# Patient Record
Sex: FEMALE | Race: BLACK OR AFRICAN AMERICAN | Employment: FULL TIME | URBAN - METROPOLITAN AREA
[De-identification: names, ages, dates, MRNs, and addresses within clinical notes are randomized per-mention and may not be internally consistent; named-entity substitution may affect disease eponyms.]

---

## 2020-02-24 ENCOUNTER — HOSPITAL ENCOUNTER (EMERGENCY)
Age: 35
Discharge: HOME | End: 2020-02-24
Attending: EMERGENCY MEDICINE
Payer: COMMERCIAL

## 2020-02-24 ENCOUNTER — APPOINTMENT (OUTPATIENT)
Dept: ULTRASOUND IMAGING | Age: 35
End: 2020-02-24
Attending: EMERGENCY MEDICINE
Payer: COMMERCIAL

## 2020-02-24 VITALS
BODY MASS INDEX: 25.1 KG/M2 | OXYGEN SATURATION: 98 % | WEIGHT: 147 LBS | HEART RATE: 95 BPM | RESPIRATION RATE: 16 BRPM | DIASTOLIC BLOOD PRESSURE: 68 MMHG | SYSTOLIC BLOOD PRESSURE: 120 MMHG | TEMPERATURE: 98 F | HEIGHT: 64 IN

## 2020-02-24 DIAGNOSIS — O03.9 SPONTANEOUS MISCARRIAGE: Primary | ICD-10-CM

## 2020-02-24 LAB
ANION GAP SERPL CALC-SCNC: 11 MMOL/L (ref 7–16)
BUN SERPL-MCNC: 6 MG/DL (ref 6–23)
CALCIUM SERPL-MCNC: 8.8 MG/DL (ref 8.3–10.4)
CHLORIDE SERPL-SCNC: 103 MMOL/L (ref 98–107)
CO2 SERPL-SCNC: 19 MMOL/L (ref 21–32)
CREAT SERPL-MCNC: 0.5 MG/DL (ref 0.6–1)
ERYTHROCYTE [DISTWIDTH] IN BLOOD BY AUTOMATED COUNT: 17 % (ref 11.9–14.6)
GLUCOSE SERPL-MCNC: 132 MG/DL (ref 65–100)
HCG SERPL-ACNC: ABNORMAL MIU/ML (ref 0–6)
HCT VFR BLD AUTO: 35.9 % (ref 35.8–46.3)
HGB BLD-MCNC: 11.6 G/DL (ref 11.7–15.4)
MCH RBC QN AUTO: 27 PG (ref 26.1–32.9)
MCHC RBC AUTO-ENTMCNC: 32.3 G/DL (ref 31.4–35)
MCV RBC AUTO: 83.7 FL (ref 79.6–97.8)
NRBC # BLD: 0 K/UL (ref 0–0.2)
PLATELET # BLD AUTO: 314 K/UL (ref 150–450)
PMV BLD AUTO: 9.6 FL (ref 9.4–12.3)
POTASSIUM SERPL-SCNC: 3.4 MMOL/L (ref 3.5–5.1)
RBC # BLD AUTO: 4.29 M/UL (ref 4.05–5.2)
SODIUM SERPL-SCNC: 133 MMOL/L (ref 136–145)
WBC # BLD AUTO: 17.2 K/UL (ref 4.3–11.1)

## 2020-02-24 PROCEDURE — 88305 TISSUE EXAM BY PATHOLOGIST: CPT

## 2020-02-24 PROCEDURE — 84702 CHORIONIC GONADOTROPIN TEST: CPT

## 2020-02-24 PROCEDURE — 96372 THER/PROPH/DIAG INJ SC/IM: CPT

## 2020-02-24 PROCEDURE — 80048 BASIC METABOLIC PNL TOTAL CA: CPT

## 2020-02-24 PROCEDURE — 74011250636 HC RX REV CODE- 250/636: Performed by: EMERGENCY MEDICINE

## 2020-02-24 PROCEDURE — 85027 COMPLETE CBC AUTOMATED: CPT

## 2020-02-24 PROCEDURE — 99285 EMERGENCY DEPT VISIT HI MDM: CPT

## 2020-02-24 PROCEDURE — 76856 US EXAM PELVIC COMPLETE: CPT

## 2020-02-24 RX ORDER — IBUPROFEN 600 MG/1
600 TABLET ORAL
Qty: 20 TAB | Refills: 0 | Status: SHIPPED | OUTPATIENT
Start: 2020-02-24 | End: 2020-08-18

## 2020-02-24 RX ORDER — SODIUM CHLORIDE 9 MG/ML
125 INJECTION, SOLUTION INTRAVENOUS CONTINUOUS
Status: DISCONTINUED | OUTPATIENT
Start: 2020-02-24 | End: 2020-02-24 | Stop reason: HOSPADM

## 2020-02-24 RX ADMIN — RHO(D) IMMUNE GLOBULIN (HUMAN) 0.3 MG: 1500 SOLUTION INTRAMUSCULAR at 05:35

## 2020-02-24 RX ADMIN — SODIUM CHLORIDE 125 ML/HR: 900 INJECTION, SOLUTION INTRAVENOUS at 05:35

## 2020-02-24 NOTE — ED TRIAGE NOTES
EMS was called out to pt house for miscarriage. The pt was 16 weeks gestation with baby number 1. Pt went to use the restroom and passed the baby.  VS were stable via ems 120/76 80     20G was placed in right hand via EMS

## 2020-02-24 NOTE — ED NOTES
Specimen labeled with temporary clinical collect label and walked to lab by this RN. Downtime form also sent with specimen.

## 2020-02-24 NOTE — DISCHARGE INSTRUCTIONS
Patient Education        Miscarriage: Care Instructions  Your Care Instructions    The loss of a pregnancy can be very hard. You may wonder why it happened or blame yourself. Miscarriages are common and are not caused by exercise, stress, or sex. Most happen because the fertilized egg in the uterus does not develop normally. There is no treatment that can stop a miscarriage. As long as you do not have heavy blood loss, fever, weakness, or other signs of infection, you can let a miscarriage follow its own course. This can take several days. Your body will recover over the next several weeks. Having a miscarriage does not mean you cannot have a normal pregnancy in the future. The doctor has checked you carefully, but problems can develop later. If you notice any problems or new symptoms, get medical treatment right away. Follow-up care is a key part of your treatment and safety. Be sure to make and go to all appointments, and call your doctor if you are having problems. It's also a good idea to know your test results and keep a list of the medicines you take. How can you care for yourself at home? · You will probably have some vaginal bleeding for 1 to 2 weeks. It may be similar to or slightly heavier than a normal period. The bleeding should get lighter after a week. Use pads instead of tampons. You may use tampons during your next period, which should start in 3 to 6 weeks. · Take an over-the-counter pain medicine, such as acetaminophen (Tylenol), ibuprofen (Advil, Motrin), or naproxen (Aleve) for cramps. Read and follow all instructions on the label. You may have cramps for several days after the miscarriage. · Do not take two or more pain medicines at the same time unless the doctor told you to. Many pain medicines have acetaminophen, which is Tylenol. Too much acetaminophen (Tylenol) can be harmful. · Use a clear container to save any tissue that you pass.  Take it to your doctor's office as soon as you can.  · Do not have sex until the bleeding stops. · You may return to your normal activities if you feel well enough to do so. But you should avoid heavy exercise until the bleeding stops. · If you would like to try to get pregnant again, it is usually safe whenever you feel ready. Talk with your doctor about any future pregnancy plans. · If you do not want to get pregnant, ask your doctor about birth control. You can get pregnant again before your next period starts if you are not using birth control. · You may be low in iron because of blood loss. Eat a balanced diet that is high in iron and vitamin C. Foods rich in iron include red meat, shellfish, eggs, beans, and leafy green vegetables. Foods high in vitamin C include citrus fruits, tomatoes, and broccoli. Talk to your doctor about whether you need to take iron pills or a multivitamin. · The loss of a pregnancy can be very hard. You may wonder why it happened and blame yourself. Talking to family members, friends, a counselor, or your doctor may help you cope with your loss. When should you call for help? Call 911 anytime you think you may need emergency care. For example, call if:    · You passed out (lost consciousness).    Call your doctor now or seek immediate medical care if:    · You have severe vaginal bleeding.     · You are dizzy or lightheaded, or you feel like you may faint.     · You have new or worse pain in your belly or pelvis.     · You have a fever.     · You have vaginal discharge that smells bad.    Watch closely for changes in your health, and be sure to contact your doctor if:    · You do not get better as expected. Where can you learn more? Go to http://daniel-harry.info/. Enter E802 in the search box to learn more about \"Miscarriage: Care Instructions. \"  Current as of: May 29, 2019  Content Version: 12.2  © 0989-4161 [employer].  Care instructions adapted under license by [employer] (which disclaims liability or warranty for this information). If you have questions about a medical condition or this instruction, always ask your healthcare professional. [address] any warranty or liability for your use of this information.

## 2020-02-24 NOTE — ED PROVIDER NOTES
51-year-old female presents with concerns about a spontaneous miscarriage. She said last night before she went to bed she had some very light spotting but then this morning became heavier and as she was trying to urinate the embryo delivered. She was approximately 16 weeks in G1. She denies any medical problems and does not take any medications. No other associated symptoms. Elements of this note were created using speech recognition software. As such, errors of speech recognition may be present. No past medical history on file. No past surgical history on file. No family history on file.     Social History     Socioeconomic History    Marital status: SINGLE     Spouse name: Not on file    Number of children: Not on file    Years of education: Not on file    Highest education level: Not on file   Occupational History    Not on file   Social Needs    Financial resource strain: Not on file    Food insecurity:     Worry: Not on file     Inability: Not on file    Transportation needs:     Medical: Not on file     Non-medical: Not on file   Tobacco Use    Smoking status: Not on file   Substance and Sexual Activity    Alcohol use: Not on file    Drug use: Not on file    Sexual activity: Not on file   Lifestyle    Physical activity:     Days per week: Not on file     Minutes per session: Not on file    Stress: Not on file   Relationships    Social connections:     Talks on phone: Not on file     Gets together: Not on file     Attends [employer] service: Not on file     Active member of club or organization: Not on file     Attends meetings of clubs or organizations: Not on file     Relationship status: Not on file    Intimate partner violence:     Fear of current or ex partner: Not on file     Emotionally abused: Not on file     Physically abused: Not on file     Forced sexual activity: Not on file   Other Topics Concern    Not on file   Social History Narrative    Not on file ALLERGIES: Patient has no known allergies. Review of Systems   Constitutional: Negative for chills, diaphoresis and fever. HENT: Negative for congestion, rhinorrhea and sore throat. Eyes: Negative for redness and visual disturbance. Respiratory: Negative for cough, chest tightness, shortness of breath and wheezing. Cardiovascular: Negative for chest pain and palpitations. Gastrointestinal: Negative for abdominal pain, blood in stool, diarrhea, nausea and vomiting. Endocrine: Negative for polydipsia and polyuria. Genitourinary: Positive for vaginal bleeding. Negative for dysuria and hematuria. Musculoskeletal: Negative for arthralgias, myalgias and neck stiffness. Skin: Negative for rash. Allergic/Immunologic: Negative for environmental allergies and food allergies. Neurological: Negative for dizziness, weakness and headaches. Hematological: Negative for adenopathy. Does not bruise/bleed easily. Psychiatric/Behavioral: Negative for confusion and sleep disturbance. The patient is not nervous/anxious. Vitals:    02/24/20 0520   BP: 117/71   Pulse: (!) 111   Resp: 18   Temp: 98 °F (36.7 °C)   SpO2: 96%   Weight: 66.7 kg (147 lb)   Height: 5' 4\" (1.626 m)            Physical Exam  Vitals signs and nursing note reviewed. Constitutional:       Appearance: Normal appearance. Cardiovascular:      Rate and Rhythm: Normal rate and regular rhythm. Pulses: Normal pulses. Heart sounds: Normal heart sounds. Pulmonary:      Effort: Pulmonary effort is normal.      Breath sounds: Normal breath sounds. Genitourinary:     Comments: Obvious embryo was already out. I clamped and cut the umbilical cord. Neurological:      Mental Status: She is alert. MDM  Number of Diagnoses or Management Options  Diagnosis management comments: Will discuss the case with on-call for [facility].     Patient is a negative so I will give her RhoGam    I spoke with Dr. Amaya Moura who is going to review the chart. 5:37 AM  The placenta appears to have delivered.          Procedures

## 2020-02-24 NOTE — ED NOTES
I have reviewed discharge instructions with the patient and boyfriend. The patient and boyfriend verbalized understanding. Patient left ED via Discharge Method: supplied with bating clothes, wash clothes and towels in room at this time, will be departing when completed with boyfriend. Opportunity for questions and clarification provided. Patient given 1 scripts. To continue your aftercare when you leave the hospital, you may receive an automated call from our care team to check in on how you are doing. This is a free service and part of our promise to provide the best care and service to meet your aftercare needs.  If you have questions, or wish to unsubscribe from this service please call [phone]. Thank you for Choosing our [facility] Emergency Department.

## 2020-02-24 NOTE — CONSULTS
History and Physical      Hurman Bottoms:   Physician:  Darlene Gonsalez. Alt, DO    This 37 y.o. female AA G1 presented to the ER with delivery of baby at home. Placenta was still in utero when ER called. Upon arrival to ER, placenta had spontaneously delivered. She was scheduled to see our office on 2/27. She has been receiving care with another group and was a transfer in. She had some ab pain several days ago. Noticed pink spotting last night and then felt pressure and went to the bathroom this am.          OB History    No obstetric history on file. G1    Past gyn hx:  Regular cycles, no infertility. Small fibroids per her report. No hx of cervical procedures. History reviewed. No pertinent past medical history. has no past surgical history on file. Current Facility-Administered Medications   Medication Dose Route Frequency    0.9% sodium chloride infusion  125 mL/hr IntraVENous CONTINUOUS     No current outpatient medications on file. No Known Allergies. reports that she has never smoked. She does not have any smokeless tobacco history on file. She reports that she does not drink alcohol or use drugs. family history is not on file. Physical Exam:    Vitals:    02/24/20 0520   BP: 117/71   Pulse: (!) 111   Resp: 18   Temp: 98 °F (36.7 °C)   SpO2: 96%   Weight: 147 lb (66.7 kg)   Height: 5' 4\" (1.626 m)       Patient without distress. Heart: Regular rate and rhythm   Lung: clear to auscultation throughout lung fields, no wheezes, no rales, no rhonchi and normal respiratory effort  Abdomen: soft, nontender. Firm fundus felt below umbilicus. Lower Extremities:  - Edema No  Fetus inspected and likely male with no defects seen. Placenta seen but seemed to be small. Findings/Diagnosis:     S/p delivery of 16 week fetus. Will order us to make sure products of conception  / placenta has passed. Per ER doctor, rh negative and rhogam was given. Called by [email or web address] tech. Lining looks free of placenta. I had seen pt and advised her to be out of work 3-5 days. Follow up 2 week in my office. Infection and bleeding precautions. rtc 2 weeks.

## 2020-02-24 NOTE — ED NOTES
Pt presents to the ED for spontaneous vaginal delivery at approx 16 weeks. Pt states that she has not had any prenatal complication but had some vaginal spotting earlier tonight and then noticed increased vaginal bleeding with spontaneous delivery. Denies pain at the present time.   Boyfriend at the bedside

## 2020-02-24 NOTE — PROGRESS NOTES
Support given to patient and boyfriend in 14 week fetal death of baby boy. \"[employer]\"  Support material given as well as Viv[facility]te support group information given along with contact information. Patient decided on [facility].  I went over information with them as to burial location, etc.  Patient and boyfriend tearful and processing appropriately this tragic event. Continued support given. Haydee Man M.Div.

## 2020-03-10 ENCOUNTER — TELEPHONE (OUTPATIENT)
Dept: CASE MANAGEMENT | Age: 35
End: 2020-03-10

## 2020-03-10 PROBLEM — Z11.3 SCREENING EXAMINATION FOR VENEREAL DISEASE: Status: ACTIVE | Noted: 2020-03-10

## 2020-03-10 NOTE — TELEPHONE ENCOUNTER
SW consult received from [facility] office due to 16 week loss last month. Phone call to patient at [phone]. Patient states that she's \"doing okay today. \"  Per patient, \"I can't bring myself to look at the papers they gave me\" (while in the Emergency Room). Emotional support provided by .  provided education on resources/support available thru [address] [place] [address]. Additionally, information was e-mailed to patient at: [email or web address]. Patient denied any additional needs at this time. Patient has this 's contact information should any needs/questions arise.     PATRICIO Hodge  [facility]   [phone]

## 2020-04-09 PROBLEM — Z11.3 SCREENING EXAMINATION FOR VENEREAL DISEASE: Status: RESOLVED | Noted: 2020-03-10 | Resolved: 2020-04-09

## 2020-07-20 PROBLEM — D25.9 UTERINE FIBROIDS AFFECTING PREGNANCY: Status: ACTIVE | Noted: 2020-07-20

## 2020-07-20 PROBLEM — Z87.51 HISTORY OF PRETERM DELIVERY: Status: ACTIVE | Noted: 2020-07-20

## 2020-07-20 PROBLEM — O26.899 RH NEGATIVE STATUS DURING PREGNANCY: Status: ACTIVE | Noted: 2020-07-20

## 2020-07-20 PROBLEM — O34.10 UTERINE FIBROIDS AFFECTING PREGNANCY: Status: ACTIVE | Noted: 2020-07-20

## 2020-07-20 PROBLEM — Z34.90 PREGNANCY: Status: ACTIVE | Noted: 2020-07-20

## 2020-07-20 PROBLEM — Z67.91 RH NEGATIVE STATUS DURING PREGNANCY: Status: ACTIVE | Noted: 2020-07-20

## 2020-08-17 PROBLEM — O09.91 HIGH-RISK PREGNANCY IN FIRST TRIMESTER: Status: ACTIVE | Noted: 2020-07-20

## 2020-08-17 PROBLEM — O09.299: Status: ACTIVE | Noted: 2020-07-20

## 2020-08-18 PROBLEM — Z36.82 NUCHAL TRANSLUCENCY OF FETUS ON PRENATAL ULTRASOUND: Status: ACTIVE | Noted: 2020-08-18

## 2020-09-08 PROBLEM — O09.92 HIGH-RISK PREGNANCY IN SECOND TRIMESTER: Status: ACTIVE | Noted: 2020-07-20

## 2020-09-08 PROBLEM — Z36.82 NUCHAL TRANSLUCENCY OF FETUS ON PRENATAL ULTRASOUND: Status: RESOLVED | Noted: 2020-08-18 | Resolved: 2020-09-08

## 2020-09-17 PROBLEM — O34.12 LEIOMYOMA OF UTERUS AFFECTING PREGNANCY IN SECOND TRIMESTER: Status: ACTIVE | Noted: 2020-07-20

## 2020-09-17 PROBLEM — O36.5990 PREGNANCY AFFECTED BY FETAL GROWTH RESTRICTION: Status: ACTIVE | Noted: 2020-09-17

## 2020-10-19 PROBLEM — O16.9 ELEVATED BLOOD PRESSURE AFFECTING PREGNANCY, ANTEPARTUM: Status: ACTIVE | Noted: 2020-10-19

## 2020-11-16 PROBLEM — O36.5120 PLACENTAL INSUFFICIENCY AFFECTING MANAGEMENT OF MOTHER IN SECOND TRIMESTER: Status: ACTIVE | Noted: 2020-11-16

## 2020-11-23 PROBLEM — O11.2: Status: ACTIVE | Noted: 2020-10-19

## 2020-12-04 PROBLEM — O11.3: Status: ACTIVE | Noted: 2020-10-19

## 2020-12-04 PROBLEM — O09.93 HIGH-RISK PREGNANCY IN THIRD TRIMESTER: Status: ACTIVE | Noted: 2020-07-20

## 2020-12-07 PROBLEM — O10.913 CHRONIC HYPERTENSION WITH EXACERBATION DURING PREGNANCY IN THIRD TRIMESTER: Status: ACTIVE | Noted: 2020-10-19

## 2020-12-07 PROBLEM — O34.13 LEIOMYOMA OF UTERUS AFFECTING PREGNANCY IN THIRD TRIMESTER: Status: ACTIVE | Noted: 2020-07-20

## 2020-12-07 PROBLEM — O10.912 CHRONIC HYPERTENSION WITH EXACERBATION DURING PREGNANCY IN SECOND TRIMESTER: Status: ACTIVE | Noted: 2020-10-19

## 2020-12-07 PROBLEM — O36.5130 PLACENTAL INSUFFICIENCY AFFECTING MANAGEMENT OF MOTHER IN THIRD TRIMESTER: Status: ACTIVE | Noted: 2020-11-16

## 2020-12-09 PROBLEM — R82.71 GBS BACTERIURIA: Status: ACTIVE | Noted: 2020-01-14

## 2020-12-24 ENCOUNTER — HOSPITAL ENCOUNTER (EMERGENCY)
Age: 35
Discharge: HOME | End: 2020-12-24
Attending: OBSTETRICS & GYNECOLOGY | Admitting: OBSTETRICS & GYNECOLOGY
Payer: COMMERCIAL

## 2020-12-24 VITALS — HEART RATE: 109 BPM | DIASTOLIC BLOOD PRESSURE: 85 MMHG | SYSTOLIC BLOOD PRESSURE: 137 MMHG

## 2020-12-24 LAB
ALBUMIN SERPL-MCNC: 2.4 G/DL (ref 3.5–5)
ALBUMIN/GLOB SERPL: 0.6 {RATIO} (ref 1.2–3.5)
ALP SERPL-CCNC: 81 U/L (ref 50–136)
ALT SERPL-CCNC: 14 U/L (ref 12–65)
ANION GAP SERPL CALC-SCNC: 10 MMOL/L (ref 7–16)
AST SERPL-CCNC: 13 U/L (ref 15–37)
BASOPHILS # BLD: 0 K/UL (ref 0–0.2)
BASOPHILS NFR BLD: 0 % (ref 0–2)
BILIRUB SERPL-MCNC: 0.2 MG/DL (ref 0.2–1.1)
BUN SERPL-MCNC: 6 MG/DL (ref 6–23)
CALCIUM SERPL-MCNC: 8.3 MG/DL (ref 8.3–10.4)
CHLORIDE SERPL-SCNC: 104 MMOL/L (ref 98–107)
CO2 SERPL-SCNC: 22 MMOL/L (ref 21–32)
CREAT SERPL-MCNC: 0.57 MG/DL (ref 0.6–1)
DIFFERENTIAL METHOD BLD: ABNORMAL
EOSINOPHIL # BLD: 0.1 K/UL (ref 0–0.8)
EOSINOPHIL NFR BLD: 2 % (ref 0.5–7.8)
ERYTHROCYTE [DISTWIDTH] IN BLOOD BY AUTOMATED COUNT: 16 % (ref 11.9–14.6)
GLOBULIN SER CALC-MCNC: 4.1 G/DL (ref 2.3–3.5)
GLUCOSE SERPL-MCNC: 177 MG/DL (ref 65–100)
HCT VFR BLD AUTO: 33.6 % (ref 35.8–46.3)
HGB BLD-MCNC: 10.8 G/DL (ref 11.7–15.4)
IMM GRANULOCYTES # BLD AUTO: 0 K/UL (ref 0–0.5)
IMM GRANULOCYTES NFR BLD AUTO: 0 % (ref 0–5)
LDH SERPL L TO P-CCNC: 152 U/L (ref 100–190)
LYMPHOCYTES # BLD: 2 K/UL (ref 0.5–4.6)
LYMPHOCYTES NFR BLD: 27 % (ref 13–44)
MCH RBC QN AUTO: 26.6 PG (ref 26.1–32.9)
MCHC RBC AUTO-ENTMCNC: 32.1 G/DL (ref 31.4–35)
MCV RBC AUTO: 82.8 FL (ref 79.6–97.8)
MONOCYTES # BLD: 0.7 K/UL (ref 0.1–1.3)
MONOCYTES NFR BLD: 9 % (ref 4–12)
NEUTS SEG # BLD: 4.6 K/UL (ref 1.7–8.2)
NEUTS SEG NFR BLD: 61 % (ref 43–78)
NRBC # BLD: 0 K/UL (ref 0–0.2)
PLATELET # BLD AUTO: 341 K/UL (ref 150–450)
PMV BLD AUTO: 10.3 FL (ref 9.4–12.3)
POTASSIUM SERPL-SCNC: 3.1 MMOL/L (ref 3.5–5.1)
PROT SERPL-MCNC: 6.5 G/DL (ref 6.3–8.2)
RBC # BLD AUTO: 4.06 M/UL (ref 4.05–5.2)
SODIUM SERPL-SCNC: 136 MMOL/L (ref 136–145)
URATE SERPL-MCNC: 3 MG/DL (ref 2.6–6)
WBC # BLD AUTO: 7.5 K/UL (ref 4.3–11.1)

## 2020-12-24 PROCEDURE — 84550 ASSAY OF BLOOD/URIC ACID: CPT

## 2020-12-24 PROCEDURE — 80053 COMPREHEN METABOLIC PANEL: CPT

## 2020-12-24 PROCEDURE — 83615 LACTATE (LD) (LDH) ENZYME: CPT

## 2020-12-24 PROCEDURE — 85025 COMPLETE CBC W/AUTO DIFF WBC: CPT

## 2020-12-24 PROCEDURE — 59025 FETAL NON-STRESS TEST: CPT

## 2020-12-24 NOTE — DISCHARGE INSTRUCTIONS
Patient Education        Preeclampsia: Care Instructions  Overview     Preeclampsia occurs when a woman's blood pressure rises during pregnancy. Often with preeclampsia, you also have swelling in your legs, hands, and face. A test may show too much protein in your urine. Preeclampsia is also called toxemia. If preeclampsia is severe and not treated, it can lead to seizures (eclampsia) and damage to your liver or kidneys. Preeclampsia can prevent your baby from getting enough food and oxygen. This can cause a low birth weight or other problems. Your doctor will watch you closely to prevent these problems. He or she also may recommend that you reduce your activity. If your preeclampsia is a danger to your health or the health of your baby, your doctor may need to deliver your baby early. While preeclampsia is a concern, most women with preeclampsia have healthy babies. After a woman gives birth, preeclampsia usually goes away on its own. But symptoms may last a few weeks or more and can get worse after delivery. Rarely, symptoms of preeclampsia don't show up until days or even weeks after childbirth. Follow-up care is a key part of your treatment and safety. Be sure to make and go to all appointments, and call your doctor if you are having problems. It's also a good idea to know your test results and keep a list of the medicines you take. How can you care for yourself at home? · Take and record your blood pressure at home if your doctor tells you to. ? Learn the importance of the two measures of blood pressure (such as 120 over 80, or 120/80). The first number is the systolic pressure, which is the force of blood on the artery walls as the heart pumps. The second number is the diastolic pressure, which is the force of blood on the artery walls between heartbeats, when the heart is at rest. You have a choice of monitors to use. ? Manual monitor:  You pump up the cuff and use a stethoscope to listen for your pulse.  ? Electronic monitor: The cuff inflates, and a gauge shows your pulse rate. ? To take your blood pressure:  ? Ask your doctor to check your blood pressure monitor to be sure that it is accurate and that the cuff fits you. Also ask your doctor to watch you to make sure that you are using it right. ? You should not eat, use tobacco products, or use medicine known to raise blood pressure (such as some nasal decongestant sprays) before you take your blood pressure. ? Avoid taking your blood pressure if you have just exercised. Also avoid taking it if you are nervous or upset. Rest at least 15 minutes before you take your blood pressure. · If your doctor advises, check the protein levels in your urine. Your doctor or nurse will show you how to do this. · Take your medicines exactly as prescribed. Call your doctor if you think you are having a problem with your medicine. · Do not smoke. Quitting smoking will help improve your baby's growth and health. If you need help quitting, talk to your doctor about stop-smoking programs and medicines. These can increase your chances of quitting for good. · Eat a balanced and healthy diet that has lots of fruits and vegetables. · If your doctor advised bed rest, be sure to stay off your feet and rest as much as possible. ? Keep a phone, notepad, and pen near the bed where you can easily reach them. ? Gently stretch your legs every hour to maintain good blood flow. ? Have another family member pack snacks and lunch food in a cooler close to your bed. ? Use this time for activities that you usually cannot find time for, such as reading, craft projects, or letter writing. · You can keep track of your baby's health by noting the length of time it takes to count 10 movements (such as kicks, flutters, or rolls). Feeling 10 movements in less than 1 hour is considered normal. Track your baby's movements once each day.  Bring this record with you to each prenatal visit.  When should you call for help? Call 911 anytime you think you may need emergency care. For example, call if:    · You passed out (lost consciousness).     · You have a seizure. Call your doctor now or seek immediate medical care if:    · You have symptoms of preeclampsia, such as:  ? Sudden swelling of your face, hands, or feet. ? New vision problems (such as dimness, blurring, or seeing spots). ? A severe headache.     · Your blood pressure is higher than it should be, or it rises suddenly.     · You have new nausea or vomiting.     · You think that you are in labor.     · You have pain in your belly or pelvis. Watch closely for changes in your health, and be sure to contact your doctor if:    · You gain weight rapidly. Where can you learn more? Go to http://www.gray.com/  Enter Z954 in the search box to learn more about \"Preeclampsia: Care Instructions. \"  Current as of: February 11, 2020               Content Version: 12.6  © 2006-2020 [employer]. Care instructions adapted under license by [employer] (which disclaims liability or warranty for this information). If you have questions about a medical condition or this instruction, always ask your healthcare professional. [address] any warranty or liability for your use of this information.

## 2020-12-24 NOTE — PROGRESS NOTES
In reviewing s/sx of pre-eclampsia pt reports that she has intermittent right sided epigastric pain that lasts all day but then goes away. Dr. Frankie Tse updated. MD orders received to draw [facility].

## 2020-12-24 NOTE — PROGRESS NOTES
Dr. Tessy Crawford updated on lab results. Discharge orders received. Discharge instructions reviewed with pt.  Pt ambulated off of floor with significant other without difficulty

## 2020-12-28 PROBLEM — O99.013 ANEMIA DURING PREGNANCY IN THIRD TRIMESTER: Status: ACTIVE | Noted: 2020-12-28

## 2021-01-07 ENCOUNTER — HOSPITAL ENCOUNTER (INPATIENT)
Age: 36
LOS: 3 days | Discharge: ANOTHER HOSPITAL | End: 2021-01-10
Attending: OBSTETRICS & GYNECOLOGY | Admitting: OBSTETRICS & GYNECOLOGY
Payer: COMMERCIAL

## 2021-01-07 ENCOUNTER — APPOINTMENT (OUTPATIENT)
Dept: GENERAL RADIOLOGY | Age: 36
End: 2021-01-07
Attending: OBSTETRICS & GYNECOLOGY
Payer: COMMERCIAL

## 2021-01-07 ENCOUNTER — APPOINTMENT (OUTPATIENT)
Dept: ULTRASOUND IMAGING | Age: 36
End: 2021-01-07
Attending: OBSTETRICS & GYNECOLOGY
Payer: COMMERCIAL

## 2021-01-07 DIAGNOSIS — O98.513 COVID-19 AFFECTING PREGNANCY IN THIRD TRIMESTER: ICD-10-CM

## 2021-01-07 DIAGNOSIS — U07.1 COVID-19 AFFECTING PREGNANCY IN THIRD TRIMESTER: ICD-10-CM

## 2021-01-07 DIAGNOSIS — O60.03 PRETERM LABOR IN THIRD TRIMESTER WITHOUT DELIVERY: ICD-10-CM

## 2021-01-07 DIAGNOSIS — O11.9 PRE-ECLAMPSIA SUPERIMPOSED ON CHRONIC HYPERTENSION: ICD-10-CM

## 2021-01-07 DIAGNOSIS — O36.5990 PREGNANCY AFFECTED BY FETAL GROWTH RESTRICTION: ICD-10-CM

## 2021-01-07 DIAGNOSIS — O36.5130 PLACENTAL INSUFFICIENCY AFFECTING MANAGEMENT OF MOTHER IN THIRD TRIMESTER, SINGLE OR UNSPECIFIED FETUS: ICD-10-CM

## 2021-01-07 DIAGNOSIS — O09.93 HIGH-RISK PREGNANCY IN THIRD TRIMESTER: ICD-10-CM

## 2021-01-07 DIAGNOSIS — O10.913 CHRONIC HYPERTENSION WITH EXACERBATION DURING PREGNANCY IN THIRD TRIMESTER: ICD-10-CM

## 2021-01-07 PROBLEM — O14.13 PREECLAMPSIA, SEVERE, THIRD TRIMESTER: Status: ACTIVE | Noted: 2021-01-07

## 2021-01-07 LAB
A1 MICROGLOB PLACENTAL VAG QL: NEGATIVE
ALBUMIN SERPL-MCNC: 2.3 G/DL (ref 3.5–5)
ALBUMIN/GLOB SERPL: 0.5 {RATIO} (ref 1.2–3.5)
ALP SERPL-CCNC: 94 U/L (ref 50–130)
ALT SERPL-CCNC: 23 U/L (ref 12–65)
AMYLASE SERPL-CCNC: 81 U/L (ref 25–115)
ANION GAP SERPL CALC-SCNC: 10 MMOL/L (ref 7–16)
APPEARANCE UR: ABNORMAL
AST SERPL-CCNC: 32 U/L (ref 15–37)
BACTERIA SPEC CULT: ABNORMAL
BACTERIA SPEC CULT: ABNORMAL
BACTERIA URNS QL MICRO: ABNORMAL /HPF
BASOPHILS # BLD: 0 K/UL (ref 0–0.2)
BASOPHILS NFR BLD: 1 % (ref 0–2)
BILIRUB SERPL-MCNC: 0.4 MG/DL (ref 0.2–1.1)
BILIRUB UR QL: NEGATIVE
BUN SERPL-MCNC: 4 MG/DL (ref 6–23)
CALCIUM SERPL-MCNC: 8.6 MG/DL (ref 8.3–10.4)
CHLORIDE SERPL-SCNC: 104 MMOL/L (ref 98–107)
CO2 SERPL-SCNC: 20 MMOL/L (ref 21–32)
COLOR UR: YELLOW
CONTROL LINE PRESENT?: NORMAL
COVID-19 RAPID TEST, COVR: DETECTED
CREAT SERPL-MCNC: 0.57 MG/DL (ref 0.6–1)
CREAT UR-MCNC: 136 MG/DL
DIFFERENTIAL METHOD BLD: ABNORMAL
EOSINOPHIL # BLD: 0.1 K/UL (ref 0–0.8)
EOSINOPHIL NFR BLD: 1 % (ref 0.5–7.8)
EPI CELLS #/AREA URNS HPF: ABNORMAL /HPF
ERYTHROCYTE [DISTWIDTH] IN BLOOD BY AUTOMATED COUNT: 16.7 % (ref 11.9–14.6)
EXPIRATION DATE: NORMAL
FLUAV AG NPH QL IA: NEGATIVE
FLUBV AG NPH QL IA: NEGATIVE
GLOBULIN SER CALC-MCNC: 4.4 G/DL (ref 2.3–3.5)
GLUCOSE SERPL-MCNC: 97 MG/DL (ref 65–100)
GLUCOSE UR STRIP.AUTO-MCNC: NEGATIVE MG/DL
HCT VFR BLD AUTO: 34.2 % (ref 35.8–46.3)
HGB BLD-MCNC: 10.9 G/DL (ref 11.7–15.4)
HGB UR QL STRIP: ABNORMAL
IMM GRANULOCYTES # BLD AUTO: 0 K/UL (ref 0–0.5)
IMM GRANULOCYTES NFR BLD AUTO: 0 % (ref 0–5)
INTERNAL NEGATIVE CONTROL: NORMAL
KETONES UR QL STRIP.AUTO: >80 MG/DL
KIT LOT NO.: NORMAL
LACTATE SERPL-SCNC: 1.3 MMOL/L (ref 0.4–2)
LDH SERPL L TO P-CCNC: 241 U/L (ref 100–190)
LEUKOCYTE ESTERASE UR QL STRIP.AUTO: ABNORMAL
LIPASE SERPL-CCNC: 118 U/L (ref 73–393)
LYMPHOCYTES # BLD: 1.6 K/UL (ref 0.5–4.6)
LYMPHOCYTES NFR BLD: 22 % (ref 13–44)
MCH RBC QN AUTO: 26 PG (ref 26.1–32.9)
MCHC RBC AUTO-ENTMCNC: 31.9 G/DL (ref 31.4–35)
MCV RBC AUTO: 81.4 FL (ref 79.6–97.8)
MONOCYTES # BLD: 0.6 K/UL (ref 0.1–1.3)
MONOCYTES NFR BLD: 8 % (ref 4–12)
MUCOUS THREADS URNS QL MICRO: ABNORMAL /LPF
NEUTS SEG # BLD: 4.8 K/UL (ref 1.7–8.2)
NEUTS SEG NFR BLD: 68 % (ref 43–78)
NITRITE UR QL STRIP.AUTO: NEGATIVE
NRBC # BLD: 0.02 K/UL (ref 0–0.2)
OTHER OBSERVATIONS,UCOM: ABNORMAL
PH UR STRIP: 6.5 [PH] (ref 5–9)
PLATELET # BLD AUTO: 285 K/UL (ref 150–450)
PMV BLD AUTO: 10.6 FL (ref 9.4–12.3)
POTASSIUM SERPL-SCNC: 3.2 MMOL/L (ref 3.5–5.1)
PROT SERPL-MCNC: 6.7 G/DL (ref 6.3–8.2)
PROT UR STRIP-MCNC: 30 MG/DL
PROT UR-MCNC: 82 MG/DL
PROT/CREAT UR-RTO: 0.6
RBC # BLD AUTO: 4.2 M/UL (ref 4.05–5.2)
RBC #/AREA URNS HPF: ABNORMAL /HPF
SERVICE CMNT-IMP: ABNORMAL
SODIUM SERPL-SCNC: 134 MMOL/L (ref 136–145)
SOURCE, COVRS: ABNORMAL
SP GR UR REFRACTOMETRY: 1.02 (ref 1–1.02)
SPECIMEN SOURCE: NORMAL
URATE SERPL-MCNC: 4.4 MG/DL (ref 2.6–6)
UROBILINOGEN UR QL STRIP.AUTO: 0.2 EU/DL (ref 0.2–1)
WBC # BLD AUTO: 7.1 K/UL (ref 4.3–11.1)
WBC URNS QL MICRO: ABNORMAL /HPF
YEAST URNS QL MICRO: ABNORMAL

## 2021-01-07 PROCEDURE — 83615 LACTATE (LD) (LDH) ENZYME: CPT

## 2021-01-07 PROCEDURE — 87804 INFLUENZA ASSAY W/OPTIC: CPT

## 2021-01-07 PROCEDURE — 87653 STREP B DNA AMP PROBE: CPT

## 2021-01-07 PROCEDURE — 84550 ASSAY OF BLOOD/URIC ACID: CPT

## 2021-01-07 PROCEDURE — 83605 ASSAY OF LACTIC ACID: CPT

## 2021-01-07 PROCEDURE — 65270000029 HC RM PRIVATE

## 2021-01-07 PROCEDURE — 93971 EXTREMITY STUDY: CPT

## 2021-01-07 PROCEDURE — 87635 SARS-COV-2 COVID-19 AMP PRB: CPT

## 2021-01-07 PROCEDURE — 99356 PR PROLONGED SVC I/P OR OBS SETTING 1ST HOUR: CPT | Performed by: OBSTETRICS & GYNECOLOGY

## 2021-01-07 PROCEDURE — 74011250636 HC RX REV CODE- 250/636: Performed by: OBSTETRICS & GYNECOLOGY

## 2021-01-07 PROCEDURE — 82150 ASSAY OF AMYLASE: CPT

## 2021-01-07 PROCEDURE — 99285 EMERGENCY DEPT VISIT HI MDM: CPT | Performed by: OBSTETRICS & GYNECOLOGY

## 2021-01-07 PROCEDURE — 86901 BLOOD TYPING SEROLOGIC RH(D): CPT

## 2021-01-07 PROCEDURE — 99255 IP/OBS CONSLTJ NEW/EST HI 80: CPT | Performed by: OBSTETRICS & GYNECOLOGY

## 2021-01-07 PROCEDURE — 80053 COMPREHEN METABOLIC PANEL: CPT

## 2021-01-07 PROCEDURE — 85025 COMPLETE CBC W/AUTO DIFF WBC: CPT

## 2021-01-07 PROCEDURE — 74011250637 HC RX REV CODE- 250/637: Performed by: OBSTETRICS & GYNECOLOGY

## 2021-01-07 PROCEDURE — 87086 URINE CULTURE/COLONY COUNT: CPT

## 2021-01-07 PROCEDURE — 86769 SARS-COV-2 COVID-19 ANTIBODY: CPT

## 2021-01-07 PROCEDURE — 86923 COMPATIBILITY TEST ELECTRIC: CPT

## 2021-01-07 PROCEDURE — 36415 COLL VENOUS BLD VENIPUNCTURE: CPT

## 2021-01-07 PROCEDURE — 82570 ASSAY OF URINE CREATININE: CPT

## 2021-01-07 PROCEDURE — 84156 ASSAY OF PROTEIN URINE: CPT

## 2021-01-07 PROCEDURE — 81001 URINALYSIS AUTO W/SCOPE: CPT

## 2021-01-07 PROCEDURE — 74011250637 HC RX REV CODE- 250/637

## 2021-01-07 PROCEDURE — 87081 CULTURE SCREEN ONLY: CPT

## 2021-01-07 PROCEDURE — 83690 ASSAY OF LIPASE: CPT

## 2021-01-07 PROCEDURE — 74011000258 HC RX REV CODE- 258: Performed by: OBSTETRICS & GYNECOLOGY

## 2021-01-07 PROCEDURE — 71045 X-RAY EXAM CHEST 1 VIEW: CPT

## 2021-01-07 PROCEDURE — 84112 EVAL AMNIOTIC FLUID PROTEIN: CPT | Performed by: OBSTETRICS & GYNECOLOGY

## 2021-01-07 RX ORDER — POTASSIUM CHLORIDE 20 MEQ/1
40 TABLET, EXTENDED RELEASE ORAL ONCE
Status: COMPLETED | OUTPATIENT
Start: 2021-01-07 | End: 2021-01-07

## 2021-01-07 RX ORDER — MAGNESIUM SULFATE HEPTAHYDRATE 40 MG/ML
2 INJECTION, SOLUTION INTRAVENOUS CONTINUOUS
Status: DISCONTINUED | OUTPATIENT
Start: 2021-01-07 | End: 2021-01-09

## 2021-01-07 RX ORDER — NIFEDIPINE 10 MG/1
10 CAPSULE ORAL
Status: COMPLETED | OUTPATIENT
Start: 2021-01-07 | End: 2021-01-07

## 2021-01-07 RX ORDER — NIFEDIPINE 10 MG/1
CAPSULE ORAL
Status: COMPLETED
Start: 2021-01-07 | End: 2021-01-07

## 2021-01-07 RX ORDER — SODIUM CHLORIDE 0.9 % (FLUSH) 0.9 %
5-40 SYRINGE (ML) INJECTION EVERY 8 HOURS
Status: DISCONTINUED | OUTPATIENT
Start: 2021-01-07 | End: 2021-01-09

## 2021-01-07 RX ORDER — MAGNESIUM SULFATE HEPTAHYDRATE 40 MG/ML
4 INJECTION, SOLUTION INTRAVENOUS ONCE
Status: COMPLETED | OUTPATIENT
Start: 2021-01-07 | End: 2021-01-07

## 2021-01-07 RX ORDER — POTASSIUM CHLORIDE 20 MEQ/1
20 TABLET, EXTENDED RELEASE ORAL
Status: COMPLETED | OUTPATIENT
Start: 2021-01-07 | End: 2021-01-07

## 2021-01-07 RX ORDER — SODIUM CHLORIDE 9 MG/ML
125 INJECTION, SOLUTION INTRAVENOUS CONTINUOUS
Status: DISCONTINUED | OUTPATIENT
Start: 2021-01-07 | End: 2021-01-09

## 2021-01-07 RX ORDER — ACETAMINOPHEN 325 MG/1
TABLET ORAL
Status: CANCELLED | OUTPATIENT
Start: 2021-01-07

## 2021-01-07 RX ORDER — MAGNESIUM SULFATE HEPTAHYDRATE 40 MG/ML
2 INJECTION, SOLUTION INTRAVENOUS ONCE
Status: COMPLETED | OUTPATIENT
Start: 2021-01-07 | End: 2021-01-07

## 2021-01-07 RX ORDER — ACETAMINOPHEN 325 MG/1
650 TABLET ORAL
Status: DISCONTINUED | OUTPATIENT
Start: 2021-01-07 | End: 2021-01-09

## 2021-01-07 RX ORDER — ONDANSETRON 2 MG/ML
4 INJECTION INTRAMUSCULAR; INTRAVENOUS
Status: DISCONTINUED | OUTPATIENT
Start: 2021-01-07 | End: 2021-01-09

## 2021-01-07 RX ORDER — SODIUM CHLORIDE 0.9 % (FLUSH) 0.9 %
5-40 SYRINGE (ML) INJECTION AS NEEDED
Status: DISCONTINUED | OUTPATIENT
Start: 2021-01-07 | End: 2021-01-09

## 2021-01-07 RX ORDER — BETAMETHASONE SODIUM PHOSPHATE AND BETAMETHASONE ACETATE 3; 3 MG/ML; MG/ML
12 INJECTION, SUSPENSION INTRA-ARTICULAR; INTRALESIONAL; INTRAMUSCULAR; SOFT TISSUE EVERY 24 HOURS
Status: COMPLETED | OUTPATIENT
Start: 2021-01-07 | End: 2021-01-08

## 2021-01-07 RX ORDER — NIFEDIPINE 30 MG/1
30 TABLET, EXTENDED RELEASE ORAL 2 TIMES DAILY
Status: DISCONTINUED | OUTPATIENT
Start: 2021-01-07 | End: 2021-01-09

## 2021-01-07 RX ORDER — SODIUM CHLORIDE, SODIUM LACTATE, POTASSIUM CHLORIDE, CALCIUM CHLORIDE 600; 310; 30; 20 MG/100ML; MG/100ML; MG/100ML; MG/100ML
125 INJECTION, SOLUTION INTRAVENOUS CONTINUOUS
Status: DISCONTINUED | OUTPATIENT
Start: 2021-01-07 | End: 2021-01-09

## 2021-01-07 RX ADMIN — SODIUM CHLORIDE 75 ML/HR: 900 INJECTION, SOLUTION INTRAVENOUS at 06:21

## 2021-01-07 RX ADMIN — SODIUM CHLORIDE 5 MILLION UNITS: 900 INJECTION INTRAVENOUS at 05:30

## 2021-01-07 RX ADMIN — POTASSIUM CHLORIDE 20 MEQ: 1500 TABLET, EXTENDED RELEASE ORAL at 14:45

## 2021-01-07 RX ADMIN — SODIUM CHLORIDE 2.5 MILLION UNITS: 9 INJECTION, SOLUTION INTRAVENOUS at 09:20

## 2021-01-07 RX ADMIN — BETAMETHASONE SODIUM PHOSPHATE AND BETAMETHASONE ACETATE 12 MG: 3; 3 INJECTION, SUSPENSION INTRA-ARTICULAR; INTRALESIONAL; INTRAMUSCULAR; SOFT TISSUE at 04:04

## 2021-01-07 RX ADMIN — NIFEDIPINE 30 MG: 30 TABLET, FILM COATED, EXTENDED RELEASE ORAL at 09:20

## 2021-01-07 RX ADMIN — SODIUM CHLORIDE, SODIUM LACTATE, POTASSIUM CHLORIDE, AND CALCIUM CHLORIDE 125 ML/HR: 600; 310; 30; 20 INJECTION, SOLUTION INTRAVENOUS at 04:06

## 2021-01-07 RX ADMIN — SODIUM CHLORIDE 2.5 MILLION UNITS: 9 INJECTION, SOLUTION INTRAVENOUS at 19:53

## 2021-01-07 RX ADMIN — SODIUM CHLORIDE 2.5 MILLION UNITS: 9 INJECTION, SOLUTION INTRAVENOUS at 13:18

## 2021-01-07 RX ADMIN — MAGNESIUM SULFATE HEPTAHYDRATE 2 G: 40 INJECTION, SOLUTION INTRAVENOUS at 03:59

## 2021-01-07 RX ADMIN — NIFEDIPINE 30 MG: 30 TABLET, FILM COATED, EXTENDED RELEASE ORAL at 19:53

## 2021-01-07 RX ADMIN — ACETAMINOPHEN 650 MG: 325 TABLET, FILM COATED ORAL at 04:51

## 2021-01-07 RX ADMIN — MAGNESIUM SULFATE HEPTAHYDRATE 2 G/HR: 40 INJECTION, SOLUTION INTRAVENOUS at 15:07

## 2021-01-07 RX ADMIN — SODIUM CHLORIDE 75 ML/HR: 900 INJECTION, SOLUTION INTRAVENOUS at 19:54

## 2021-01-07 RX ADMIN — SODIUM CHLORIDE 2.5 MILLION UNITS: 9 INJECTION, SOLUTION INTRAVENOUS at 17:23

## 2021-01-07 RX ADMIN — NIFEDIPINE 10 MG: 10 CAPSULE ORAL at 03:34

## 2021-01-07 RX ADMIN — POTASSIUM CHLORIDE 40 MEQ: 1500 TABLET, EXTENDED RELEASE ORAL at 05:53

## 2021-01-07 RX ADMIN — SODIUM CHLORIDE, SODIUM LACTATE, POTASSIUM CHLORIDE, AND CALCIUM CHLORIDE 1000 ML: 600; 310; 30; 20 INJECTION, SOLUTION INTRAVENOUS at 05:55

## 2021-01-07 RX ADMIN — MAGNESIUM SULFATE HEPTAHYDRATE 4 G: 40 INJECTION, SOLUTION INTRAVENOUS at 03:59

## 2021-01-07 RX ADMIN — MAGNESIUM SULFATE HEPTAHYDRATE 2 G/HR: 40 INJECTION, SOLUTION INTRAVENOUS at 05:14

## 2021-01-07 NOTE — CONSULTS
Neonatology Prenatal Consult:    Prenatal Consult was requested by the obstetrician. Obstetrical Note:  Medical record and notes reviewed. Obstetrical Findings:      Mother's Date of Admission: 2021  2:38 AM   Age: 37 y.o.  KEVIN: Estimated Date of Delivery: 21  Gestation by dates: 32w6d   Pregnancy:   Membrane status:        Social History     Socioeconomic History    Marital status: SINGLE     Spouse name: Not on file    Number of children: Not on file    Years of education: Not on file    Highest education level: Not on file   Tobacco Use    Smoking status: Never Smoker    Smokeless tobacco: Never Used   Substance and Sexual Activity    Alcohol use: Never     Frequency: Never    Drug use: Never    Sexual activity: Yes     Partners: Male     Birth control/protection: None   Other Topics Concern     Current Facility-Administered Medications   Medication Dose Route Frequency    sodium chloride (NS) flush 5-40 mL  5-40 mL IntraVENous Q8H    sodium chloride (NS) flush 5-40 mL  5-40 mL IntraVENous PRN    lactated Ringers infusion  125 mL/hr IntraVENous CONTINUOUS    penicillin G pot (PFIZERPEN) 2.5 Million Units in 50 ml 0.9% NaCl  2.5 Million Units IntraVENous Q4H    acetaminophen (TYLENOL) tablet 650 mg  650 mg Oral Q4H PRN    NIFEdipine ER (PROCARDIA XL) tablet 30 mg  30 mg Oral BID    betamethasone (CELESTONE) injection 12 mg  12 mg IntraMUSCular Q24H    ondansetron (ZOFRAN) injection 4 mg  4 mg IntraVENous Q4H PRN    magnesium sulfate 20 gm/500 mL Sterile Water infusion  2 g/hr IntraVENous CONTINUOUS    0.9% sodium chloride infusion  125 mL/hr IntraVENous CONTINUOUS     Patient Active Problem List    Diagnosis Date Noted    Pre-eclampsia superimposed on chronic hypertension 2021    COVID-19 affecting pregnancy in third trimester 2021    Anemia during pregnancy in third trimester 2020    Placental insufficiency affecting management of mother in third trimester 2020    Chronic hypertension with exacerbation during pregnancy in third trimester 10/19/2020    Pregnancy affected by fetal growth restriction 2020    Leiomyoma of uterus affecting pregnancy in third trimester 2020    High-risk pregnancy in third trimester 2020    Prior  loss, antepartum 2020    Rh negative status during pregnancy 2020    GBS bacteriuria 2020       Lab Results   Component Value Date/Time    ABO/Rh(D) A NEGATIVE 2021 03:44 AM    Antibody screen NEG 2021 03:44 AM    Antibody screen, External negative 2020    HBsAg, External negative 2020    HIV, External NR 2020    Rubella, External immune 2020    RPR, External NR 2020    ABO,Rh A negative 2020        Items below were discussed with the parents:      Neonatology coverage reviewed. Survival is very good to excellent. This improves with advancing gestational age. Expected LOS, dependent on gestational age and maturity skills reviewed.  resuscitation team attendance reviewed. Admissions procedures were explained. Family visitation policy were explained. RDS, at some risk. This risk decreases with advancing gestational age. Management of RDS was reviewed. Sepsis evaluation was explained. Feeding techniques reviewed. Mother plans to provide breast milk, yes. The benefits of breast milk were discussed in detail, and breast milk feedings is strongly recommended. Lactation services were also reviewed. Gut problems, NEC and infections are lessened with breast milk feedings. Significant IVH and ROP are at a low risk as compared to the extreme  gestation. This risk decreases with advancing gestational age. School readiness and learning problems are at a low but increased risk as compared to the full term gestation.    Asthma-like problems later in life is at a low but increased risk as compared to term gestation. An increased risk is found with a family history or with smoking. Significant morbidity or complications are a low occurrence for a \"late \" , but with the potential for an extended hospital stay because of temperature maintenance and poor feeding skills. If male , have discussed potential for circumcision. Risks and benefits explained. Family advised to think carefully about this procedure before consenting. SIDS and safe infant sleep practices were reviewed. Family advised to maintain their flu and pertussis vaccinations. Local or primary pediatrician: to be determined. Recommendations: The state  regionalization guidelines were reviewed. The timing and place of delivery is determined by the obstetrical staff. The subsequent appropriate level of  care is determined by the  staff. It is appropriate for the infant to be receive care here in our  Care Unit, if after evaluation of the  infant, it is determined that greater than 32 weeks gestation and greater than 1500 gm, or that a higher level of care is not required, or with consultation with the level III  C. admit to the neonatology service. Attestation:     Total consultation time was 40 minutes with over 50% of the total time was spent in counseling or coordination of care. This included prognosis, risks and benefits of management (treatment) options, importance of compliance with chosen management (treatment) options, and patient and family education.         Signed: Shi Frausto MD  Today's Date: 2021

## 2021-01-07 NOTE — CONSULTS
Maternal Fetal Medicine Inpatient Consult Note      Requesting SARA Packer    Chief Complaint:  Pregnancy and right leg pain and fevers with abdominal pain at home. History of Present Illness: 37 y.o.  at 31w6d gestation who presents with multiple concerns. Pt presented overnight 21 for evaluation due to Right Leg pain and Abdominal Pain/RUQ pain. On arrival found to have severe BP and fever. With questioning, has had fevers and occasional cough for several days. Pt was without complaints when seen in [facility] office on 21 am.     BP severe range on admission x 1 and promptly began procardia protocol. She is currently on scheduled procardia XL and Mag Sulfate. Today with ctx on toco that are not painful although noticeable. Of note- after seen by MD, pt complains of gush of fluid- clear on thighs with white flecks     RLE Duplex negative for DVT. CXR nonspecific but without obvious pneumonia. Patient denies HA, abdominal pain, vision changes. No regular painful contractions, LOF at time of consultation, VB. Good FM. Minimal edema. No chest pain or shortness of breath. No significant reflux, nausea, constipation, or other GI complaints. Review of Systems:  A comprehensive review of systems was negative except for that written in the HPI. History:  OB History    Para Term  AB Living   2 1 0 1 0 0   SAB TAB Ectopic Molar Multiple Live Births   0 0 0 0 0 1      # Outcome Date GA Lbr Edward/2nd Weight Sex Delivery Anes PTL Lv   2 Current            1  20 16w0d  3.7 oz (0.105 kg) M Vag-Spont  N ND      Birth Comments: delivered at home      Complications: Uterine fibroid     No past surgical history on file.     Past Medical History:   Diagnosis Date    Anemia     Chlamydia     Disease of blood and blood forming organ     Essential hypertension     Postpartum depression     Rh negative state in antepartum period     Uterine fibroid        Family History   Problem Relation Age of Onset    Schizophrenia Brother        Social History     Socioeconomic History    Marital status: SINGLE     Spouse name: Not on file    Number of children: Not on file    Years of education: Not on file    Highest education level: Not on file   Occupational History    Not on file   Social Needs    Financial resource strain: Not on file    Food insecurity     Worry: Not on file     Inability: Not on file    Transportation needs     Medical: Not on file     Non-medical: Not on file   Tobacco Use    Smoking status: Never Smoker    Smokeless tobacco: Never Used   Substance and Sexual Activity    Alcohol use: Never     Frequency: Never    Drug use: Never    Sexual activity: Yes     Partners: Male     Birth control/protection: None   Lifestyle    Physical activity     Days per week: Not on file     Minutes per session: Not on file    Stress: Not on file   Relationships    Social connections     Talks on phone: Not on file     Gets together: Not on file     Attends Worship service: Not on file     Active member of club or organization: Not on file     Attends meetings of clubs or organizations: Not on file     Relationship status: Not on file    Intimate partner violence     Fear of current or ex partner: Not on file     Emotionally abused: Not on file     Physically abused: Not on file     Forced sexual activity: Not on file   Other Topics Concern     Service Not Asked    Blood Transfusions Not Asked    Caffeine Concern Not Asked    Occupational Exposure Not Asked   Rejeana Elliott Hazards Not Asked    Sleep Concern Not Asked    Stress Concern Not Asked    Weight Concern Not Asked    Special Diet Not Asked    Back Care Not Asked    Exercise Not Asked    Bike Helmet Not Asked   [place] Not Asked    Self-Exams Not Asked   Social History Narrative    Not on file       No Known Allergies      Current Facility-Administered Medications:     sodium chloride (NS) flush 5-40 mL, 5-40 mL, IntraVENous, Q8H, Rosa Packer MD    sodium chloride (NS) flush 5-40 mL, 5-40 mL, IntraVENous, PRN, Rosa Packer MD    lactated Ringers infusion, 125 mL/hr, IntraVENous, CONTINUOUS, Rosa Packer MD, Last Rate: 125 mL/hr at 01/07/21 0406, 125 mL/hr at 01/07/21 0406    penicillin G pot (PFIZERPEN) 2.5 Million Units in 50 ml 0.9% NaCl, 2.5 Million Units, IntraVENous, Q4H, Chuy Packer MD, Last Rate: 100 mL/hr at 01/07/21 0920, 2.5 Million Units at 01/07/21 0920    acetaminophen (TYLENOL) tablet 650 mg, 650 mg, Oral, Q4H PRN, Regina Donaldson MD, 650 mg at 01/07/21 0451    NIFEdipine ER (PROCARDIA XL) tablet 30 mg, 30 mg, Oral, BID, Rosa Packer MD, 30 mg at 01/07/21 0920    betamethasone (CELESTONE) injection 12 mg, 12 mg, IntraMUSCular, Q24H, Rosa Packer MD, 12 mg at 01/07/21 0404    ondansetron (ZOFRAN) injection 4 mg, 4 mg, IntraVENous, Q4H PRN, Rosa Packer MD    magnesium sulfate 20 gm/500 mL Sterile Water infusion, 2 g/hr, IntraVENous, CONTINUOUS, Gutierrez SAGASTUME MD, Last Rate: 50 mL/hr at 01/07/21 0514, 2 g/hr at 01/07/21 0514    0.9% sodium chloride infusion, 125 mL/hr, IntraVENous, CONTINUOUS, Azalia Latham MD, Last Rate: 75 mL/hr at 01/07/21 0621, 75 mL/hr at 01/07/21 0621    Objective:     Vitals:     Patient Vitals for the past 24 hrs:   Temp Pulse Resp BP SpO2   01/07/21 1128     98 %   01/07/21 1123     100 %   01/07/21 1118     100 %   01/07/21 1113     97 %   01/07/21 1108     100 %   01/07/21 1103     100 %   01/07/21 1058     100 %   01/07/21 1053     99 %   01/07/21 1048     100 %   01/07/21 1043     100 %   01/07/21 1039  (!) 114  118/78    01/07/21 1038     99 %   01/07/21 1033     99 %   01/07/21 1028     100 %   01/07/21 1023     99 %   01/07/21 1013     99 %   01/07/21 1008     100 %   01/07/21 1003     99 %   01/07/21 0958     99 %   01/07/21 0953     99 %   01/07/21 0948     99 %   01/07/21 0943     99 %   01/07/21 0939  (!) 110  117/72    01/07/21 0938     99 %   01/07/21 0933     98 %   01/07/21 0928     99 %   01/07/21 0923     99 %   01/07/21 0918     98 %   01/07/21 0913     98 %   01/07/21 0908     97 %   01/07/21 0903     98 %   01/07/21 0858     98 %   01/07/21 0853     98 %   01/07/21 0848     98 %   01/07/21 0843     98 %   01/07/21 0840  (!) 117  113/64    01/07/21 0838     97 %   01/07/21 0833     99 %   01/07/21 0828     98 %   01/07/21 0823     98 %   01/07/21 0818     99 %   01/07/21 0813     99 %   01/07/21 0808     97 %   01/07/21 0803     99 %   01/07/21 0758     99 %   01/07/21 0753     97 %   01/07/21 0748 97.9 °F (36.6 °C)    98 %   01/07/21 0743     99 %   01/07/21 0738     98 %   01/07/21 0733     99 %   01/07/21 0729  (!) 114  122/67    01/07/21 0728     97 %   01/07/21 0723     98 %   01/07/21 0703     97 %   01/07/21 0700  (!) 113  112/71    01/07/21 0658     97 %   01/07/21 0653     97 %   01/07/21 0648     97 %   01/07/21 0644  (!) 113  119/63    01/07/21 0643     98 %   01/07/21 0638     97 %   01/07/21 0633     98 %   01/07/21 0630  (!) 117  123/66    01/07/21 0628     99 %   01/07/21 0623     98 %   01/07/21 0618     100 %   01/07/21 0614  (!) 116  120/65 92 %   01/07/21 0613     99 %   01/07/21 0608     99 %   01/07/21 0603     100 %   01/07/21 0559  (!) 114  129/71    01/07/21 0558     100 %   01/07/21 0552     100 %   01/07/21 0547     99 %   01/07/21 0542     100 %   01/07/21 0540  (!) 117  123/72    01/07/21 0513  (!) 118  132/78    01/07/21 0510     100 %   01/07/21 0508 98.8 °F (37.1 °C) (!) 120  132/81    01/07/21 0505     99 %   01/07/21 0503  (!) 120  128/81    01/07/21 0500     99 % 01/07/21 0458  (!) 118  (!) 141/88    01/07/21 0455     100 %   01/07/21 0453  (!) 127  103/62    01/07/21 0451     92 %   01/07/21 0449     100 %   01/07/21 0448  (!) 121  113/62    01/07/21 0444     95 %   01/07/21 0443  (!) 117  115/62    01/07/21 0442     (!) 82 %   01/07/21 0439     100 %   01/07/21 0438  (!) 110  (!) 105/54    01/07/21 0434     100 %   01/07/21 0433     (!) 84 %   01/07/21 0430  (!) 107  (!) 88/40    01/07/21 0429     99 %   01/07/21 0426     (!) 81 %   01/07/21 0424  (!) 115  (!) 98/49 98 %   01/07/21 0418  (!) 131  125/63 100 %   01/07/21 0414  (!) 131  122/66    01/07/21 0413     99 %   01/07/21 0408  (!) 133  127/75 99 %   01/07/21 0403     100 %   01/07/21 0402  (!) 137  137/69    01/07/21 0359  (!) 134  (!) 140/71    01/07/21 0355  (!) 136  (!) 155/80    01/07/21 0349  (!) 136  (!) 117/96    01/07/21 0335     97 %   01/07/21 0330     98 %   01/07/21 0329  (!) 117  (!) 139/99    01/07/21 0309 (!) 100.8 °F (38.2 °C) (!) 122 22 (!) 135/95 98 %        I&O:   01/07 0701 - 01/07 1900  In: -   Out: 1800 [Urine:1800]             01/05 1901 - 01/07 0700  In: 2462.9 [I.V.:2462.9]  Out: 150 [Urine:150]  Output by Drain (mL) 01/05/21 0701 - 01/05/21 1900 01/05/21 1901 - 01/06/21 0700 01/06/21 0701 - 01/06/21 1900 01/06/21 1901 - 01/07/21 0700 01/07/21 0701 - 01/07/21 1213   Patient has no LDAs of requested type attached. Exam:   Constitutional: Patient without distress. HEENT: Symmetric without facial palsy, uncorrected poor hearing or uncorrected poor vision. No thyroid enlargement or goiter  Chest: No use of accessory muscles or excessive work of breathing  Lungs: CTAB, no rales or crackles. No wheezing.    Heart:  irregular rate- tachycardia, regular rhythm, no murmurs, rubs, or gallops  Abdomen: gravid, soft, non-tender; Fundus: soft and non tender  Genitourinary: deferred as without complaints of labor or ROM at time of exam.   Lower Extremities:  Edema: No bilaterally  Patellar Reflexes: 1+ bilaterally  Clonus: absent  Skin: normal coloration and turgor, no rashes, no suspicious skin lesions noted. Neurologic: AOx3. Reflexes and motor strength normal and symmetric. Cranial nerves 2-12 and sensation grossly intact.   Psychiatric: Mood appropriate non focal    Maternal and Fetal Monitoring:                              Uterine Activity: Frequency (min): 7 Intensity: Mild  Fetal Heart Rate: Mode: ExternalFetal Heart Rate: 125      Labs:   CBC:    Recent Labs     01/07/21  0345 12/24/20  1118 11/30/20  1609 11/12/20  1350 10/26/20  1036 07/20/20  1612 07/20/20   WBC 7.1 7.5 9.8 9.4 7.9 7.6  --    HGB 10.9* 10.8* 12.1 11.6 11.4 12.3  --    HCT 34.2* 33.6* 38.0 34.6 34.5 36.5  --     341 426 423 364 390  --    HGBEXT  --   --   --   --   --   --  12.3   HCTEXT  --   --   --   --   --   --  36.5   PLTEXT  --   --   --   --   --   --  390       CMP:   Recent Labs     01/07/21  0344 12/24/20  1118 11/30/20  1609 11/12/20  1350 10/28/20  1508 10/26/20  1036   * 136 136 135  --  133*   K 3.2* 3.1* 4.2 4.0  --  3.9    104 101 103  --  101   CO2 20* 22 21 20  --  19*   AGAP 10 10  --   --   --   --    GLU 97 177* 189* 110*  --  156*   BUN 4* 6 5* 8  --  6   CREA 0.57* 0.57* 0.48* 0.44* 0.61 0.53*   GFRAA >60 >60 149 153 138 144   GFRNA >60 >60 129 133 119 125   CA 8.6 8.3 9.1 9.0  --  9.0   ALB 2.3* 2.4* 3.4* 3.4*  --  3.4*   TP 6.7 6.5 6.6 6.2  --  6.4   GLOB 4.4* 4.1*  --   --   --   --    AGRAT 0.5* 0.6* 1.1* 1.2  --  1.1*   ALT 23 14 13 11  --  12       Recent Labs     01/07/21  0344 12/24/20  1118 11/30/20  1609 11/12/20  1350 10/26/20  1036   URICA 4.4 3.0 2.3* 2.4* 2.2*   * 152 180 156 167     Recent Glucose Results: Recent Glucose Results:   Recent Labs     01/07/21  0344 12/24/20  1118 11/30/20  1609 11/12/20  1350 10/26/20  1036   GLU 97 177* 189* 110* 156*     Prenatal Labs:    Lab Results   Component Value Date/Time    Rubella, External immune 2020    HBsAg, External negative 2020    HIV, External NR 2020    RPR, External NR 2020       Imaging:   Date: to be performed 21 by MFM. Assessment and Plan:  37 y.o.  at [address] [facility] with   1) CHTN with Superiposed PreE. Likely severe disease even though only 1 severe range BP due to prompt treatment. Pt started on procardia XL upon admission after responding to 20mg po for BP 170s/110s. Hypertensive disorders of pregnancy occupy a continuum of varying severity. In the vast majority of cases they are associated with new-onset hypertension, which occurs most often after 20 weeks of gestation and frequently near term. Although often accompanied by new-onset proteinuria, hypertension and other signs or symptoms of preeclampsia may present in some women in the absence of proteinuria. Gestational hypertension without proteinuria will have similar impact on maternal and fetal condition as in pregnancies with proteinuria and diagnosis of preeclampsia. Clinical decision making should be predicated upon severity of hypertension rather than presence or absence of proteinuria. Diagnosis of preeclampsia depends on development of elevated BP (SBP>140, DBP >90) beyond 20 weeks gestation or worsening of chronic HTN. Proteinuria (>300mg/24hr or protein:creatine ratio >0.3) may or may not be present. The presence or absence of proteinuria is not predictive of maternal or fetal outcome. If proteinuria is absent, diagnosis requires at least one features of severe disease.      Severe Criteria:   · Blood Pressure: SBP >160, DBP >110 or higher on two occasions 4 hours apart,  · thrombocytopenia (platelet count <548U)  · impaired liver function (transaminaases increased greater than 2x normal)  · new renal insufficiency (Cr >1.1, or doubling in absence of other renal disease)  · pulmonary edema  · new onset of cerebral/visual disturbances. No therapy is recommended for new onset HTN for blood pressures consistently in mild range (SBP<160, DBP<110). However, use of anti-hypertensive therapy is recommended if SBP >160, DBP >110. First line therapy should be nifedipine or labetalol. If patient is requiring anti-htn medication initiation, she should remain in-patient to ensure stability. Recommendation for Delivery Timing for ANY hypertensive disorder with severe features is 34 weeks, sooner if uncontrolled, all timing adjusted based on maternal and fetal condition. Initiation of magnesium sulfate 6gm bolus, 2gm basal for severe disease. This should be continued for 24hr after delivery. Mode of delivery based on obstetric indications, rather than diagnosis of hypertensive disorder in pregnancy. Postpartum Care:   · Fluid shifts should be closely monitored in the postpartum setting, with strict I&Os every shift and daily weights. If diuresis has not occurred within 48 hours of delivery, patient is at elevated risk of pulmonary edema. · NSAIDs should be used preferentially over opiates for obstetric pain. However, use with caution postpartum in women with severe hypertension persisting for more than 1 days after delivery due to theoretic risk of exacerbating HTN. · Consider use of 20mg po lasix for first 5 days postpartum to minimize risk of development of severe disease postpartum. · BP monitoring as an inpatient for at least 72 hours postpartum, and again 7-10 days after delivery; consider home blood pressure monitoring. ([facility] preeclampsia monitoring program continues through 14 days pp.)   · If persistent HTN postpartum (SBP>150, DBP>100), recommend initiation of antihypertensive therapy. Longterm Risks:   Patient has increased risk of cardiovascular and cerebrovascular events in the future.  It has been shown that over a mean follow-up of 14.1 years, the relative risk of developing chronic hypertension in those with a history of preeclampsia was 3.7. In addition, hypertension in pregnancy predisposes to microalbuminuria and long lasting renal disease. Carefully chosen antihypertensives can lower the risk for both kidney and cardiac events among those with hypertension. All women with history of hypertensive disorders of pregnancy should maintain a healthy weight, stay active, avoid tobacco use, and be evaluated regularly for cardiovascular disease. The risk of recurrence (of any Hypertensive Disorder of Pregnancy) is approximately 40%. In future pregnancies, patient should initiate 162mg ASA by 14 weeks. Timing of delivery recommendations are based on ACOG Committee Opinion #560, 2013; \"Hypertension in Zero Daryl" task force report by ACOG, 2013; ACOG Practice Bulletins 222 and 203, 2019, 2020      2. COVID-19 in Pregnancy  Presentation and clinical findings for COVID-19 do not differentiate in pregnant women vs the general population. Typical presentation with COVID-19 includes   Common clinical features: Fever  Fatigue  Dry cough   Lymphopenia (<1.0?×?10? cells per L)   Myalgia   Sore throat   Loss of smell and taste    Additional findings which may be associated   Liver involvement (elevated transaminases, coagulopathies) and decreased platelets   Cardiac injury biomarkers   Conjunctivitis    Early data suggested that pregnant women may not be at higher risk for COVID-19 or have a more severe course if they become infected, however as more information has become available, this has come into question as ICU admissions happen in pregnancy at a greater rate than in the nonpregnant age-matched population. Additionally, the clinical course in pregnancy women seems to last longer than in an age-matched cohort.      Vertical Transmission- Primarily via respiratory droplets during the  period when neonates are exposed to others with COVID-19   Limited reports regarding intrapartum or peripartum transmission    Clinical significance unclear   Concern remains for possibility of infection beginning in first trimester with impact on placental and fetal well-being longterm.  No evidence of placental infection based on ultrasound today. Symptomatic patients with COVID-19 should remain on Transmission-Based Precautions until   At least 3 days (72 hours) have passed since recovery defined as resolution of fever without the use of fever-reducing medications and improvement in respiratory symptoms (e.g., cough, shortness of breath) and   At least 10 days have passed since symptoms first appeared    Concern that current HTN in pregnancy is due to active COVID. Will closely monitor for worsening from both COVID and HTN disorder perspectives. If worsening respiratory function or other maternal/fetal status, will need to consider delivery. If concern that will need intubation, may require transfer to [facility]. This patient first exhibited Sx on 21. Her test was positive on 21. She should remain on transmission-based precautions for 10 days from symptom onset (21) if no further fevers and continues to improve systemically. 3)  Labor- Patient stable at this time. Amnisure negative at noon . Fewer contractions. Pt should continue prometrium PV qhs. Crinone cream if pt unable to use her own. Recommendations (Based on ACOG Practice Bulletin # 127, 2012)  · For gestational ages 25 0/7-34 0/7 weeks, steroid course is recommended if there is risk of delivery within 7 days. · If gestational age is <32 0/7 weeks, magnesium sulfate may be used for cerebral palsy severity reduction. · Dose- 4gm bolus/2gm basal.   · Magnesium used for this reason should be discontinued at 12-24 hrs if delivery is not imminent.   · May consider magnesium sulfate for neuroprotection on a case by case basis in 32-34 weeks period but it is unlikely to be of neuroprotective benefit. · Continuous FHR and tocometry until stability determined. · GBS pending. · Antibiotics for GBS prophylaxis as indicated with IV Ampicillin (if not Penicillin Allergic). · If history of HSV, recommend beginning HSV prophylaxis with acyclovir. Tocolysis with magnesium sulfate, calcium channel blockers, or NSAIDs for short-term prolongation of pregnancy (up to 48 hours) to allow for the administration of  steroids. Long term tocolysis has limited efficacy in literature for prolongation of pregnancy. At this time, continue mag sulfate and procardia for both seizure prophylaxis and tocolysis. Clinical picture of preE due to [facility] vs due to placental dysfunction from [facility] is unclear. Also unclear is etiology for PTL. Pt has had placental dysfunction for ~8 weeks with FGR and abnormal umbilical artery Dopplers. Fetal status is reassuring at this time. Will repeat Dopplers on 21. Rodrigue consult (physician aware.)       Jamaal Mccormack MD   2021         Signed By:  Jamaal Mccormack MD     2021         Time 140    Minutes spent on floor,with greater than 50% of the time examining patient, explaining plan and coordinating care with nurse and requesting primary physician.

## 2021-01-07 NOTE — PROGRESS NOTES
Dr. Ralph Cons at the bedside. Interviewed pt. Labs ordered.  Pt admitted and hypertensive protocol begun with procardia 10 mg po

## 2021-01-07 NOTE — H&P
History & Physical    Name: Dee Sims MRN: [id number]  SSN: [id number]    YOB: 1985  Age: 37 y.o. Sex: female        Subjective: 36 y/o  at [address] [place] [address] who presents with 4 day h/o fevers, cough, right flank pain, and right LE pain. Pt notes good FM. She denies nausea, vomiting, chills, diarrhea, VB, LOF, uterine ctx, RUQ pain, CP, SOB, HA, scotomata, UTI symptoms, anorexia, or muscle aches. Pt has had no known exposure to COVID-19. Prenatal course c/b GHTN and  IUGR with abnormal doppler studies. Pt is followed by MFM. Estimated Date of Delivery: 21  OB History    Para Term  AB Living   2 1 0 1 0 0   SAB TAB Ectopic Molar Multiple Live Births   0 0 0 0 0 1      # Outcome Date GA Lbr Edward/2nd Weight Sex Delivery Anes PTL Lv   2 Current            1  20 16w0d  0.105 kg M Vag-Spont  N ND      Birth Comments: delivered at home      Complications: Uterine fibroid        Please see prenatal records for details. Past Medical History:   Diagnosis Date    Anemia     Chlamydia     Disease of blood and blood forming organ     Essential hypertension     Postpartum depression     Rh negative state in antepartum period     Uterine fibroid      No past surgical history on file. Social History     Occupational History    Not on file   Tobacco Use    Smoking status: Never Smoker    Smokeless tobacco: Never Used   Substance and Sexual Activity    Alcohol use: Never     Frequency: Never    Drug use: Never    Sexual activity: Yes     Partners: Male     Birth control/protection: None     Family History   Problem Relation Age of Onset    Schizophrenia Brother        No Known Allergies  Prior to Admission medications    Medication Sig Start Date End Date Taking? Authorizing Provider   Blood Pressure Test Kit-Medium kit 1 Each by Does Not Apply route daily.  Prescribe cuff her insurance will cover 20  Yes Farooq Darling MD   cholecalciferol, vitamin D3, (Vitamin D3) 50 mcg (2,000 unit) tab Take  by mouth. Yes Provider, Historical   calcium carbonate (TUMS) 200 mg calcium (500 mg) chew Take 1 Tab by mouth daily. Yes Provider, Historical   Iron Fum & PS Cmp-FA-Vit C-B3 (Integra F) 125-1-40-3 mg cap Take 1 Each by mouth daily. Indications: anemia from inadequate iron 12/28/20   Salvador Aaron MD   famotidine (PEPCID) 20 mg tablet Take 1 Tab by mouth two (2) times a day. Indications: gastroesophageal reflux disease 11/23/20   Polo Harper MD   progesterone (PROMETRIUM) 200 mg capsule Insert 1 Cap into vagina two (2) times a day. Patient taking differently: Insert 200 mg into vagina nightly. 9/15/20   Juanpablo Rodriguez NP   aspirin 81 mg chewable tablet Take 162 mg by mouth daily. Provider, Historical        Review of Systems:  Constitutional:No headache  Cardiac:   No chest pain      Resp: No shortness of breath     GI:   No nausea/vomiting, diarrhea   :   No dysuria  Neuro:     No vision changes, headache      Objective:     Vitals:  Vitals:    01/07/21 0309 01/07/21 0312   BP: (!) 135/95    Pulse: (!) 122    Resp: 22    Temp: (!) 100.8 °F (38.2 °C)    SpO2: 98%    Weight:  74.4 kg (164 lb)   Height:  5' 1\" (1.549 m)        Physical Exam:  Patient without distress.   Heart: Regular rate and rhythm  Lung: clear to auscultation throughout lung fields, no wheezes, no rales, no rhonchi and normal respiratory effort  Back: costovertebral angle tenderness absent  Abdomen: soft, nontender  Fundus: soft and non tender  Cervical Exam: Closed/Thick/High  Lower Extremities:  - Edema No; Bing's negative, no calf TTP  Membranes:  Intact  Fetal Heart Rate: Baseline: 150 per minute  Variability: moderate  Accelerations: yes  Decelerations: none  Uterine contractions: none  Abdominal ultrasound: vtx; AFV is normal    Prenatal Labs:   Lab Results   Component Value Date/Time    Rubella, External immune 07/20/2020    HBsAg, External negative 07/20/2020    HIV, External NR 2020    RPR, External NR 2020         Assessment/Plan:     Ms. Tianna Reed is a  seen with pregnancy at [address] [place] [address] for 4 days of fevers, right flank pain, severe range hypertension and right lower extremity pain. Pt with GHTN, IUGR and abnormal doppler studies followed by M. Plan:     Admit for evaluation of the above. Obtain the following: PEC labs, amylase, lipase, lactic acid, COVID-19 testing, UA C&S, CXR, right LE doppler. Administer procardia/Magnesium sulfate/steroids/IV PCN. Management d/w Dr. Arden Weinstein (MFM) and Dr. Abbi Lynch (pt's PObP).

## 2021-01-07 NOTE — PROGRESS NOTES
Pt transferred to Room 437. Chest xray done in [place] and ultrasound tech at bedside performing doppler ultrasound of right leg. Reviewed plan of care with pt.

## 2021-01-07 NOTE — PROGRESS NOTES
High Risk Obstetrics Progress Note    Name: Ronit Molina MRN: [id number]  SSN: [id number]    YOB: 1985  Age: 37 y.o. Sex: female      Subjective:      LOS: 0 days    Estimated Date of Delivery: 21   Gestational Age Today: 31w6d     Patient admitted for preeclampsia and Covid 23. States she does have normal fetal movement and does not have abdominal pain  , chest pain, contractions, fever, headache , nausea and vomiting, shortness of breath and vaginal bleeding . Objective:     Vitals:  Blood pressure 133/74, pulse (!) 113, temperature 97.8 °F (36.6 °C), resp. rate 18, height 5' 1\" (1.549 m), weight 164 lb (74.4 kg), last menstrual period 2020, SpO2 99 %. Temp (24hrs), Av.8 °F (37.1 °C), Min:97.8 °F (36.6 °C), Max:100.8 °F (06.9 °C)    Systolic (36RTG), YPM:450 , Min:88 , CMJ:898      Diastolic (68ODF), XGE:42, Min:40, Max:112       Intake and Output:     Date 21 0700 - 21 0659   Shift 3148-6527 0390-1768 7297-8359 24 Hour Total   INTAKE   Shift Total(mL/kg)       OUTPUT   Urine(mL/kg/hr) 1800   1800   Shift Total(mL/kg) 1800(24.2)   1800(24.2)   Weight (kg) 74.4 74.4 74.4 74.4       Physical Exam:  Patient without distress. Membranes:  Intact    Uterine Activity:  None    Fetal Heart Rate:  reassuring        Labs:   Recent Results (from the past 36 hour(s))   TYPE & SCREEN    Collection Time: 21  3:44 AM   Result Value Ref Range    Crossmatch Expiration 01/10/2021,2359     ABO/Rh(D) A NEGATIVE     Antibody screen NEG    PROTEIN/CREATININE RATIO, URINE    Collection Time: 21  3:44 AM   Result Value Ref Range    Protein, urine random 82 mg/dL    Creatinine, urine 136.00 mg/dL    Protein/Creat.  urine Ratio 0.6     URINALYSIS W/ RFLX MICROSCOPIC    Collection Time: 21  3:44 AM   Result Value Ref Range    Color YELLOW      Appearance CLOUDY      Specific gravity 1.016 1.001 - 1.023      pH (UA) 6.5 5.0 - 9.0      Protein 30 (A) NEG mg/dL    Glucose Negative mg/dL    Ketone >80 (A) NEG mg/dL    Bilirubin Negative NEG      Blood TRACE (A) NEG      Urobilinogen 0.2 0.2 - 1.0 EU/dL    Nitrites Negative NEG      Leukocyte Esterase LARGE (A) NEG      WBC 10-20 0 /hpf    RBC 0-3 0 /hpf    Epithelial cells 3-5 0 /hpf    Bacteria 1+ (H) 0 /hpf    Mucus 1+ (H) 0 /lpf    Yeast YEAST WITH PSEUDOHYPHAE      Other observations RESULTS VERIFIED MANUALLY     AMYLASE    Collection Time: 01/07/21  3:44 AM   Result Value Ref Range    Amylase 81 25 - 115 U/L   LIPASE    Collection Time: 01/07/21  3:44 AM   Result Value Ref Range    Lipase 118 73 - 393 U/L   LACTIC ACID    Collection Time: 01/07/21  3:44 AM   Result Value Ref Range    Lactic acid 1.3 0.4 - 2.0 MMOL/L   SARS-COV-2    Collection Time: 01/07/21  3:44 AM   Result Value Ref Range    Specimen source Nasopharyngeal      COVID-19 rapid test Detected (AA) NOTD     LD    Collection Time: 01/07/21  3:44 AM   Result Value Ref Range     (H) 100 - 208 U/L   METABOLIC PANEL, COMPREHENSIVE    Collection Time: 01/07/21  3:44 AM   Result Value Ref Range    Sodium 134 (L) 136 - 145 mmol/L    Potassium 3.2 (L) 3.5 - 5.1 mmol/L    Chloride 104 98 - 107 mmol/L    CO2 20 (L) 21 - 32 mmol/L    Anion gap 10 7 - 16 mmol/L    Glucose 97 65 - 100 mg/dL    BUN 4 (L) 6 - 23 MG/DL    Creatinine 0.57 (L) 0.6 - 1.0 MG/DL    GFR est AA >60 >60 ml/min/1.73m2    GFR est non-AA >60 >60 ml/min/1.73m2    Calcium 8.6 8.3 - 10.4 MG/DL    Bilirubin, total 0.4 0.2 - 1.1 MG/DL    ALT (SGPT) 23 12 - 65 U/L    AST (SGOT) 32 15 - 37 U/L    Alk.  phosphatase 94 50 - 130 U/L    Protein, total 6.7 6.3 - 8.2 g/dL    Albumin 2.3 (L) 3.5 - 5.0 g/dL    Globulin 4.4 (H) 2.3 - 3.5 g/dL    A-G Ratio 0.5 (L) 1.2 - 3.5     URIC ACID    Collection Time: 01/07/21  3:44 AM   Result Value Ref Range    Uric acid 4.4 2.6 - 6.0 MG/DL   CBC WITH AUTOMATED DIFF    Collection Time: 01/07/21  3:45 AM   Result Value Ref Range    WBC 7.1 4.3 - 11.1 K/uL    RBC 4.20 4.05 - 5.2 M/uL    HGB 10.9 (L) 11.7 - 15.4 g/dL    HCT 34.2 (L) 35.8 - 46.3 %    MCV 81.4 79.6 - 97.8 FL    MCH 26.0 (L) 26.1 - 32.9 PG    MCHC 31.9 31.4 - 35.0 g/dL    RDW 16.7 (H) 11.9 - 14.6 %    PLATELET 335 224 - 983 K/uL    MPV 10.6 9.4 - 12.3 FL    ABSOLUTE NRBC 0.02 0.0 - 0.2 K/uL    DF AUTOMATED      NEUTROPHILS 68 43 - 78 %    LYMPHOCYTES 22 13 - 44 %    MONOCYTES 8 4.0 - 12.0 %    EOSINOPHILS 1 0.5 - 7.8 %    BASOPHILS 1 0.0 - 2.0 %    IMMATURE GRANULOCYTES 0 0.0 - 5.0 %    ABS. NEUTROPHILS 4.8 1.7 - 8.2 K/UL    ABS. LYMPHOCYTES 1.6 0.5 - 4.6 K/UL    ABS. MONOCYTES 0.6 0.1 - 1.3 K/UL    ABS. EOSINOPHILS 0.1 0.0 - 0.8 K/UL    ABS. BASOPHILS 0.0 0.0 - 0.2 K/UL    ABS. IMM. GRANS. 0.0 0.0 - 0.5 K/UL   GRP B STREP SCRN BY PCR    Collection Time: 21  4:47 AM    Specimen: VAGINAL/RECTAL   Result Value Ref Range    Special Requests: NO SPECIAL REQUESTS      Culture result: (A)       POSITIVE: GBS target nucleic acid is detected. Presumptive for GBS colonization. Culture result:        RESULTS VERIFIED, PHONED TO AND READ BACK BY  WES AMADO 4OB AT 0532 ON 21 HA     INFLUENZA A & B AG (RAPID TEST)    Collection Time: 21  5:12 AM   Result Value Ref Range    Influenza A Ag Negative NEG      Influenza B Ag Negative NEG      Source Nasopharyngeal     RUPTURE OF FETAL MEMBRANES, POC    Collection Time: 21 12:00 PM   Result Value Ref Range    Rupture of fetal membrane Negative Negative    Control line present? Acceptable     Internal negative control Acceptable     Kit Lot No. [id number]     Expiration date 23        Assessment and Plan:      Principal Problem:    COVID-19 affecting pregnancy in third trimester (2021)      Overview: Sx 21      Positive 21      Precautions through 21    Active Problems:     labor in third trimester without delivery (2021)      Leiomyoma of uterus affecting pregnancy in third trimester (2020)      Overview: 7 Fibroids seen at [facility]. Ranging from 2-6 cm. See High Risk Pregnancy Overview      High-risk pregnancy in third trimester (7/20/2020)      Overview: GTT-      Flu-      Tdap-      . ..            12/21/2020 at [facility]: BPP 8/8, ANIL 20cm, Stable elevated dopplers. Home BP       log WNL with couple elevations noted 140/90 x2. Most /80s. Will       have NST at hosp on Thurs 12/28/2020 at [facility]: Stable FGR, AC 5%, Overall 14%, ANIL 19cm, stable       elevated dopplers. PreE labs 12/24 stable. 1/4/2021 at [facility]: BPP 8/8 ANIL 13.4. Elevated dopplers-stable. Reasuring       fetal status, no signs of PreE. · Follow up M here twice weekly testing-here 1/7/21      · Growth every 2 weeks here 1/11/21      · Continue to work from home, modified rest and increased calories. Pregnancy affected by fetal growth restriction (9/17/2020)      Overview: 9/17/2020 [facility]: Severe symmetric. Normal ANIL for gestation. Not secondary       to dating error. Likely due to placenta overlying numerous fibroids. Cautious prognosis. 10/1/2020 [facility]: Appropriate interval growth, still growth restricted. Normal ANIL      12/28/2020 [facility]: Adequate interval growth; stable umbilical artery       Dopplers. EFW 14%      Chronic hypertension with exacerbation during pregnancy in third trimester (10/19/2020)      Overview: . .. · 11/12/20- normal Pre-E labs P/C ratio-121      · 11/16/20 24 hour urine-389      . .. 1/4/2021 at [facility]: BP stable today. Denies PreE symptoms. AEDF on right.       Placental insufficiency affecting management of mother in third trimester (11/16/2020)      Overview: SEE HRP OVERVIEW      GBS bacteriuria (1/14/2020)      Pre-eclampsia superimposed on chronic hypertension (1/7/2021)       Plan per M

## 2021-01-07 NOTE — PROGRESS NOTES
Pt cristal every 3-4 min. Mild to palpate. Pt states she feels slight tightening in lower abdomen. Assessment complete. Dr Siri Douglas notified of uterine contraction pattern. MD states to dip urine next time her lopez bag is emptied. Pt denies SOB, HA or blurred vision. SCD remain on.

## 2021-01-07 NOTE — PROGRESS NOTES
Labs sent IV started and Mg bolus begun.     Dr. Lionel Matthews ordered to hold Procardia XL order for now

## 2021-01-07 NOTE — PROGRESS NOTES
Pt arrived in [facility] with complaints of right leg pain. Fever since Monday with occasional cough, leg and hip pain, and abdominal pain. Pt short of breath when interviewing her but she states that is normal for her when she is nervous and her O2 saturation was 98%    Negative Bing's sign.  Placed on EF and Dr. Ke De La Garza made aware of patient arrival.

## 2021-01-07 NOTE — PROGRESS NOTES
Abnormal labs reported to Dr. Claude Sanchez. Orders received.  Bolus begun of 2l of LR to be followed by NS @ 75cc/hr      Will give liquid K+

## 2021-01-07 NOTE — PROGRESS NOTES
Pt called nurse to room stating she felt fluid leaking from vagina. Thighs wet with white particulates. Amnisure collected and Dr Irma Lorenzo notified. 1210- Amnisure neg. Pt denies pain. Pt has been using progesterone suppositories.

## 2021-01-08 LAB
ALBUMIN SERPL-MCNC: 2.4 G/DL (ref 3.5–5)
ALBUMIN/GLOB SERPL: 0.6 {RATIO} (ref 1.2–3.5)
ALP SERPL-CCNC: 100 U/L (ref 50–136)
ALT SERPL-CCNC: 19 U/L (ref 12–65)
ANION GAP SERPL CALC-SCNC: 14 MMOL/L (ref 7–16)
AST SERPL-CCNC: 23 U/L (ref 15–37)
BASOPHILS # BLD: 0 K/UL (ref 0–0.2)
BASOPHILS NFR BLD: 0 % (ref 0–2)
BILIRUB SERPL-MCNC: 0.4 MG/DL (ref 0.2–1.1)
BUN SERPL-MCNC: 4 MG/DL (ref 6–23)
CALCIUM SERPL-MCNC: 6.7 MG/DL (ref 8.3–10.4)
CHLORIDE SERPL-SCNC: 105 MMOL/L (ref 98–107)
CO2 SERPL-SCNC: 16 MMOL/L (ref 21–32)
COLLECT DURATION TIME UR: 24 HR
COLLECT DURATION TIME UR: 24 HR
CREAT 24H UR-MRATE: 1235 MG/24 HR (ref 600–1800)
CREAT SERPL-MCNC: 0.48 MG/DL (ref 0.6–1)
CREAT UR-MCNC: 19 MG/DL
DIASORIN SARS-COV-2 AB, IGG, RCVG3T: NEGATIVE
DIFFERENTIAL METHOD BLD: ABNORMAL
EOSINOPHIL # BLD: 0.3 K/UL (ref 0–0.8)
EOSINOPHIL NFR BLD: 3 % (ref 0.5–7.8)
ERYTHROCYTE [DISTWIDTH] IN BLOOD BY AUTOMATED COUNT: 17.2 % (ref 11.9–14.6)
EST. AVERAGE GLUCOSE BLD GHB EST-MCNC: 169 MG/DL
GLOBULIN SER CALC-MCNC: 3.7 G/DL (ref 2.3–3.5)
GLUCOSE SERPL-MCNC: 175 MG/DL (ref 65–100)
HBA1C MFR BLD: 7.5 % (ref 4.2–6.3)
HCT VFR BLD AUTO: 35.6 % (ref 35.8–46.3)
HGB BLD-MCNC: 10.8 G/DL (ref 11.7–15.4)
IMM GRANULOCYTES # BLD AUTO: 0.1 K/UL (ref 0–0.5)
IMM GRANULOCYTES NFR BLD AUTO: 1 % (ref 0–5)
LYMPHOCYTES # BLD: 1.4 K/UL (ref 0.5–4.6)
LYMPHOCYTES NFR BLD: 17 % (ref 13–44)
MCH RBC QN AUTO: 25.4 PG (ref 26.1–32.9)
MCHC RBC AUTO-ENTMCNC: 30.3 G/DL (ref 31.4–35)
MCV RBC AUTO: 83.6 FL (ref 79.6–97.8)
MONOCYTES # BLD: 0.6 K/UL (ref 0.1–1.3)
MONOCYTES NFR BLD: 7 % (ref 4–12)
NEUTS SEG # BLD: 6 K/UL (ref 1.7–8.2)
NEUTS SEG NFR BLD: 72 % (ref 43–78)
NRBC # BLD: 0.02 K/UL (ref 0–0.2)
PLATELET # BLD AUTO: 302 K/UL (ref 150–450)
PLATELET COMMENTS,PCOM: ADEQUATE
PMV BLD AUTO: 10.6 FL (ref 9.4–12.3)
POTASSIUM SERPL-SCNC: 4.2 MMOL/L (ref 3.5–5.1)
PROT 24H UR-MRATE: 1885 MG/24HR
PROT SERPL-MCNC: 6.1 G/DL (ref 6.3–8.2)
PROT UR-MCNC: 29 MG/DL
RBC # BLD AUTO: 4.26 M/UL (ref 4.05–5.2)
RBC MORPH BLD: ABNORMAL
SODIUM SERPL-SCNC: 135 MMOL/L (ref 136–145)
SPECIMEN VOL ?TM UR: 6500 ML
SPECIMEN VOL ?TM UR: 6500 ML
WBC # BLD AUTO: 8.4 K/UL (ref 4.3–11.1)
WBC MORPH BLD: ABNORMAL

## 2021-01-08 PROCEDURE — 65270000029 HC RM PRIVATE

## 2021-01-08 PROCEDURE — 76820 UMBILICAL ARTERY ECHO: CPT | Performed by: OBSTETRICS & GYNECOLOGY

## 2021-01-08 PROCEDURE — 74011250636 HC RX REV CODE- 250/636: Performed by: OBSTETRICS & GYNECOLOGY

## 2021-01-08 PROCEDURE — 76805 OB US >/= 14 WKS SNGL FETUS: CPT | Performed by: OBSTETRICS & GYNECOLOGY

## 2021-01-08 PROCEDURE — 85025 COMPLETE CBC W/AUTO DIFF WBC: CPT

## 2021-01-08 PROCEDURE — 83036 HEMOGLOBIN GLYCOSYLATED A1C: CPT

## 2021-01-08 PROCEDURE — 74011250637 HC RX REV CODE- 250/637: Performed by: OBSTETRICS & GYNECOLOGY

## 2021-01-08 PROCEDURE — 76819 FETAL BIOPHYS PROFIL W/O NST: CPT | Performed by: OBSTETRICS & GYNECOLOGY

## 2021-01-08 PROCEDURE — 36415 COLL VENOUS BLD VENIPUNCTURE: CPT

## 2021-01-08 PROCEDURE — 80053 COMPREHEN METABOLIC PANEL: CPT

## 2021-01-08 PROCEDURE — 74011000258 HC RX REV CODE- 258: Performed by: OBSTETRICS & GYNECOLOGY

## 2021-01-08 RX ORDER — DOCUSATE SODIUM 100 MG/1
100 CAPSULE, LIQUID FILLED ORAL 2 TIMES DAILY
Status: DISCONTINUED | OUTPATIENT
Start: 2021-01-08 | End: 2021-01-09

## 2021-01-08 RX ORDER — POLYETHYLENE GLYCOL 3350 17 G/17G
17 POWDER, FOR SOLUTION ORAL DAILY
Status: DISCONTINUED | OUTPATIENT
Start: 2021-01-08 | End: 2021-01-09

## 2021-01-08 RX ADMIN — POLYETHYLENE GLYCOL 3350 17 G: 17 POWDER, FOR SOLUTION ORAL at 14:22

## 2021-01-08 RX ADMIN — DOCUSATE SODIUM 100 MG: 100 CAPSULE ORAL at 14:22

## 2021-01-08 RX ADMIN — BETAMETHASONE SODIUM PHOSPHATE AND BETAMETHASONE ACETATE 12 MG: 3; 3 INJECTION, SUSPENSION INTRA-ARTICULAR; INTRALESIONAL; INTRAMUSCULAR; SOFT TISSUE at 04:00

## 2021-01-08 RX ADMIN — SODIUM CHLORIDE 2.5 MILLION UNITS: 9 INJECTION, SOLUTION INTRAVENOUS at 17:52

## 2021-01-08 RX ADMIN — ACETAMINOPHEN 650 MG: 325 TABLET, FILM COATED ORAL at 19:53

## 2021-01-08 RX ADMIN — SODIUM CHLORIDE 2.5 MILLION UNITS: 9 INJECTION, SOLUTION INTRAVENOUS at 01:00

## 2021-01-08 RX ADMIN — NIFEDIPINE 30 MG: 30 TABLET, FILM COATED, EXTENDED RELEASE ORAL at 09:41

## 2021-01-08 RX ADMIN — NIFEDIPINE 30 MG: 30 TABLET, FILM COATED, EXTENDED RELEASE ORAL at 17:52

## 2021-01-08 RX ADMIN — SODIUM CHLORIDE 2.5 MILLION UNITS: 9 INJECTION, SOLUTION INTRAVENOUS at 05:30

## 2021-01-08 RX ADMIN — SODIUM CHLORIDE 2.5 MILLION UNITS: 9 INJECTION, SOLUTION INTRAVENOUS at 21:36

## 2021-01-08 RX ADMIN — SODIUM CHLORIDE 2.5 MILLION UNITS: 9 INJECTION, SOLUTION INTRAVENOUS at 14:00

## 2021-01-08 RX ADMIN — MAGNESIUM SULFATE HEPTAHYDRATE 2 G/HR: 40 INJECTION, SOLUTION INTRAVENOUS at 01:00

## 2021-01-08 NOTE — PROCEDURES
Portable Ultrasound Procedure Report    Reason for Ultrasound- FGR  Requesting MD- Alt  Ultrasound performed at bedside for evaluation of pregnancy. Ultrasound Type: Transabdominal    Findings: Severe FGR, Reassuring fetal status. Reassuring fetal status. See detailed report to follow on chart. Estimated Fetal Weight:  1473 grams   Estimated Gestational Age by Ultrasound: 30 weeks 0 days, FGR  Fetal Position: Vertex  Amniotic Fluid: Normal, Amniotic Fluid Index was 12.2 centimeters. BPP-6/8, did not wait 30 min for Breathing. Umbilical Artery Doppler study: Elevated S/D ratio, no episodes of AEDF seen. Placenta: Anterior   Fetus: Normal detailed anatomy survey.

## 2021-01-08 NOTE — PROGRESS NOTES
MFM Antepartum Progress Note    37 y.o.  at 33w0d by Estimated Date of Delivery: 21. Chart reviewed. Vitals:    Patient Vitals for the past 24 hrs:   BP   21 0741 102/61   21 0639 112/77   21 0540 107/66   21 0439 113/72   21 0340 125/84   21 0239 111/73   21 0139 127/89   21 0039 121/82   21 2140 108/71   21 2040 121/80   21 1949 (!) 131/92   21 1948 (!) 131/93   21 1739 124/78   21 1640 132/89   21 1539 129/83   21 1439 130/89   21 1339 (!) 137/90   21 1239 133/74   21 1140 122/78   21 1039 118/78   21 0939 117/72   21 0840 113/64     Temp (24hrs), Av.8 °F (36.6 °C), Min:97.6 °F (36.4 °C), Max:98 °F (36.7 °C)      I&O:  No intake/output data recorded.              1901 -  0700  In: 4431.6 [I.V.:4431.6]  Out: 6250 [Urine:6250]        Maternal and Fetal Monitoring:                              Uterine Activity: Frequency (min): none noted Intensity: [place] Adjusted                     Fetal Heart Rate: Mode: ExternalFetal Heart Rate: 120          Labs:   CBC:    Recent Labs     21  0619 21  0345 20  1118 20  1609 20  1350 10/26/20  1036 20  1612 20   WBC 8.4 7.1 7.5 9.8 9.4 7.9 7.6  --    HGB 10.8* 10.9* 10.8* 12.1 11.6 11.4 12.3  --    HCT 35.6* 34.2* 33.6* 38.0 34.6 34.5 36.5  --     285 341 426 423 364 390  --    HGBEXT  --   --   --   --   --   --   --  12.3   HCTEXT  --   --   --   --   --   --   --  36.5   PLTEXT  --   --   --   --   --   --   --  390       CMP:   Recent Labs     21  0619 21  0344 20  1118 20  1609 20  1350 10/28/20  1508 10/26/20  1036   * 134* 136 136 135  --  133*   K 4.2 3.2* 3.1* 4.2 4.0  --  3.9    104 104 101 103  --  101   CO2 16* 20* 22 21 20  --  19*   AGAP 14 10 10  --   --   --   --    * 97 177* 189* 110*  --  156*   BUN 4* 4* 6 5* 8  --  6   CREA 0.48* 0.57* 0.57* 0.48* 0.44* 0.61 0.53*   GFRAA >60 >60 >60 149 153 138 144   GFRNA >60 >60 >60 129 133 119 125   CA 6.7* 8.6 8.3 9.1 9.0  --  9.0   ALB 2.4* 2.3* 2.4* 3.4* 3.4*  --  3.4*   TP 6.1* 6.7 6.5 6.6 6.2  --  6.4   GLOB 3.7* 4.4* 4.1*  --   --   --   --    AGRAT 0.6* 0.5* 0.6* 1.1* 1.2  --  1.1*   ALT 19 23 14 13 11  --  12       Recent Labs     01/07/21  0600 01/07/21  0344 12/24/20  1118 11/30/20  1609 11/12/20  1350 10/26/20  1036   URICA  --  4.4 3.0 2.3* 2.4* 2.2*   LDH  --  241* 152 180 156 167   PUQ 1,885  --   --   --   --   --        Recent Glucose Results: Recent Glucose Results:   Recent Labs     01/08/21  0619 01/07/21  0344 12/24/20  1118 11/30/20  1609 11/12/20  1350 10/26/20  1036   * 97 177* 189* 110* 156*       Prenatal Labs:    Lab Results   Component Value Date/Time    Rubella, External immune 07/20/2020    HBsAg, External negative 07/20/2020    HIV, External NR 07/20/2020    RPR, External NR 07/20/2020       Assessment and Plan:   1) Continue Mg today, will consider stopping and continue expectantin inpatient monitoring and delivery at 34 weeks. 2) US today for BPP and UA Doppler Study. 3) I will come see patient after US and discuss plan with Primary OB.     Rose Mary Khanna MD  1/8/2021

## 2021-01-08 NOTE — PROGRESS NOTES
Shift assessment completed, pt denies needs/questions/concerns. Segura draining well. 24 hour urine in progress. Pt reports +FM, no VB or LOF. Denies headache, epigastric pain and other pre-ecl symptoms.

## 2021-01-08 NOTE — PROGRESS NOTES
Ante Partum High Risk Pregnancy Note    Patient admitted for preeclampsia and covid states she does not  have  contractions, vaginal bleeding , swelling and vaginal leaking of fluid . Vitals: Temp (24hrs), Av.8 °F (36.6 °C), Min:97.6 °F (36.4 °C), Max:98 °F (36.7 °C)     Patient Vitals for the past 24 hrs:   BP   21 0741 102/61   21 0639 112/77   21 0540 107/66   21 0439 113/72   21 0340 125/84   21 0239 111/73   21 0139 127/89   21 0039 121/82   21 2140 108/71   21 2040 121/80   21 1949 (!) 131/92   21 1948 (!) 131/93   21 1739 124/78   21 1640 132/89   21 1539 129/83   21 1439 130/89   21 1339 (!) 137/90   21 1239 133/74   21 1140 122/78   21 1039 118/78   21 0939 117/72       I&O:   No intake/output data recorded.  1901 -  0700  In: 4431.6 [I.V.:4431.6]  Out: 6250 [Urine:6250]    Exam:  Patient without distress. Heart rrr  Lungs cta b&s                Abdomen: soft, non-tender               Fundus: soft and non tender                              Right Upper Quadrant: non-tender               Perineum: No sign of blood or amniotic fluid               Lower Extremities: No                            Fetal Monitoring:  Reactive    Uterine Activity: None             Lab/Data Review: All lab results for the last 24 hours reviewed. Assessment and Plan:      Principal Problem:    COVID-19 affecting pregnancy in third trimester (2021)      Overview: Sx 21      Positive 21      Precautions through 21    Active Problems:     labor in third trimester without delivery (2021)      Leiomyoma of uterus affecting pregnancy in third trimester (2020)      Overview: 7 Fibroids seen at [facility]. Ranging from 2-6 cm.             See High Risk Pregnancy Overview      High-risk pregnancy in third trimester (2020)      Overview: GTT- Flu-      Tdap-      . ..            12/21/2020 at [facility]: BPP 8/8, ANIL 20cm, Stable elevated dopplers. Home BP       log WNL with couple elevations noted 140/90 x2. Most /80s. Will       have NST at hosp on Thurs 12/28/2020 at [facility]: Stable FGR, AC 5%, Overall 14%, ANIL 19cm, stable       elevated dopplers. PreE labs 12/24 stable. 1/4/2021 at [facility]: BPP 8/8 ANIL 13.4. Elevated dopplers-stable. Reasuring       fetal status, no signs of PreE. · Follow up MFM here twice weekly testing-here 1/7/21      · Growth every 2 weeks here 1/11/21      · Continue to work from home, modified rest and increased calories. Pregnancy affected by fetal growth restriction (9/17/2020)      Overview: 9/17/2020 [facility]: Severe symmetric. Normal ANIL for gestation. Not secondary       to dating error. Likely due to placenta overlying numerous fibroids. Cautious prognosis. 10/1/2020 [facility]: Appropriate interval growth, still growth restricted. Normal ANIL      12/28/2020 [facility]: Adequate interval growth; stable umbilical artery       Dopplers. EFW 14%      Chronic hypertension with exacerbation during pregnancy in third trimester (10/19/2020)      Overview: . .. · 11/12/20- normal Pre-E labs P/C ratio-121      · 11/16/20 24 hour urine-389      . .. 1/4/2021 at [facility]: BP stable today. Denies PreE symptoms. AEDF on right. Placental insufficiency affecting management of mother in third trimester (11/16/2020)      Overview: SEE HRP OVERVIEW      GBS bacteriuria (1/14/2020)      Pre-eclampsia superimposed on chronic hypertension (1/7/2021)          Preeclampsia:  severe  Complete 48 hour course of steroids to promote fetal lung maturity. Deliver for Hemolytic Anemia-Elevated Liver Enzymes- Low Platelet Count syndrome, fetal nonreasurrance or worsening maternal condition. Had nicu consult. Hopefully discontinue mag and expectant management in hospital on procardia.

## 2021-01-08 NOTE — PROGRESS NOTES
Chart reviewed - patient admitted at [address] [place] [address] for preeclampsia, COVID. SW met with patient while social distancing w/appropriate PPE. Emotional support provided regarding admission. Per patient, she's looking forward to speaking with her physician today regarding her plan of care as she has multiple questions at this time. Patient states that her fiance has been unable to visit her in the hospital due [facility]. Patient denied any needs from  at this time. SW will continue to follow.     EFREN Lyons-MABEL  [facility]

## 2021-01-09 ENCOUNTER — ANESTHESIA (OUTPATIENT)
Dept: LABOR AND DELIVERY | Age: 36
End: 2021-01-09
Payer: COMMERCIAL

## 2021-01-09 ENCOUNTER — APPOINTMENT (OUTPATIENT)
Dept: GENERAL RADIOLOGY | Age: 36
End: 2021-01-09
Attending: OBSTETRICS & GYNECOLOGY
Payer: COMMERCIAL

## 2021-01-09 ENCOUNTER — APPOINTMENT (OUTPATIENT)
Dept: CT IMAGING | Age: 36
End: 2021-01-09
Attending: INTERNAL MEDICINE
Payer: COMMERCIAL

## 2021-01-09 ENCOUNTER — APPOINTMENT (OUTPATIENT)
Dept: GENERAL RADIOLOGY | Age: 36
End: 2021-01-09
Attending: INTERNAL MEDICINE
Payer: COMMERCIAL

## 2021-01-09 ENCOUNTER — ANESTHESIA EVENT (OUTPATIENT)
Dept: LABOR AND DELIVERY | Age: 36
End: 2021-01-09
Payer: COMMERCIAL

## 2021-01-09 LAB
ALBUMIN SERPL-MCNC: 2 G/DL (ref 3.5–5)
ALBUMIN/GLOB SERPL: 0.7 {RATIO} (ref 1.2–3.5)
ALP SERPL-CCNC: 83 U/L (ref 50–136)
ALT SERPL-CCNC: 16 U/L (ref 12–65)
ANION GAP SERPL CALC-SCNC: 8 MMOL/L (ref 7–16)
ARTERIAL PATENCY WRIST A: ABNORMAL
ARTERIAL PATENCY WRIST A: ABNORMAL
AST SERPL-CCNC: 24 U/L (ref 15–37)
BACTERIA SPEC CULT: NORMAL
BASE DEFICIT BLD-SCNC: 7 MMOL/L
BASE DEFICIT BLD-SCNC: 7 MMOL/L
BASOPHILS # BLD: 0 K/UL (ref 0–0.2)
BASOPHILS NFR BLD: 0 % (ref 0–2)
BDY SITE: ABNORMAL
BDY SITE: ABNORMAL
BILIRUB SERPL-MCNC: 0.3 MG/DL (ref 0.2–1.1)
BUN SERPL-MCNC: 7 MG/DL (ref 6–23)
CALCIUM SERPL-MCNC: 8.1 MG/DL (ref 8.3–10.4)
CHLORIDE SERPL-SCNC: 107 MMOL/L (ref 98–107)
CO2 BLD-SCNC: 22 MMOL/L
CO2 BLD-SCNC: 24 MMOL/L
CO2 SERPL-SCNC: 21 MMOL/L (ref 21–32)
CREAT SERPL-MCNC: 0.59 MG/DL (ref 0.6–1)
D DIMER PPP FEU-MCNC: 19.88 UG/ML(FEU)
D DIMER PPP FEU-MCNC: >20 UG/ML(FEU)
DIFFERENTIAL METHOD BLD: ABNORMAL
EOSINOPHIL # BLD: 0 K/UL (ref 0–0.8)
EOSINOPHIL NFR BLD: 0 % (ref 0.5–7.8)
ERYTHROCYTE [DISTWIDTH] IN BLOOD BY AUTOMATED COUNT: 16.8 % (ref 11.9–14.6)
FERRITIN SERPL-MCNC: 31 NG/ML (ref 8–388)
GAS FLOW.O2 O2 DELIVERY SYS: ABNORMAL L/MIN
GAS FLOW.O2 O2 DELIVERY SYS: ABNORMAL L/MIN
GLOBULIN SER CALC-MCNC: 3 G/DL (ref 2.3–3.5)
GLUCOSE SERPL-MCNC: 148 MG/DL (ref 65–100)
HCO3 BLD-SCNC: 21.9 MMOL/L (ref 22–26)
HCO3 BLDV-SCNC: 20.4 MMOL/L (ref 23–28)
HCT VFR BLD AUTO: 31.1 % (ref 35.8–46.3)
HCT VFR BLD AUTO: 33.5 % (ref 35.8–46.3)
HGB BLD-MCNC: 10.5 G/DL (ref 11.7–15.4)
HGB BLD-MCNC: 9.5 G/DL (ref 11.7–15.4)
HISTORY CHECKED?,CKHIST: NORMAL
IMM GRANULOCYTES # BLD AUTO: 0.2 K/UL (ref 0–0.5)
IMM GRANULOCYTES NFR BLD AUTO: 2 % (ref 0–5)
LACTATE SERPL-SCNC: 1.5 MMOL/L (ref 0.4–2)
LYMPHOCYTES # BLD: 1.6 K/UL (ref 0.5–4.6)
LYMPHOCYTES NFR BLD: 16 % (ref 13–44)
MCH RBC QN AUTO: 25.8 PG (ref 26.1–32.9)
MCHC RBC AUTO-ENTMCNC: 31.3 G/DL (ref 31.4–35)
MCV RBC AUTO: 82.3 FL (ref 79.6–97.8)
MONOCYTES # BLD: 0.4 K/UL (ref 0.1–1.3)
MONOCYTES NFR BLD: 4 % (ref 4–12)
NEUTS SEG # BLD: 8 K/UL (ref 1.7–8.2)
NEUTS SEG NFR BLD: 78 % (ref 43–78)
NRBC # BLD: 0.15 K/UL (ref 0–0.2)
PCO2 BLDCO: 47 MMHG (ref 32–68)
PCO2 BLDCO: 56 MMHG (ref 32–68)
PH BLDCO: 7.2 [PH] (ref 7.15–7.38)
PH BLDCO: 7.24 [PH] (ref 7.15–7.38)
PLATELET # BLD AUTO: 296 K/UL (ref 150–450)
PMV BLD AUTO: 10.2 FL (ref 9.4–12.3)
PO2 BLDCO: 11 MMHG
PO2 BLDCO: 18 MMHG
POTASSIUM SERPL-SCNC: 3.9 MMOL/L (ref 3.5–5.1)
PROT SERPL-MCNC: 5 G/DL (ref 6.3–8.2)
RBC # BLD AUTO: 4.07 M/UL (ref 4.05–5.2)
SAO2 % BLD: 8 % (ref 95–98)
SAO2 % BLDV: 21 % (ref 65–88)
SERVICE CMNT-IMP: ABNORMAL
SERVICE CMNT-IMP: ABNORMAL
SERVICE CMNT-IMP: NORMAL
SODIUM SERPL-SCNC: 136 MMOL/L (ref 136–145)
SPECIMEN TYPE: ABNORMAL
SPECIMEN TYPE: ABNORMAL
WBC # BLD AUTO: 10.3 K/UL (ref 4.3–11.1)

## 2021-01-09 PROCEDURE — 71045 X-RAY EXAM CHEST 1 VIEW: CPT

## 2021-01-09 PROCEDURE — 36430 TRANSFUSION BLD/BLD COMPNT: CPT

## 2021-01-09 PROCEDURE — 76010000391 HC C SECN FIRST 1 HR: Performed by: OBSTETRICS & GYNECOLOGY

## 2021-01-09 PROCEDURE — 77030027138 HC INCENT SPIROMETER -A

## 2021-01-09 PROCEDURE — 74011250636 HC RX REV CODE- 250/636: Performed by: ANESTHESIOLOGY

## 2021-01-09 PROCEDURE — 74011250636 HC RX REV CODE- 250/636: Performed by: NURSE ANESTHETIST, CERTIFIED REGISTERED

## 2021-01-09 PROCEDURE — 74011250637 HC RX REV CODE- 250/637: Performed by: OBSTETRICS & GYNECOLOGY

## 2021-01-09 PROCEDURE — 75410000003 HC RECOV DEL/VAG/CSECN EA 0.5 HR: Performed by: OBSTETRICS & GYNECOLOGY

## 2021-01-09 PROCEDURE — 74011250636 HC RX REV CODE- 250/636: Performed by: OBSTETRICS & GYNECOLOGY

## 2021-01-09 PROCEDURE — 74011000250 HC RX REV CODE- 250: Performed by: ANESTHESIOLOGY

## 2021-01-09 PROCEDURE — 74011000250 HC RX REV CODE- 250: Performed by: NURSE ANESTHETIST, CERTIFIED REGISTERED

## 2021-01-09 PROCEDURE — 2709999900 HC NON-CHARGEABLE SUPPLY

## 2021-01-09 PROCEDURE — 77030002888 HC SUT CHRMC J&J -A: Performed by: OBSTETRICS & GYNECOLOGY

## 2021-01-09 PROCEDURE — 82803 BLOOD GASES ANY COMBINATION: CPT

## 2021-01-09 PROCEDURE — 85018 HEMOGLOBIN: CPT

## 2021-01-09 PROCEDURE — 77030032490 HC SLV COMPR SCD KNE COVD -B: Performed by: OBSTETRICS & GYNECOLOGY

## 2021-01-09 PROCEDURE — 77030002933 HC SUT MCRYL J&J -A: Performed by: OBSTETRICS & GYNECOLOGY

## 2021-01-09 PROCEDURE — 65270000029 HC RM PRIVATE

## 2021-01-09 PROCEDURE — 74011000258 HC RX REV CODE- 258: Performed by: OBSTETRICS & GYNECOLOGY

## 2021-01-09 PROCEDURE — 2709999900 HC NON-CHARGEABLE SUPPLY: Performed by: OBSTETRICS & GYNECOLOGY

## 2021-01-09 PROCEDURE — 77030034696 HC CATH URETH FOL 2W BARD -A: Performed by: OBSTETRICS & GYNECOLOGY

## 2021-01-09 PROCEDURE — 83605 ASSAY OF LACTIC ACID: CPT

## 2021-01-09 PROCEDURE — 59025 FETAL NON-STRESS TEST: CPT

## 2021-01-09 PROCEDURE — 94640 AIRWAY INHALATION TREATMENT: CPT

## 2021-01-09 PROCEDURE — 76060000078 HC EPIDURAL ANESTHESIA: Performed by: OBSTETRICS & GYNECOLOGY

## 2021-01-09 PROCEDURE — 87040 BLOOD CULTURE FOR BACTERIA: CPT

## 2021-01-09 PROCEDURE — 74011000250 HC RX REV CODE- 250: Performed by: OBSTETRICS & GYNECOLOGY

## 2021-01-09 PROCEDURE — 77030040830 HC CATH URETH FOL MDII -A

## 2021-01-09 PROCEDURE — 76010000392 HC C SECN EA ADDL 0.5 HR: Performed by: OBSTETRICS & GYNECOLOGY

## 2021-01-09 PROCEDURE — 71260 CT THORAX DX C+: CPT

## 2021-01-09 PROCEDURE — P9016 RBC LEUKOCYTES REDUCED: HCPCS

## 2021-01-09 PROCEDURE — 80053 COMPREHEN METABOLIC PANEL: CPT

## 2021-01-09 PROCEDURE — 74011250637 HC RX REV CODE- 250/637: Performed by: ANESTHESIOLOGY

## 2021-01-09 PROCEDURE — 85025 COMPLETE CBC W/AUTO DIFF WBC: CPT

## 2021-01-09 PROCEDURE — 77030007880 HC KT SPN EPDRL BBMI -B: Performed by: NURSE ANESTHETIST, CERTIFIED REGISTERED

## 2021-01-09 PROCEDURE — 99233 SBSQ HOSP IP/OBS HIGH 50: CPT | Performed by: OBSTETRICS & GYNECOLOGY

## 2021-01-09 PROCEDURE — 77030002974 HC SUT PLN J&J -A: Performed by: OBSTETRICS & GYNECOLOGY

## 2021-01-09 PROCEDURE — 74011250637 HC RX REV CODE- 250/637: Performed by: INTERNAL MEDICINE

## 2021-01-09 PROCEDURE — 94760 N-INVAS EAR/PLS OXIMETRY 1: CPT

## 2021-01-09 PROCEDURE — 77030003665 HC NDL SPN BBMI -A: Performed by: NURSE ANESTHETIST, CERTIFIED REGISTERED

## 2021-01-09 PROCEDURE — 88307 TISSUE EXAM BY PATHOLOGIST: CPT

## 2021-01-09 PROCEDURE — 82728 ASSAY OF FERRITIN: CPT

## 2021-01-09 PROCEDURE — 85379 FIBRIN DEGRADATION QUANT: CPT

## 2021-01-09 PROCEDURE — 74011000636 HC RX REV CODE- 636: Performed by: OBSTETRICS & GYNECOLOGY

## 2021-01-09 PROCEDURE — 77030031139 HC SUT VCRL2 J&J -A: Performed by: OBSTETRICS & GYNECOLOGY

## 2021-01-09 RX ORDER — NIFEDIPINE 10 MG/1
20 CAPSULE ORAL EVERY 6 HOURS
Status: DISCONTINUED | OUTPATIENT
Start: 2021-01-09 | End: 2021-01-10

## 2021-01-09 RX ORDER — MORPHINE SULFATE 0.5 MG/ML
INJECTION, SOLUTION EPIDURAL; INTRATHECAL; INTRAVENOUS
Status: COMPLETED | OUTPATIENT
Start: 2021-01-09 | End: 2021-01-09

## 2021-01-09 RX ORDER — IBUPROFEN 800 MG/1
800 TABLET ORAL
Status: CANCELLED | OUTPATIENT
Start: 2021-01-09

## 2021-01-09 RX ORDER — HYDROMORPHONE HYDROCHLORIDE 1 MG/ML
1 INJECTION, SOLUTION INTRAMUSCULAR; INTRAVENOUS; SUBCUTANEOUS
Status: DISCONTINUED | OUTPATIENT
Start: 2021-01-09 | End: 2021-01-10

## 2021-01-09 RX ORDER — TRISODIUM CITRATE DIHYDRATE AND CITRIC ACID MONOHYDRATE 500; 334 MG/5ML; MG/5ML
30 SOLUTION ORAL
Status: COMPLETED | OUTPATIENT
Start: 2021-01-09 | End: 2021-01-09

## 2021-01-09 RX ORDER — SODIUM CHLORIDE, SODIUM LACTATE, POTASSIUM CHLORIDE, CALCIUM CHLORIDE 600; 310; 30; 20 MG/100ML; MG/100ML; MG/100ML; MG/100ML
INJECTION, SOLUTION INTRAVENOUS
Status: DISCONTINUED | OUTPATIENT
Start: 2021-01-09 | End: 2021-01-09 | Stop reason: HOSPADM

## 2021-01-09 RX ORDER — IBUPROFEN 800 MG/1
800 TABLET ORAL
Status: DISCONTINUED | OUTPATIENT
Start: 2021-01-09 | End: 2021-01-10

## 2021-01-09 RX ORDER — SODIUM CHLORIDE 0.9 % (FLUSH) 0.9 %
10 SYRINGE (ML) INJECTION
Status: COMPLETED | OUTPATIENT
Start: 2021-01-09 | End: 2021-01-09

## 2021-01-09 RX ORDER — ACETAMINOPHEN 500 MG
1000 TABLET ORAL
Status: DISCONTINUED | OUTPATIENT
Start: 2021-01-09 | End: 2021-01-10

## 2021-01-09 RX ORDER — DIPHENHYDRAMINE HYDROCHLORIDE 50 MG/ML
12.5 INJECTION, SOLUTION INTRAMUSCULAR; INTRAVENOUS
Status: DISCONTINUED | OUTPATIENT
Start: 2021-01-09 | End: 2021-01-10

## 2021-01-09 RX ORDER — ACETAMINOPHEN 325 MG/1
650 TABLET ORAL ONCE
Status: ACTIVE | OUTPATIENT
Start: 2021-01-09 | End: 2021-01-10

## 2021-01-09 RX ORDER — OXYCODONE HYDROCHLORIDE 5 MG/1
5-10 TABLET ORAL
Status: CANCELLED | OUTPATIENT
Start: 2021-01-09

## 2021-01-09 RX ORDER — ASCORBIC ACID 500 MG
1000 TABLET ORAL 3 TIMES DAILY
Status: DISCONTINUED | OUTPATIENT
Start: 2021-01-09 | End: 2021-01-10

## 2021-01-09 RX ORDER — NALOXONE HYDROCHLORIDE 0.4 MG/ML
0.2 INJECTION, SOLUTION INTRAMUSCULAR; INTRAVENOUS; SUBCUTANEOUS
Status: DISCONTINUED | OUTPATIENT
Start: 2021-01-09 | End: 2021-01-10

## 2021-01-09 RX ORDER — SIMETHICONE 80 MG
80 TABLET,CHEWABLE ORAL
Status: DISCONTINUED | OUTPATIENT
Start: 2021-01-09 | End: 2021-01-10

## 2021-01-09 RX ORDER — ONDANSETRON 2 MG/ML
INJECTION INTRAMUSCULAR; INTRAVENOUS AS NEEDED
Status: DISCONTINUED | OUTPATIENT
Start: 2021-01-09 | End: 2021-01-09 | Stop reason: HOSPADM

## 2021-01-09 RX ORDER — SODIUM CHLORIDE, SODIUM LACTATE, POTASSIUM CHLORIDE, CALCIUM CHLORIDE 600; 310; 30; 20 MG/100ML; MG/100ML; MG/100ML; MG/100ML
125 INJECTION, SOLUTION INTRAVENOUS CONTINUOUS
Status: DISCONTINUED | OUTPATIENT
Start: 2021-01-09 | End: 2021-01-10

## 2021-01-09 RX ORDER — NIFEDIPINE 30 MG/1
20 TABLET, EXTENDED RELEASE ORAL EVERY 6 HOURS
Status: DISCONTINUED | OUTPATIENT
Start: 2021-01-09 | End: 2021-01-09

## 2021-01-09 RX ORDER — KETOROLAC TROMETHAMINE 30 MG/ML
30 INJECTION, SOLUTION INTRAMUSCULAR; INTRAVENOUS
Status: DISCONTINUED | OUTPATIENT
Start: 2021-01-09 | End: 2021-01-10

## 2021-01-09 RX ORDER — SODIUM CHLORIDE, SODIUM LACTATE, POTASSIUM CHLORIDE, CALCIUM CHLORIDE 600; 310; 30; 20 MG/100ML; MG/100ML; MG/100ML; MG/100ML
150 INJECTION, SOLUTION INTRAVENOUS CONTINUOUS
Status: DISCONTINUED | OUTPATIENT
Start: 2021-01-09 | End: 2021-01-09

## 2021-01-09 RX ORDER — OXYTOCIN/RINGER'S LACTATE 30/500 ML
PLASTIC BAG, INJECTION (ML) INTRAVENOUS
Status: DISCONTINUED | OUTPATIENT
Start: 2021-01-09 | End: 2021-01-09 | Stop reason: HOSPADM

## 2021-01-09 RX ORDER — DOCUSATE SODIUM 100 MG/1
100 CAPSULE, LIQUID FILLED ORAL 2 TIMES DAILY
Status: DISCONTINUED | OUTPATIENT
Start: 2021-01-09 | End: 2021-01-10

## 2021-01-09 RX ORDER — NALBUPHINE HYDROCHLORIDE 10 MG/ML
5 INJECTION, SOLUTION INTRAMUSCULAR; INTRAVENOUS; SUBCUTANEOUS
Status: DISCONTINUED | OUTPATIENT
Start: 2021-01-09 | End: 2021-01-10

## 2021-01-09 RX ORDER — UREA 10 %
220 LOTION (ML) TOPICAL DAILY
Status: DISCONTINUED | OUTPATIENT
Start: 2021-01-10 | End: 2021-01-10

## 2021-01-09 RX ORDER — BUPIVACAINE HYDROCHLORIDE 7.5 MG/ML
INJECTION, SOLUTION INTRASPINAL
Status: COMPLETED | OUTPATIENT
Start: 2021-01-09 | End: 2021-01-09

## 2021-01-09 RX ORDER — CEFAZOLIN SODIUM/WATER 2 G/20 ML
2 SYRINGE (ML) INTRAVENOUS
Status: COMPLETED | OUTPATIENT
Start: 2021-01-09 | End: 2021-01-09

## 2021-01-09 RX ORDER — OXYCODONE HYDROCHLORIDE 5 MG/1
10 TABLET ORAL
Status: DISCONTINUED | OUTPATIENT
Start: 2021-01-09 | End: 2021-01-10

## 2021-01-09 RX ORDER — POLYETHYLENE GLYCOL 3350 17 G/17G
17 POWDER, FOR SOLUTION ORAL DAILY
Status: DISCONTINUED | OUTPATIENT
Start: 2021-01-10 | End: 2021-01-10

## 2021-01-09 RX ORDER — ASCORBIC ACID 500 MG
1000 TABLET ORAL 3 TIMES DAILY
Status: DISCONTINUED | OUTPATIENT
Start: 2021-01-09 | End: 2021-01-09

## 2021-01-09 RX ORDER — ONDANSETRON 2 MG/ML
4 INJECTION INTRAMUSCULAR; INTRAVENOUS
Status: DISCONTINUED | OUTPATIENT
Start: 2021-01-09 | End: 2021-01-10

## 2021-01-09 RX ORDER — ALBUTEROL SULFATE 90 UG/1
2 AEROSOL, METERED RESPIRATORY (INHALATION)
Status: DISCONTINUED | OUTPATIENT
Start: 2021-01-09 | End: 2021-01-10

## 2021-01-09 RX ORDER — UREA 10 %
220 LOTION (ML) TOPICAL DAILY
Status: DISCONTINUED | OUTPATIENT
Start: 2021-01-10 | End: 2021-01-09

## 2021-01-09 RX ORDER — DIPHENHYDRAMINE HYDROCHLORIDE 50 MG/ML
25 INJECTION, SOLUTION INTRAMUSCULAR; INTRAVENOUS ONCE
Status: COMPLETED | OUTPATIENT
Start: 2021-01-09 | End: 2021-01-09

## 2021-01-09 RX ORDER — SODIUM CHLORIDE 9 MG/ML
250 INJECTION, SOLUTION INTRAVENOUS AS NEEDED
Status: DISCONTINUED | OUTPATIENT
Start: 2021-01-09 | End: 2021-01-10

## 2021-01-09 RX ORDER — SODIUM CHLORIDE 9 MG/ML
50 INJECTION, SOLUTION INTRAVENOUS CONTINUOUS
Status: DISCONTINUED | OUTPATIENT
Start: 2021-01-09 | End: 2021-01-09

## 2021-01-09 RX ORDER — ALBUTEROL SULFATE 0.83 MG/ML
2.5 SOLUTION RESPIRATORY (INHALATION)
Status: DISCONTINUED | OUTPATIENT
Start: 2021-01-09 | End: 2021-01-09

## 2021-01-09 RX ADMIN — PHENYLEPHRINE HYDROCHLORIDE 50 MCG: 10 INJECTION INTRAVENOUS at 13:10

## 2021-01-09 RX ADMIN — IBUPROFEN 800 MG: 800 TABLET, FILM COATED ORAL at 23:02

## 2021-01-09 RX ADMIN — SODIUM CHLORIDE 2.5 MILLION UNITS: 9 INJECTION, SOLUTION INTRAVENOUS at 01:29

## 2021-01-09 RX ADMIN — PHENYLEPHRINE HYDROCHLORIDE 50 MCG: 10 INJECTION INTRAVENOUS at 13:07

## 2021-01-09 RX ADMIN — ONDANSETRON 4 MG: 2 INJECTION INTRAMUSCULAR; INTRAVENOUS at 12:58

## 2021-01-09 RX ADMIN — SODIUM CHLORIDE, SODIUM LACTATE, POTASSIUM CHLORIDE, AND CALCIUM CHLORIDE 125 ML/HR: 600; 310; 30; 20 INJECTION, SOLUTION INTRAVENOUS at 18:16

## 2021-01-09 RX ADMIN — SODIUM CHLORIDE, SODIUM LACTATE, POTASSIUM CHLORIDE, AND CALCIUM CHLORIDE: 600; 310; 30; 20 INJECTION, SOLUTION INTRAVENOUS at 12:47

## 2021-01-09 RX ADMIN — SODIUM CHLORIDE 100 ML: 900 INJECTION, SOLUTION INTRAVENOUS at 19:03

## 2021-01-09 RX ADMIN — ACETAMINOPHEN 650 MG: 325 TABLET, FILM COATED ORAL at 05:33

## 2021-01-09 RX ADMIN — OXYTOCIN 500 ML/HR: 10 INJECTION, SOLUTION INTRAMUSCULAR; INTRAVENOUS at 13:18

## 2021-01-09 RX ADMIN — NIFEDIPINE 30 MG: 30 TABLET, FILM COATED, EXTENDED RELEASE ORAL at 08:51

## 2021-01-09 RX ADMIN — DIPHENHYDRAMINE HYDROCHLORIDE 25 MG: 50 INJECTION, SOLUTION INTRAMUSCULAR; INTRAVENOUS at 17:39

## 2021-01-09 RX ADMIN — NIFEDIPINE 20 MG: 10 CAPSULE ORAL at 19:49

## 2021-01-09 RX ADMIN — IOPAMIDOL 100 ML: 755 INJECTION, SOLUTION INTRAVENOUS at 19:03

## 2021-01-09 RX ADMIN — SODIUM CITRATE AND CITRIC ACID MONOHYDRATE 30 ML: 500; 334 SOLUTION ORAL at 12:30

## 2021-01-09 RX ADMIN — PHENYLEPHRINE HYDROCHLORIDE 50 MCG: 10 INJECTION INTRAVENOUS at 13:04

## 2021-01-09 RX ADMIN — DOCUSATE SODIUM 100 MG: 100 CAPSULE ORAL at 08:51

## 2021-01-09 RX ADMIN — BUPIVACAINE HYDROCHLORIDE IN DEXTROSE 9 MG: 7.5 INJECTION, SOLUTION SUBARACHNOID at 13:00

## 2021-01-09 RX ADMIN — OXYCODONE HYDROCHLORIDE AND ACETAMINOPHEN 1000 MG: 500 TABLET ORAL at 23:02

## 2021-01-09 RX ADMIN — MORPHINE SULFATE 0.25 MG: 0.5 INJECTION, SOLUTION EPIDURAL; INTRATHECAL; INTRAVENOUS at 13:00

## 2021-01-09 RX ADMIN — Medication 10 ML: at 19:03

## 2021-01-09 RX ADMIN — CEFAZOLIN SODIUM 2 G: 10 INJECTION, POWDER, FOR SOLUTION INTRAVENOUS at 12:30

## 2021-01-09 RX ADMIN — SODIUM CHLORIDE 2.5 MILLION UNITS: 9 INJECTION, SOLUTION INTRAVENOUS at 05:26

## 2021-01-09 RX ADMIN — FAMOTIDINE 20 MG: 10 INJECTION, SOLUTION INTRAVENOUS at 11:34

## 2021-01-09 RX ADMIN — SODIUM CHLORIDE 2.5 MILLION UNITS: 9 INJECTION, SOLUTION INTRAVENOUS at 08:52

## 2021-01-09 RX ADMIN — ACETAMINOPHEN 1000 MG: 500 TABLET ORAL at 17:39

## 2021-01-09 RX ADMIN — ALBUTEROL SULFATE 2 PUFF: 90 AEROSOL, METERED RESPIRATORY (INHALATION) at 19:00

## 2021-01-09 NOTE — PROGRESS NOTES
Spoke with Dr. Narciso Figueroa. He will re-rewrite vitamin recommendations. Offered pt plasma per her report. She did not want this yet. Not a candidate for antivirals at this time. Will follow oxygen saturation.

## 2021-01-09 NOTE — PROGRESS NOTES
Respirations are 36  Somewhat labored  HR tachycardic   Small amt of lochia  O2 Sat 100 on oxygen  Dr. Ever Brasher called and updated  Orders to removed pt from oxygen and call Dr. Mariama Moctezuma with sats below 96%

## 2021-01-09 NOTE — ANESTHESIA PROCEDURE NOTES
Spinal Block    Start time: 1/9/2021 12:52 PM  End time: 1/9/2021 1:00 PM  Performed by: Lan Saldivar MD  Authorized by: Lan Saldivar MD     Pre-procedure:   Indications: primary anesthetic  Preanesthetic Checklist: patient identified, risks and benefits discussed, anesthesia consent, patient being monitored and timeout performed    Timeout Time: 12:52          Spinal Block:   Patient Position:  Seated  Prep Region:  Lumbar  Prep: chlorhexidine      Location:  L3-4  Technique:  Single shot    Local Dose (mL):  3    Needle:   Needle Type:  Pencan  Needle Gauge:  25 G  Attempts:  1      Events: CSF confirmed, no blood with aspiration and no paresthesia        Assessment:  Insertion:  Uncomplicated  Patient tolerance:  Patient tolerated the procedure well with no immediate complications  Difficult flow of csf but adequate for block  All needles out intact, procedure tolerated well without problems

## 2021-01-09 NOTE — PROGRESS NOTES
Call received from Dr. Sara Holbrook. Will deliver pt by c/s today. Dr. Adriano Hector updated. Orders received to obtain medical hospitalist consult. Dr. Sara Holbrook and Dr. Selma Martinez notified. Arranging consult in progress.

## 2021-01-09 NOTE — PROGRESS NOTES
Pt c/o lower abdominal pain rated 5/10. Pt concerned she may be having contractions. EFM on and tracing. No contractions detected on strip. FTHs 130s with moderate variability and accelerations. Pt states pain is constant but comes and goes. Pain starts at her lower abdomen and down her legs to her knees and sometimes in her lower back area. Tylenol offered. Pt declined. Will continue to monitor.

## 2021-01-09 NOTE — PROGRESS NOTES
Morning assessment completed with pt sitting up on side of bed. Pt appears a little irritable this morning. C/o being \"hot and sweaty\". Oral temp taken twice, 98.0, 98.9. Denies SOB or chest pain. Reports some SOB earlier in night upon ambulating to bathroom. Respirations 28.  o2 sat 95-96% on room air. Lung field sounds clear bilateral.  -per pt this is not unusual.  DTR's +2  Bilaterally. Absent clonus. Pt denies any pain this morning. Reports + FM. Desires to go back to sleep. Pt instructed to call for worsening sx, SOB, chest pain, LOF, vag bleeding, cramping and prn. Verbalizes understanding. Report called to Dr. Pearl Jimenez MD to call back with plan of care.

## 2021-01-09 NOTE — ROUTINE PROCESS
SBAR OUT Report: Mother Verbal report given to BRYAN Addison RN on this patient, who is now being transferred to [facility] (unit) for routine progression of care. The patient is wearing a green \"Anesthesia-Duramorph\" band. Report consisted of patient's Situation, Background, Assessment and Recommendations (SBAR).  ID bands were compared with the identification form, and verified with the patient and receiving nurse. Information from the SBAR and Kardex and the [facility] Report was reviewed with the receiving nurse; opportunity for questions and clarification provided.

## 2021-01-09 NOTE — PROGRESS NOTES
Late entry due to hands on pt care. Pt crying, having anxiety attack regarding having early delivery. Pt scared and said \"afraid I'm going to die\"  She voiced fear over being alone with no one who loves her during delivery. Emotional support provided. Pt allowed opportunity to cry and express feelings. Sat with pt during her anxiety and held her hand. Eventually pt calmed down and was able to listen to doctors managing her care and consented to plan of care for primary c/s, and anesthesia.

## 2021-01-09 NOTE — PROGRESS NOTES
Dr. José Miguel Tripp called to unit. Has spoken with Dr. Yolanda Israel in regards to patient current condition and VS. Per Dr. José Miguel Tripp, Stat H&H to be drawn and Type and Cross 2 units PRBC's to be placed on hold at present time. Telephone order with read back. New orders relayed to patient RN, Yanci Tapia.

## 2021-01-09 NOTE — PROGRESS NOTES
Ante Partum High Risk Pregnancy Note    Patient admitted for covid and severe preeclampsia states she does not  have  headache , abdominal pain   and contractions. She denies respiratory issues. Vitals: Temp (24hrs), Av.1 °F (37.3 °C), Min:97.6 °F (36.4 °C), Max:101 °F (38.3 °C)     Patient Vitals for the past 24 hrs:   BP   21 0745 134/86   21 0529 (!) 129/90   21 2214 (!) 140/91   21 1937 127/86   21 1428 (!) 143/90   21 1039 108/67   21 0942 (!) 139/93   21 0941 (!)        I&O:   No intake/output data recorded.  1901 -  0700  In: 1968.7 [I.V.:1968.7]  Out: 2500 [Urine:2500]    Exam:  Patient without distress. Heart rrr  Lungs cta b& s                Abdomen: soft, non-tender               Fundus: soft and non tender                          Lower Extremities: No              Reassuring fht           Lab/Data Review: All lab results for the last 24 hours reviewed. Assessment and Plan:      Principal Problem:    COVID-19 affecting pregnancy in third trimester (2021)      Overview: Sx 21      Positive 21      Precautions through 21    Active Problems:     labor in third trimester without delivery (2021)      Leiomyoma of uterus affecting pregnancy in third trimester (2020)      Overview: 7 Fibroids seen at [facility]. Ranging from 2-6 cm. See High Risk Pregnancy Overview      High-risk pregnancy in third trimester (2020)      Overview: GTT-      Flu-      Tdap-      . ..            2020 at [facility]: BPP 8/8, ANIL 20cm, Stable elevated dopplers. Home BP       log WNL with couple elevations noted 140/90 x2. Most /80s. Will       have NST at hosp on 2020 at [facility]: Stable FGR, AC 5%, Overall 14%, ANIL 19cm, stable       elevated dopplers. PreE labs  stable. 2021 at [facility]: BPP  ANIL 13.4. Elevated dopplers-stable.  Reasuring       fetal status, no signs of PreE. · Follow up MFM here twice weekly testing-here 21      · Growth every 2 weeks here 21      · Continue to work from home, modified rest and increased calories. Pregnancy affected by fetal growth restriction (2020)      Overview: 2020 [facility]: Severe symmetric. Normal ANIL for gestation. Not secondary       to dating error. Likely due to placenta overlying numerous fibroids. Cautious prognosis. 10/1/2020 [facility]: Appropriate interval growth, still growth restricted. Normal ANIL      2020 [facility]: Adequate interval growth; stable umbilical artery       Dopplers. EFW 14%      Chronic hypertension with exacerbation during pregnancy in third trimester (10/19/2020)      Overview: . .. · 20- normal Pre-E labs P/C ratio-121      · 20 24 hour urine-389      . .. 2021 at [facility]: BP stable today. Denies PreE symptoms. AEDF on right. Placental insufficiency affecting management of mother in third trimester (2020)      Overview: SEE HRP OVERVIEW      GBS bacteriuria (2020)      Pre-eclampsia superimposed on chronic hypertension (2021)          Preeclampsia:  severe  Complete 48 hour course of steroids to promote fetal lung maturity. Covid. mfm worried with temp / tachycardia. Sat 95%. Repeat chest xray. 4 liters nc. Npo. May require  today with risk of bleeding, infection, damage to nearby organs. Pt is aware. Will reassess after xray.

## 2021-01-09 NOTE — L&D DELIVERY NOTE
Delivery Summary    Patient: Eve Sanchez MRN: [id number]  SSN: [id number]    YOB: 1985  Age: 37 y.o. Sex: female         Section Delivery Operative Report    Date of Surgery: 2021      Preoperative Diagnosis: intrauterine pregnancy with severe iugr, severe preeclampsia and worsening symptoms with covid. Delivery recommended per mfm. Postoperative Diagnosis: S/A with viable baby GIRL weight 3#5oz. travis Boo@[employer] 9@5  - baby to nicu     Procedure: Procedure(s):   SECTION    Surgeon(s) and Role:     * Rekha Sterling,  - Primary     * Ozzie Corona MD - Assisting     Anesthesia: Spinal    Findings: grossly normal tubes and ovaries. Large 5-6 cm fibroid on left. Multiple fibroids on right side. Intravenous Fluids:  1000cc    Urine Output: 100cc clear yellow     Estimated Blood Loss:    qbl [id number]     Complications: none     Specimens:   ID Type Source Tests Collected by Time Destination   1 :  Fresh Placenta  Ino Morales DO 2021 1329 Pathology         Procedure Detail:      The patient was taken to the operating room, where anesthesia was found to be adequate. The patient was prepped and draped in the normal sterile fashion. Pfannenstiel skin incision was made with the scalpel and carried down to the underlying fascia. The fascial incision was extended laterally with Mandujano scissors. This fascial incision was grasped with Kocher clamps, tented up, and the underlying rectus muscles were dissected bluntly. The inferior edge of the rectus fascia was grasped with Kocher clamps, tented up, and the underlying rectus muscle was dissected off sharply. The rectus muscle was divided in the midline bluntly. The peritoneum was entered bluntly and extended inferiorly and superiorly with Metzenbaum scissors. The bladder blade was then inserted. The vesicouterine peritoneum was identified, grasped with pick-ups and entered sharply with Metzenbaum scissors.  The bladder flap was then created digitally and the bladder blade was reinserted. A low transverse uterine incision was made with the scalpel and extended superiorly inferiorly with blunt finger dissection. The babys head was then delivered atraumatically with body to follow. The cord was clamped and cut and the baby was handed off to the waiting  care unit staff. Placenta was then expressed. The uterus was exteriorized and cleared of all clots and debris. The uterine incision was closed in single layer 0 Chromic in running locked suture with good hemostasis assured. The posterior cul-de-sac was washed with warm normal saline. Good hemostasis was again reassured. The uterus was returned to the abdominal cavity. The paracolic gutters were washed with warm normal saline. Good hemostasis was again reassured throughout. The peritoneum was closed with 2-0 Vicryl in a running fashion and the rectus muscles reapproximated in the midline using 0-chromic. The fascia was closed with 0 Vicryl  in a running fashion. Good hemostasis was assured. The subcuticular layers were reapproximated with 2-0 plain gut in interrupted fashion. The skin was closed with a 4-0 monocryl in a subcuticular fashion. Pt had some oozing along the left edge that was made hemostatic with 3-0 chromic. The patient tolerated the procedure well. Sponge, lap, and needle counts were correct times two and the patient was taken to recovery in stable condition. Baby was transferred to nicu. Signed By: Petra Dumont DO     2021             Information for the patient's :  Lynda Hugo [[id number]]       Labor Events:    Labor: No    Steroids: Full Course   Cervical Ripening Date/Time:       Cervical Ripening Type: None   Antibiotics During Labor: Yes   Rupture Identifier: Sac 1    Rupture Date/Time: 2021 1:16 PM   Rupture Type: AROM   Amniotic Fluid Volume:  Moderate    Amniotic Fluid Description: Clear    Amniotic Fluid Odor:      Induction: None       Induction Date/Time:        Indications for Induction:      Augmentation: None   Augmentation Date/Time:      Indications for Augmentation:     Labor complications: None       Additional complications:        Delivery Events:  Indications For Episiotomy:     Episiotomy: None   Perineal Laceration(s):     Repaired:     Periurethral Laceration Location:      Repaired:     Labial Laceration Location:     Repaired:     Sulcal Laceration Location:     Repaired:     Vaginal Laceration Location:     Repaired:     Cervical Laceration Location:     Repaired:     Repair Suture:     Number of Repair Packets:     Estimated Blood Loss (ml):  ml   Quantitaive Blood Loss (ml):             Delivery Date: 2021    Delivery Time: 1:17 PM   Delivery Type: , Low Transverse     Details    Trial of Labor: No   Primary/Repeat: Primary   Priority: Routine   Indications: Other (Add Comments) severe preeclampsia and iugr, covid      Sex:  Female     Gestational Age: 33w1d  Delivery Clinician:  Brian Sterling  Living Status: Living   Delivery Location: OR            APGARS  One minute Five minutes Ten minutes   Skin color:            Heart rate:            Grimace:            Muscle tone:            Breathing: Totals:              Presentation: Vertex    Position: Middle Occiput Anterior  Resuscitation Method:  Suctioning-bulb; Tactile Stimulation     Meconium Stained:        Cord Information: 3 Vessels  Complications: None  Cord around:    Delayed cord clamping? No  Cord clamped date/time:   Disposition of Cord Blood: Lab    Blood Gases Sent?: Yes    Placenta:  Date/Time: 2021  1:18 PM  Removal: Expressed      Appearance: Other (comment)      Measurements:  Birth Weight: 3 lb 5.3 oz (1.51 kg)      Birth Length: 1' 4.34\" (0.415 m)      Head Circumference: 11.12\" (0.282 m)      Chest Circumference: 9.74\" (0.247 m)     Abdominal Girth:       Other Providers:   TANYA WADE;CARLEY QUIGLEY;LUIS ARMANDO ARELLANO;JOHN RODRÍGUEZ;KIERRA RODRIGUEZ;PARTH PEREZ;MONA ROJAS;JOHN COOK;NAY MOREL, Obstetrician;Primary Nurse;Primary [place] Nurse;Respiratory Therapist;Neonatologist;Anesthesiologist;Crna;Scrub Tech;Physician             Group B Strep: No results found for: GRBSEXT, GRBSEXT  Information for the patient's :  Amilcar Cordova [[id number]]   No results found for: ABORH, PCTABR, PCTDIG, BILI, ABORHEXT, ABORH     Recent Labs     21  1349 21  1348   PCO2CB 47 56   PO2CB 18 11   HCO3I  --  21.9*   SO2I  --  8*   IBD 7 7   SPECTI VENOUS CORD ARTERIAL CORD   PHICB 7.24 7.20   ISITE CORD CORD   IDEV ROOM AIR ROOM AIR   IALLEN NOT APPLICABLE NOT APPLICABLE

## 2021-01-09 NOTE — PROGRESS NOTES
Patient is postop now.  He remains tachypneic in the mid 40s.  Denies any chest pain.  D-dimer is elevated.  This could be due to her recent  delivery but concern for PE remains due to Covid positive.  I have discussed with Dr. Collier who will be on-call overnight.  We will go ahead and order CT chest to rule out PE and or pneumonia.  Night hospitalist will follow.

## 2021-01-09 NOTE — PROGRESS NOTES
1245:  In OR;  Pre spinal 's  1307: Segura; post spinal 's  1309:  Prep  1312:  Timeout  1314: Incision  1317:  Viable female, take straight to warmer for SCN. Weight 3-5  1339[de-identified]  Incision closed  2508:  Dressing on   1352:  Out of OR  1355:  Received care of pt from Santa Hess CRNA. Pt a/o x 3. Denies c/o pain. NSR noted on cardiac monitor. Dressing to abdomen clean, dry and intact. Segura draining clear, yellow urine. SCD's in place bilaterally.   Denies SOB or chest pain

## 2021-01-09 NOTE — CONSULTS
HOSPITALIST H&P/CONSULT  NAME:  Dee Sims   Age:  37 y.o.  :   1985   MRN:   [id number]  PCP: Jacky Leggett MD  Consulting MD:  Treatment Team: Attending Provider: Vick Rocha MD; Consulting Provider: Hank Shi MD; Care Manager: Shivani Alexis; Utilization Review: Julia Klein  HPI:   37 F with PMH of Depression and Fibroids currently pregnant at term and admitted to OB-GYN service for delivery and was found to be positive for COVID-19 infection. She has been having some shortness of breath but her O2 sats on room air was 95%. Also spiked a temp of 101 early a.m. She has been placed on oxygen prior to delivery and . She denies any sick contacts, GI symptoms, headache, chest pain, body aches. Plan is to do her  delivery early in the afternoon. Patient is anxious about  and concerned about the . Hospitalist consulted for management of COVID-19 infection. Case was discussed with RN. 10 point ROS done and is negative except as noted in HPI. Past Medical History:   Diagnosis Date    Anemia     Chlamydia     Disease of blood and blood forming organ     Essential hypertension     Postpartum depression     Rh negative state in antepartum period     Uterine fibroid       No past surgical history on file. Prior to Admission Medications   Prescriptions Last Dose Informant Patient Reported? Taking? Blood Pressure Test Kit-Medium kit 2021 at Unknown time  No Yes   Si Each by Does Not Apply route daily. Prescribe cuff her insurance will cover   Iron Fum & PS Cmp-FA-Vit C-B3 (Integra F) 125-1-40-3 mg cap Not Taking at Unknown time  No No   Sig: Take 1 Each by mouth daily. Indications: anemia from inadequate iron   aspirin 81 mg chewable tablet Not Taking at Unknown time  Yes No   Sig: Take 162 mg by mouth daily.    calcium carbonate (TUMS) 200 mg calcium (500 mg) chew 2021 at Unknown time  Yes Yes   Sig: Take 1 Tab by mouth daily. cholecalciferol, vitamin D3, (Vitamin D3) 50 mcg (2,000 unit) tab 2021 at Unknown time  Yes Yes   Sig: Take  by mouth. famotidine (PEPCID) 20 mg tablet Not Taking at Unknown time  No No   Sig: Take 1 Tab by mouth two (2) times a day. Indications: gastroesophageal reflux disease   progesterone (PROMETRIUM) 200 mg capsule Not Taking at Unknown time  No No   Sig: Insert 1 Cap into vagina two (2) times a day. Patient taking differently: Insert 200 mg into vagina nightly. Facility-Administered Medications: None     Home meds reconciled. No Known Allergies   Social History     Tobacco Use    Smoking status: Never Smoker    Smokeless tobacco: Never Used   Substance Use Topics    Alcohol use: Never     Frequency: Never      Family History   Problem Relation Age of Onset    Schizophrenia Brother       Immunization History   Administered Date(s) Administered    Rho(D) Immune Globulin - IM 2020, 2020     Objective:     Visit Vitals  /86 (BP 1 Location: Left arm, BP Patient Position: Sitting)   Pulse (!) 121   Temp 98.8 °F (37.1 °C)   Resp 28   Ht 5' 1\" (1.549 m)   Wt 74.4 kg (164 lb)   LMP 2020   SpO2 100%   BMI 30.99 kg/m²      Temp (24hrs), Av °F (37.2 °C), Min:97.6 °F (36.4 °C), Max:101 °F (38.3 °C)    Oxygen Therapy  O2 Sat (%): 100 % (21 1027)  O2 Device: Room air (21 0745)  Physical Exam:  General:    Alert, cooperative, no distress   Head:   NCAT. No obvious deformity  Nose:  Nares normal. No drainage  Lungs:   CTABL. No wheezing/rhonchi/rales  Heart:   RRR. No m/r/g. Abdomen:   S/nt/nd. Bowel sounds normal.   Extremities: No cyanosis. Skin:     No rashes or lesions. Not Jaundiced  Neurologic: Moves all extremities.   no gross focal deficits      Data Review:   Recent Results (from the past 24 hour(s))   CBC WITH AUTOMATED DIFF    Collection Time: 21  9:08 AM   Result Value Ref Range    WBC 10.3 4.3 - 11.1 K/uL    RBC 4.07 4.05 - 5.2 M/uL    HGB 10.5 (L) 11.7 - 15.4 g/dL    HCT 33.5 (L) 35.8 - 46.3 %    MCV 82.3 79.6 - 97.8 FL    MCH 25.8 (L) 26.1 - 32.9 PG    MCHC 31.3 (L) 31.4 - 35.0 g/dL    RDW 16.8 (H) 11.9 - 14.6 %    PLATELET 489 399 - 509 K/uL    MPV 10.2 9.4 - 12.3 FL    ABSOLUTE NRBC 0.15 0.0 - 0.2 K/uL    DF AUTOMATED      NEUTROPHILS 78 43 - 78 %    LYMPHOCYTES 16 13 - 44 %    MONOCYTES 4 4.0 - 12.0 %    EOSINOPHILS 0 (L) 0.5 - 7.8 %    BASOPHILS 0 0.0 - 2.0 %    IMMATURE GRANULOCYTES 2 0.0 - 5.0 %    ABS. NEUTROPHILS 8.0 1.7 - 8.2 K/UL    ABS. LYMPHOCYTES 1.6 0.5 - 4.6 K/UL    ABS. MONOCYTES 0.4 0.1 - 1.3 K/UL    ABS. EOSINOPHILS 0.0 0.0 - 0.8 K/UL    ABS. BASOPHILS 0.0 0.0 - 0.2 K/UL    ABS. IMM. GRANS. 0.2 0.0 - 0.5 K/UL   LACTIC ACID    Collection Time: 21  9:40 AM   Result Value Ref Range    Lactic acid 1.5 0.4 - 2.0 MMOL/L     Imaging /Procedures /Studies:  I personally reviewed all labs, imaging, and other studies this admission:    CXR Results  (Last 48 hours)               21 0902  XR CHEST SNGL V Final result    Impression:  Impression: Findings most suspicious for viral pneumonitis given the provided   history. Narrative:  Portable chest:        History: +COVID 33 weeks pregnant. Comparison: 2021       Findings: A single view of the chest was obtained at 8:56 AM.       The cardiac and mediastinal silhouette are normal in size and configuration. There are increasing mild patchy bilateral hazy parenchymal opacities. There are   no pleural effusions. The pulmonary vascularity is within normal limits. CT Results  (Last 48 hours)    None          Assessment and Plan:      Active Hospital Problems    Diagnosis Date Noted     labor in third trimester without delivery 2021     Priority: 3 - Three    Pre-eclampsia superimposed on chronic hypertension 2021    COVID-19 affecting pregnancy in third trimester 2021     Sx 21  Positive 21  Precautions through 21      Placental insufficiency affecting management of mother in third trimester 2020     SEE HRP OVERVIEW      Chronic hypertension with exacerbation during pregnancy in third trimester 10/19/2020     . .. · 20- normal Pre-E labs P/C ratio-121  · 20 24 hour urine-389  . .. 2021 at [facility]: BP stable today. Denies PreE symptoms. AEDF on right.  Pregnancy affected by fetal growth restriction 2020 [facility]: Severe symmetric. Normal ANIL for gestation. Not secondary to dating error. Likely due to placenta overlying numerous fibroids. Cautious prognosis. 10/1/2020 [facility]: Appropriate interval growth, still growth restricted. Normal ANIL  2020 [facility]: Adequate interval growth; stable umbilical artery Dopplers. EFW 14%      High-risk pregnancy in third trimester 2020     GTT-  Flu-  Tdap-  . ..    2020 at [facility]: BPP 8/8, ANIL 20cm, Stable elevated dopplers. Home BP log WNL with couple elevations noted 140/90 x2. Most /80s. Will have NST at hosp on 2020 at [facility]: Stable FGR, AC 5%, Overall 14%, ANIL 19cm, stable elevated dopplers. PreE labs  stable. 2021 at [facility]: BPP 8/8 ANIL 13.4. Elevated dopplers-stable. Reasuring fetal status, no signs of PreE. · Follow up MFM here twice weekly testing-here 21  · Growth every 2 weeks here 21  · Continue to work from home, modified rest and increased calories.  Leiomyoma of uterus affecting pregnancy in third trimester 2020     7 Fibroids seen at [facility]. Ranging from 2-6 cm. See High Risk Pregnancy Overview      GBS bacteriuria 2020       PLAN  ·  labor/preeclampsia: Management per primary team.  · COVID-19 infection: Patient is 95% on room air and is not requiring oxygenn. She does not qualify for remdesivir treatment. I have offered her plasma infusion after the  delivery today and she wants to think about it. We will also add vitamin C, zinc.  Check ferritin but will avoid fibrinogen/D-dimer at this time given patient is going for . Give 1 unit of convalescent plasma if patient is agreeable. · Droplet plus isolation. · Will defer Covid related management of the  to the neonatologist.    FEN: N.p.o. for now, may have regular diet after   DVT ppx: Per primary team  Code status: Full code  Risk assessment: High risk  Plan of care discussed with: patient     Thank you for allowing us to participate in the care of this patient. We will follow along. Please call us with any questions.     Signed By: Barry Bonilla MD     2021

## 2021-01-09 NOTE — PROGRESS NOTES
Shift assessment completed per flow sheet. Pt denies complaints of LOF, vaginal bleeding, contractions, HA, epigastric pain, blurred vision or N/V. See flowsheet for details. POC reviewed with pt and pt verbalized understanding. Pt oriented to room and has call light within reach. Pt denies any needs at this time but will call out PRN. Pt now resting in bed.

## 2021-01-09 NOTE — PROGRESS NOTES
QUETA Antepartum Progress Note    37 y.o.  at 33w1d by Estimated Date of Delivery: 21. Patient admitted for chronic hypertension, preeclampsia and Covid. She complained of increased SOB this am.  Repeat CXR showed worsening COVID changes. Vitals:    Patient Vitals for the past 24 hrs:   BP   21 0745 134/86   21 0529 (!) 129/90   21 2214 (!) 140/91   21 1937 127/86   21 1428 (!) 143/90     Temp (24hrs), Av °F (37.2 °C), Min:97.6 °F (36.4 °C), Max:101 °F (38.3 °C)      I&O:  No intake/output data recorded.              1901 -  07  In: 1968.7 [I.V.:1968.7]  Out: 2500 [Urine:2500]        Labs:   CBC:    Recent Labs     21  0908 21  0619 21  1118 20  1609 20  1350 10/26/20  1036 20  1612 20   WBC 10.3 8.4 7.1 7.5 9.8 9.4 7.9 7.6  --    HGB 10.5* 10.8* 10.9* 10.8* 12.1 11.6 11.4 12.3  --    HCT 33.5* 35.6* 34.2* 33.6* 38.0 34.6 34.5 36.5  --     302 285 341 426 423 364 390  --    HGBEXT  --   --   --   --   --   --   --   --  12.3   HCTEXT  --   --   --   --   --   --   --   --  36.5   PLTEXT  --   --   --   --   --   --   --   --  390       CMP:   Recent Labs     21  0619 21  0344 20  1118 20  1609 20  1350 10/28/20  1508 10/26/20  1036   * 134* 136 136 135  --  133*   K 4.2 3.2* 3.1* 4.2 4.0  --  3.9    104 104 101 103  --  101   CO2 16* 20* 22 21 20  --  19*   AGAP 14 10 10  --   --   --   --    * 97 177* 189* 110*  --  156*   BUN 4* 4* 6 5* 8  --  6   CREA 0.48* 0.57* 0.57* 0.48* 0.44* 0.61 0.53*   GFRAA >60 >60 >60 149 153 138 144   GFRNA >60 >60 >60 129 133 119 125   CA 6.7* 8.6 8.3 9.1 9.0  --  9.0   ALB 2.4* 2.3* 2.4* 3.4* 3.4*  --  3.4*   TP 6.1* 6.7 6.5 6.6 6.2  --  6.4   GLOB 3.7* 4.4* 4.1*  --   --   --   --    AGRAT 0.6* 0.5* 0.6* 1.1* 1.2  --  1.1*   ALT 19 23 14 13 11  --  12       Recent Labs     21  0600 21  0344 12/24/20  1118 11/30/20  1609 11/12/20  1350 10/26/20  1036   URICA  --  4.4 3.0 2.3* 2.4* 2.2*   LDH  --  241* 152 180 156 167   PUQ 1,885  --   --   --   --   --        Recent Glucose Results: Recent Glucose Results:   Recent Labs     01/08/21  0619 01/07/21  0344 12/24/20  1118 11/30/20  1609 11/12/20  1350 10/26/20  1036   * 97 177* 189* 110* 156*       Prenatal Labs:    Lab Results   Component Value Date/Time    Rubella, External immune 07/20/2020    HBsAg, External negative 07/20/2020    HIV, External NR 07/20/2020    RPR, External NR 07/20/2020       Assessment and Plan:    1) PreE with Severe Features and severe FGR. I think her COVID disease is worsening and benefit to fetus with continued pregnancy is minimal.  I recommend proceeding with Delivery by C/S today. Discussed with Dr. Claire Arthur, she agrees. Present for C/S. Time:75    Minutes spent on floor,with greater than 50% of the time examining patient, explaining plan and coordinating care with nurse and requesting primary physician.         Nick Tom MD  1/9/2021

## 2021-01-09 NOTE — PROGRESS NOTES
Perfect serve message received from Dr. Dena Schneider for a stat d-dimer redraw. Order placed and notified lab.

## 2021-01-09 NOTE — PROGRESS NOTES
Labor Progress Note  Patient seen, fetal heart rate and contraction pattern evaluated, patient examined. Pt anxious but ready for  per m with worsening chest xray. Fob present and reportedly neg for covid. No data found. Physical Exam:  Fetal Heart Rate: Baseline: 180's with min to mod variability    Assessment/Plan:  Proceed with  Section  - Per m, severely growth restricted baby and not sure will tolerate labor. Discussed risk of bleeding that could require blood transfusion, infection, damage to nearby organs and need for bladder or bowel repair. Hx of fibroids. May require vertical incision on uterus. Baby will go straight to warmer. She is aware and agrees to proceed.

## 2021-01-09 NOTE — PROGRESS NOTES
Reactive NST obtained prior to pt getting up to bathroom. Then, MHR tracing approximately from 0360-3035. Adjusted EFM. FHR now tracing.

## 2021-01-09 NOTE — PROGRESS NOTES
Lab called stating patient D-dimer result was greater than 20.2. Per , she stated Anuelpat Mokan had never seen a post partum mom d-dimer that high. \"  didn't know if this was an error and if the MD would like a redraw. Perfect serve message sent to Dr. Angela Whelan, awaiting response.

## 2021-01-09 NOTE — ANESTHESIA PREPROCEDURE EVALUATION
Relevant Problems   CARDIOVASCULAR   (+) Chronic hypertension with exacerbation during pregnancy in third trimester   (+) Pre-eclampsia superimposed on chronic hypertension      HEMATOLOGY   (+) Anemia during pregnancy in third trimester       Anesthetic History   No history of anesthetic complications            Review of Systems / Medical History  Patient summary reviewed and pertinent labs reviewed    Pulmonary          Shortness of breath      Comments: paranchymal pneumonia, positive for covid   Neuro/Psych         Psychiatric history     Cardiovascular                  Exercise tolerance: <4 METS     GI/Hepatic/Renal     GERD: well controlled           Endo/Other             Other Findings   Comments: 32 week gest with covid for emerg delivery by csection           Physical Exam    Airway  Mallampati: II    Neck ROM: normal range of motion   Mouth opening: Normal     Cardiovascular               Dental         Pulmonary                 Abdominal         Other Findings            Anesthetic Plan    ASA: 4, emergent  Anesthesia type: spinal      Post-op pain plan if not by surgeon: intrathecal opiates      Anesthetic plan and risks discussed with: Patient

## 2021-01-09 NOTE — PROGRESS NOTES
Oxygen removed per [facility] order. o2 sats 94% on room air. Respirations 36/min. Called Dr. Juan Jose Godinez and updated. Orders received to call hospitalist.     Message left with Colby Rowan, nursing supervisor. Awaiting call.

## 2021-01-09 NOTE — PROGRESS NOTES
Dr. Kim  updated on pt status with resp 36/min at 02 sats 94% on room air. Orders received to keep 02 sats above 90. If o2 sats drop below 90, apply oxygen at 3-4 liters via NC. MD placed order for stat cxray. MD to review.

## 2021-01-09 NOTE — PROGRESS NOTES
Assumed patient care from [facility] RAMONITA AMADO.     Patient resting comfortably in bed, all covid precautions in place. Patient is not currently on magnesium infusion or continuous EFM, per prior nurse.

## 2021-01-10 ENCOUNTER — HOSPITAL ENCOUNTER (INPATIENT)
Age: 36
LOS: 6 days | Discharge: HOME | End: 2021-01-16
Attending: HOSPITALIST | Admitting: INTERNAL MEDICINE
Payer: COMMERCIAL

## 2021-01-10 VITALS
RESPIRATION RATE: 35 BRPM | OXYGEN SATURATION: 95 % | WEIGHT: 164 LBS | SYSTOLIC BLOOD PRESSURE: 122 MMHG | HEIGHT: 61 IN | BODY MASS INDEX: 30.96 KG/M2 | HEART RATE: 130 BPM | DIASTOLIC BLOOD PRESSURE: 82 MMHG | TEMPERATURE: 100.6 F

## 2021-01-10 DIAGNOSIS — J96.01 ACUTE RESPIRATORY FAILURE WITH HYPOXIA (HCC): ICD-10-CM

## 2021-01-10 DIAGNOSIS — U07.1 COVID-19 AFFECTING PREGNANCY IN THIRD TRIMESTER: ICD-10-CM

## 2021-01-10 DIAGNOSIS — Z98.891 H/O CESAREAN SECTION: ICD-10-CM

## 2021-01-10 DIAGNOSIS — J12.82 PNEUMONIA DUE TO COVID-19 VIRUS: ICD-10-CM

## 2021-01-10 DIAGNOSIS — O09.93 HIGH-RISK PREGNANCY IN THIRD TRIMESTER: ICD-10-CM

## 2021-01-10 DIAGNOSIS — U07.1 PNEUMONIA DUE TO COVID-19 VIRUS: ICD-10-CM

## 2021-01-10 DIAGNOSIS — R00.0 TACHYCARDIA: ICD-10-CM

## 2021-01-10 DIAGNOSIS — O98.513 COVID-19 AFFECTING PREGNANCY IN THIRD TRIMESTER: ICD-10-CM

## 2021-01-10 DIAGNOSIS — O36.5990 PREGNANCY AFFECTED BY FETAL GROWTH RESTRICTION: ICD-10-CM

## 2021-01-10 DIAGNOSIS — R06.02 SOB (SHORTNESS OF BREATH): ICD-10-CM

## 2021-01-10 DIAGNOSIS — O10.913 CHRONIC HYPERTENSION WITH EXACERBATION DURING PREGNANCY IN THIRD TRIMESTER: ICD-10-CM

## 2021-01-10 LAB
ALBUMIN SERPL-MCNC: 1.9 G/DL (ref 3.5–5)
ALBUMIN/GLOB SERPL: 0.6 {RATIO} (ref 1.2–3.5)
ALP SERPL-CCNC: 82 U/L (ref 50–130)
ALT SERPL-CCNC: 22 U/L (ref 12–65)
ANION GAP SERPL CALC-SCNC: 9 MMOL/L (ref 7–16)
ARTERIAL PATENCY WRIST A: YES
AST SERPL-CCNC: 48 U/L (ref 15–37)
BASE DEFICIT BLD-SCNC: 2 MMOL/L
BASOPHILS # BLD: 0 K/UL (ref 0–0.2)
BASOPHILS # BLD: 0 K/UL (ref 0–0.2)
BASOPHILS NFR BLD: 0 % (ref 0–2)
BASOPHILS NFR BLD: 0 % (ref 0–2)
BDY SITE: ABNORMAL
BILIRUB SERPL-MCNC: 0.4 MG/DL (ref 0.2–1.1)
BNP SERPL-MCNC: 59 PG/ML (ref 5–125)
BUN SERPL-MCNC: 8 MG/DL (ref 6–23)
CALCIUM SERPL-MCNC: 8 MG/DL (ref 8.3–10.4)
CHLORIDE SERPL-SCNC: 104 MMOL/L (ref 98–107)
CO2 BLD-SCNC: 23 MMOL/L
CO2 SERPL-SCNC: 23 MMOL/L (ref 21–32)
COLLECT TIME,HTIME: 1225
CREAT SERPL-MCNC: 0.64 MG/DL (ref 0.6–1)
D DIMER PPP FEU-MCNC: 3.48 UG/ML(FEU)
DIFFERENTIAL METHOD BLD: ABNORMAL
DIFFERENTIAL METHOD BLD: ABNORMAL
EOSINOPHIL # BLD: 0 K/UL (ref 0–0.8)
EOSINOPHIL # BLD: 0 K/UL (ref 0–0.8)
EOSINOPHIL NFR BLD: 0 % (ref 0.5–7.8)
EOSINOPHIL NFR BLD: 0 % (ref 0.5–7.8)
ERYTHROCYTE [DISTWIDTH] IN BLOOD BY AUTOMATED COUNT: 20.1 % (ref 11.9–14.6)
ERYTHROCYTE [DISTWIDTH] IN BLOOD BY AUTOMATED COUNT: 20.1 % (ref 11.9–14.6)
FLOW RATE ISTAT,IFRATE: 9 L/MIN
GAS FLOW.O2 O2 DELIVERY SYS: ABNORMAL L/MIN
GLOBULIN SER CALC-MCNC: 3.2 G/DL (ref 2.3–3.5)
GLUCOSE SERPL-MCNC: 169 MG/DL (ref 65–100)
HCO3 BLD-SCNC: 21.7 MMOL/L (ref 22–26)
HCT VFR BLD AUTO: 31.5 % (ref 35.8–46.3)
HCT VFR BLD AUTO: 32.7 % (ref 35.8–46.3)
HGB BLD-MCNC: 10.2 G/DL (ref 11.7–15.4)
HGB BLD-MCNC: 9.9 G/DL (ref 11.7–15.4)
IMM GRANULOCYTES # BLD AUTO: 0.1 K/UL (ref 0–0.5)
IMM GRANULOCYTES # BLD AUTO: 0.3 K/UL (ref 0–0.5)
IMM GRANULOCYTES NFR BLD AUTO: 1 % (ref 0–5)
IMM GRANULOCYTES NFR BLD AUTO: 2 % (ref 0–5)
LYMPHOCYTES # BLD: 1.4 K/UL (ref 0.5–4.6)
LYMPHOCYTES # BLD: 1.7 K/UL (ref 0.5–4.6)
LYMPHOCYTES NFR BLD: 13 % (ref 13–44)
LYMPHOCYTES NFR BLD: 19 % (ref 13–44)
MCH RBC QN AUTO: 24.8 PG (ref 26.1–32.9)
MCH RBC QN AUTO: 25 PG (ref 26.1–32.9)
MCHC RBC AUTO-ENTMCNC: 31.2 G/DL (ref 31.4–35)
MCHC RBC AUTO-ENTMCNC: 31.4 G/DL (ref 31.4–35)
MCV RBC AUTO: 79.4 FL (ref 79.6–97.8)
MCV RBC AUTO: 79.5 FL (ref 79.6–97.8)
MONOCYTES # BLD: 0.2 K/UL (ref 0.1–1.3)
MONOCYTES # BLD: 0.2 K/UL (ref 0.1–1.3)
MONOCYTES NFR BLD: 2 % (ref 4–12)
MONOCYTES NFR BLD: 2 % (ref 4–12)
NEUTS SEG # BLD: 6.9 K/UL (ref 1.7–8.2)
NEUTS SEG # BLD: 8.8 K/UL (ref 1.7–8.2)
NEUTS SEG NFR BLD: 77 % (ref 43–78)
NEUTS SEG NFR BLD: 82 % (ref 43–78)
NRBC # BLD: 0.08 K/UL (ref 0–0.2)
NRBC # BLD: 0.18 K/UL (ref 0–0.2)
O2/TOTAL GAS SETTING VFR VENT: 48 %
PCO2 BLD: 33.2 MMHG (ref 35–45)
PH BLD: 7.42 [PH] (ref 7.35–7.45)
PLATELET # BLD AUTO: 201 K/UL (ref 150–450)
PLATELET # BLD AUTO: 210 K/UL (ref 150–450)
PMV BLD AUTO: 9.6 FL (ref 9.4–12.3)
PMV BLD AUTO: 9.7 FL (ref 9.4–12.3)
PO2 BLD: 63 MMHG (ref 75–100)
POTASSIUM SERPL-SCNC: 3.5 MMOL/L (ref 3.5–5.1)
PROT SERPL-MCNC: 5.1 G/DL (ref 6.3–8.2)
RBC # BLD AUTO: 3.96 M/UL (ref 4.05–5.2)
RBC # BLD AUTO: 4.12 M/UL (ref 4.05–5.2)
SAO2 % BLD: 92 % (ref 95–98)
SERVICE CMNT-IMP: ABNORMAL
SODIUM SERPL-SCNC: 136 MMOL/L (ref 136–145)
SPECIMEN TYPE: ABNORMAL
WBC # BLD AUTO: 10.8 K/UL (ref 4.3–11.1)
WBC # BLD AUTO: 8.9 K/UL (ref 4.3–11.1)

## 2021-01-10 PROCEDURE — 74011250637 HC RX REV CODE- 250/637: Performed by: INTERNAL MEDICINE

## 2021-01-10 PROCEDURE — 74011000258 HC RX REV CODE- 258: Performed by: INTERNAL MEDICINE

## 2021-01-10 PROCEDURE — 87635 SARS-COV-2 COVID-19 AMP PRB: CPT

## 2021-01-10 PROCEDURE — 85379 FIBRIN DEGRADATION QUANT: CPT

## 2021-01-10 PROCEDURE — 83880 ASSAY OF NATRIURETIC PEPTIDE: CPT

## 2021-01-10 PROCEDURE — 74011250637 HC RX REV CODE- 250/637: Performed by: OBSTETRICS & GYNECOLOGY

## 2021-01-10 PROCEDURE — 36415 COLL VENOUS BLD VENIPUNCTURE: CPT

## 2021-01-10 PROCEDURE — 36600 WITHDRAWAL OF ARTERIAL BLOOD: CPT

## 2021-01-10 PROCEDURE — 77010033711 HC HIGH FLOW OXYGEN

## 2021-01-10 PROCEDURE — 82803 BLOOD GASES ANY COMBINATION: CPT

## 2021-01-10 PROCEDURE — 74011250636 HC RX REV CODE- 250/636: Performed by: ANESTHESIOLOGY

## 2021-01-10 PROCEDURE — 99233 SBSQ HOSP IP/OBS HIGH 50: CPT | Performed by: OBSTETRICS & GYNECOLOGY

## 2021-01-10 PROCEDURE — 80053 COMPREHEN METABOLIC PANEL: CPT

## 2021-01-10 PROCEDURE — 65270000029 HC RM PRIVATE

## 2021-01-10 PROCEDURE — 85025 COMPLETE CBC W/AUTO DIFF WBC: CPT

## 2021-01-10 PROCEDURE — 77010033678 HC OXYGEN DAILY

## 2021-01-10 PROCEDURE — 93005 ELECTROCARDIOGRAM TRACING: CPT | Performed by: INTERNAL MEDICINE

## 2021-01-10 PROCEDURE — 77030012341 HC CHMB SPCR OPTC MDI VYRM -A

## 2021-01-10 PROCEDURE — 74011000250 HC RX REV CODE- 250: Performed by: HOSPITALIST

## 2021-01-10 PROCEDURE — 94760 N-INVAS EAR/PLS OXIMETRY 1: CPT

## 2021-01-10 PROCEDURE — 74011250636 HC RX REV CODE- 250/636: Performed by: INTERNAL MEDICINE

## 2021-01-10 RX ORDER — POLYETHYLENE GLYCOL 3350 17 G/17G
17 POWDER, FOR SOLUTION ORAL DAILY
Status: CANCELLED | OUTPATIENT
Start: 2021-01-11

## 2021-01-10 RX ORDER — SODIUM CHLORIDE, SODIUM LACTATE, POTASSIUM CHLORIDE, CALCIUM CHLORIDE 600; 310; 30; 20 MG/100ML; MG/100ML; MG/100ML; MG/100ML
125 INJECTION, SOLUTION INTRAVENOUS CONTINUOUS
Status: CANCELLED | OUTPATIENT
Start: 2021-01-10

## 2021-01-10 RX ORDER — SIMETHICONE 80 MG
80 TABLET,CHEWABLE ORAL
Status: DISCONTINUED | OUTPATIENT
Start: 2021-01-10 | End: 2021-01-14

## 2021-01-10 RX ORDER — ASCORBIC ACID 500 MG
1000 TABLET ORAL 3 TIMES DAILY
Status: CANCELLED | OUTPATIENT
Start: 2021-01-10

## 2021-01-10 RX ORDER — METOPROLOL TARTRATE 5 MG/5ML
5 INJECTION INTRAVENOUS
Status: DISCONTINUED | OUTPATIENT
Start: 2021-01-10 | End: 2021-01-16 | Stop reason: HOSPADM

## 2021-01-10 RX ORDER — HYDROMORPHONE HYDROCHLORIDE 1 MG/ML
1 INJECTION, SOLUTION INTRAMUSCULAR; INTRAVENOUS; SUBCUTANEOUS
Status: CANCELLED | OUTPATIENT
Start: 2021-01-10 | End: 2021-01-10

## 2021-01-10 RX ORDER — IBUPROFEN 800 MG/1
800 TABLET ORAL
Status: DISCONTINUED | OUTPATIENT
Start: 2021-01-10 | End: 2021-01-16 | Stop reason: HOSPADM

## 2021-01-10 RX ORDER — ACETAMINOPHEN 500 MG
1000 TABLET ORAL
Status: COMPLETED | OUTPATIENT
Start: 2021-01-10 | End: 2021-01-12

## 2021-01-10 RX ORDER — AZITHROMYCIN 250 MG/1
500 TABLET, FILM COATED ORAL DAILY
Status: CANCELLED | OUTPATIENT
Start: 2021-01-10 | End: 2021-01-15

## 2021-01-10 RX ORDER — DEXAMETHASONE SODIUM PHOSPHATE 100 MG/10ML
6 INJECTION INTRAMUSCULAR; INTRAVENOUS EVERY 24 HOURS
Status: DISCONTINUED | OUTPATIENT
Start: 2021-01-11 | End: 2021-01-12

## 2021-01-10 RX ORDER — METOPROLOL TARTRATE 50 MG/1
50 TABLET ORAL ONCE
Status: DISCONTINUED | OUTPATIENT
Start: 2021-01-10 | End: 2021-01-10

## 2021-01-10 RX ORDER — SIMETHICONE 80 MG
80 TABLET,CHEWABLE ORAL
Status: CANCELLED | OUTPATIENT
Start: 2021-01-10

## 2021-01-10 RX ORDER — POLYETHYLENE GLYCOL 3350 17 G/17G
17 POWDER, FOR SOLUTION ORAL DAILY
Status: DISCONTINUED | OUTPATIENT
Start: 2021-01-11 | End: 2021-01-14

## 2021-01-10 RX ORDER — ONDANSETRON 2 MG/ML
4 INJECTION INTRAMUSCULAR; INTRAVENOUS
Status: CANCELLED | OUTPATIENT
Start: 2021-01-10 | End: 2021-01-10

## 2021-01-10 RX ORDER — ALBUTEROL SULFATE 90 UG/1
2 AEROSOL, METERED RESPIRATORY (INHALATION)
Status: DISCONTINUED | OUTPATIENT
Start: 2021-01-10 | End: 2021-01-12

## 2021-01-10 RX ORDER — AZITHROMYCIN 250 MG/1
500 TABLET, FILM COATED ORAL DAILY
Status: DISCONTINUED | OUTPATIENT
Start: 2021-01-10 | End: 2021-01-10

## 2021-01-10 RX ORDER — NIFEDIPINE 10 MG/1
20 CAPSULE ORAL EVERY 6 HOURS
Status: CANCELLED | OUTPATIENT
Start: 2021-01-10

## 2021-01-10 RX ORDER — ACETAMINOPHEN 500 MG
1000 TABLET ORAL
Status: CANCELLED | OUTPATIENT
Start: 2021-01-10

## 2021-01-10 RX ORDER — ACETAMINOPHEN 500 MG
1000 TABLET ORAL
Status: DISCONTINUED | OUTPATIENT
Start: 2021-01-10 | End: 2021-01-10 | Stop reason: SDUPTHER

## 2021-01-10 RX ORDER — DIPHENHYDRAMINE HYDROCHLORIDE 50 MG/ML
12.5 INJECTION, SOLUTION INTRAMUSCULAR; INTRAVENOUS
Status: DISCONTINUED | OUTPATIENT
Start: 2021-01-10 | End: 2021-01-16 | Stop reason: HOSPADM

## 2021-01-10 RX ORDER — IBUPROFEN 800 MG/1
800 TABLET ORAL
Status: CANCELLED | OUTPATIENT
Start: 2021-01-10

## 2021-01-10 RX ORDER — KETOROLAC TROMETHAMINE 30 MG/ML
30 INJECTION, SOLUTION INTRAMUSCULAR; INTRAVENOUS
Status: DISCONTINUED | OUTPATIENT
Start: 2021-01-10 | End: 2021-01-10

## 2021-01-10 RX ORDER — UREA 10 %
220 LOTION (ML) TOPICAL DAILY
Status: CANCELLED | OUTPATIENT
Start: 2021-01-11

## 2021-01-10 RX ORDER — ASCORBIC ACID 500 MG
1000 TABLET ORAL 3 TIMES DAILY
Status: DISCONTINUED | OUTPATIENT
Start: 2021-01-10 | End: 2021-01-16 | Stop reason: HOSPADM

## 2021-01-10 RX ORDER — HYDROMORPHONE HYDROCHLORIDE 1 MG/ML
1 INJECTION, SOLUTION INTRAMUSCULAR; INTRAVENOUS; SUBCUTANEOUS
Status: ACTIVE | OUTPATIENT
Start: 2021-01-10 | End: 2021-01-10

## 2021-01-10 RX ORDER — KETOROLAC TROMETHAMINE 30 MG/ML
30 INJECTION, SOLUTION INTRAMUSCULAR; INTRAVENOUS
Status: CANCELLED | OUTPATIENT
Start: 2021-01-10 | End: 2021-01-10

## 2021-01-10 RX ORDER — DIPHENHYDRAMINE HYDROCHLORIDE 50 MG/ML
12.5 INJECTION, SOLUTION INTRAMUSCULAR; INTRAVENOUS
Status: CANCELLED | OUTPATIENT
Start: 2021-01-10

## 2021-01-10 RX ORDER — OXYCODONE HYDROCHLORIDE 5 MG/1
10 TABLET ORAL
Status: CANCELLED | OUTPATIENT
Start: 2021-01-10 | End: 2021-01-10

## 2021-01-10 RX ORDER — AZITHROMYCIN 250 MG/1
500 TABLET, FILM COATED ORAL DAILY
Status: DISCONTINUED | OUTPATIENT
Start: 2021-01-11 | End: 2021-01-12

## 2021-01-10 RX ORDER — ONDANSETRON 2 MG/ML
4 INJECTION INTRAMUSCULAR; INTRAVENOUS
Status: ACTIVE | OUTPATIENT
Start: 2021-01-10 | End: 2021-01-10

## 2021-01-10 RX ORDER — SODIUM CHLORIDE 9 MG/ML
250 INJECTION, SOLUTION INTRAVENOUS AS NEEDED
Status: DISCONTINUED | OUTPATIENT
Start: 2021-01-10 | End: 2021-01-16 | Stop reason: HOSPADM

## 2021-01-10 RX ORDER — ALBUTEROL SULFATE 90 UG/1
2 AEROSOL, METERED RESPIRATORY (INHALATION)
Status: CANCELLED | OUTPATIENT
Start: 2021-01-10

## 2021-01-10 RX ORDER — NIFEDIPINE 10 MG/1
20 CAPSULE ORAL EVERY 6 HOURS
Status: DISCONTINUED | OUTPATIENT
Start: 2021-01-10 | End: 2021-01-16 | Stop reason: HOSPADM

## 2021-01-10 RX ORDER — NALOXONE HYDROCHLORIDE 0.4 MG/ML
0.2 INJECTION, SOLUTION INTRAMUSCULAR; INTRAVENOUS; SUBCUTANEOUS
Status: CANCELLED | OUTPATIENT
Start: 2021-01-10 | End: 2021-01-11

## 2021-01-10 RX ORDER — UREA 10 %
220 LOTION (ML) TOPICAL DAILY
Status: DISCONTINUED | OUTPATIENT
Start: 2021-01-11 | End: 2021-01-16 | Stop reason: HOSPADM

## 2021-01-10 RX ORDER — SODIUM CHLORIDE 9 MG/ML
250 INJECTION, SOLUTION INTRAVENOUS AS NEEDED
Status: CANCELLED | OUTPATIENT
Start: 2021-01-10

## 2021-01-10 RX ORDER — DEXAMETHASONE SODIUM PHOSPHATE 100 MG/10ML
6 INJECTION INTRAMUSCULAR; INTRAVENOUS EVERY 24 HOURS
Status: CANCELLED | OUTPATIENT
Start: 2021-01-10 | End: 2021-01-20

## 2021-01-10 RX ORDER — DEXAMETHASONE SODIUM PHOSPHATE 100 MG/10ML
6 INJECTION INTRAMUSCULAR; INTRAVENOUS EVERY 24 HOURS
Status: DISCONTINUED | OUTPATIENT
Start: 2021-01-10 | End: 2021-01-10

## 2021-01-10 RX ORDER — DOCUSATE SODIUM 100 MG/1
100 CAPSULE, LIQUID FILLED ORAL 2 TIMES DAILY
Status: CANCELLED | OUTPATIENT
Start: 2021-01-10

## 2021-01-10 RX ORDER — OXYCODONE HYDROCHLORIDE 5 MG/1
10 TABLET ORAL
Status: ACTIVE | OUTPATIENT
Start: 2021-01-10 | End: 2021-01-10

## 2021-01-10 RX ORDER — NALOXONE HYDROCHLORIDE 0.4 MG/ML
0.2 INJECTION, SOLUTION INTRAMUSCULAR; INTRAVENOUS; SUBCUTANEOUS
Status: ACTIVE | OUTPATIENT
Start: 2021-01-10 | End: 2021-01-11

## 2021-01-10 RX ORDER — NALBUPHINE HYDROCHLORIDE 10 MG/ML
5 INJECTION, SOLUTION INTRAMUSCULAR; INTRAVENOUS; SUBCUTANEOUS
Status: CANCELLED | OUTPATIENT
Start: 2021-01-10 | End: 2021-01-10

## 2021-01-10 RX ORDER — DOCUSATE SODIUM 100 MG/1
100 CAPSULE, LIQUID FILLED ORAL 2 TIMES DAILY
Status: DISCONTINUED | OUTPATIENT
Start: 2021-01-10 | End: 2021-01-14

## 2021-01-10 RX ADMIN — CEFTRIAXONE SODIUM 2 G: 2 INJECTION, POWDER, FOR SOLUTION INTRAMUSCULAR; INTRAVENOUS at 12:25

## 2021-01-10 RX ADMIN — DEXAMETHASONE SODIUM PHOSPHATE 6 MG: 10 INJECTION INTRAMUSCULAR; INTRAVENOUS at 12:13

## 2021-01-10 RX ADMIN — Medication 1000 MG: at 17:19

## 2021-01-10 RX ADMIN — ACETAMINOPHEN 1000 MG: 500 TABLET, FILM COATED ORAL at 20:40

## 2021-01-10 RX ADMIN — SIMETHICONE 80 MG: 80 TABLET, CHEWABLE ORAL at 17:19

## 2021-01-10 RX ADMIN — OXYCODONE HYDROCHLORIDE AND ACETAMINOPHEN 1000 MG: 500 TABLET ORAL at 08:21

## 2021-01-10 RX ADMIN — Medication 1000 MG: at 21:30

## 2021-01-10 RX ADMIN — NIFEDIPINE 20 MG: 10 CAPSULE ORAL at 02:06

## 2021-01-10 RX ADMIN — Medication 220 MG: at 08:21

## 2021-01-10 RX ADMIN — KETOROLAC TROMETHAMINE 30 MG: 30 INJECTION, SOLUTION INTRAMUSCULAR at 06:06

## 2021-01-10 RX ADMIN — SIMETHICONE 80 MG: 80 TABLET, CHEWABLE ORAL at 08:21

## 2021-01-10 RX ADMIN — METOPROLOL TARTRATE 5 MG: 5 INJECTION INTRAVENOUS at 21:02

## 2021-01-10 RX ADMIN — ACETAMINOPHEN 1000 MG: 500 TABLET ORAL at 12:13

## 2021-01-10 RX ADMIN — AZITHROMYCIN MONOHYDRATE 500 MG: 250 TABLET ORAL at 12:13

## 2021-01-10 RX ADMIN — NIFEDIPINE 20 MG: 10 CAPSULE ORAL at 08:21

## 2021-01-10 RX ADMIN — NIFEDIPINE 20 MG: 10 CAPSULE ORAL at 20:04

## 2021-01-10 RX ADMIN — DOCUSATE SODIUM 100 MG: 100 CAPSULE ORAL at 17:19

## 2021-01-10 NOTE — PROGRESS NOTES
01/10/21 1003   Oxygen Therapy   O2 Sat (%) 93 %   Pulse via Oximetry 122 beats per minute   O2 Device Hi flow nasal cannula   O2 Flow Rate (L/min) 9 l/min  (Increased from 6 lpm)   Changed patient to High Flow NC @ 9 lpm .

## 2021-01-10 NOTE — PROGRESS NOTES
TRANSFER - IN REPORT:    Verbal report received from [facility] on Pato King  being received from [place] [facility]/baby unit for routine progression of care      Report consisted of patients Situation, Background, Assessment and   Recommendations(SBAR). Information from the following report(s) SBAR, Procedure Summary, Intake/Output, MAR and Recent Results was reviewed with the receiving nurse. Opportunity for questions and clarification was provided. Assessment completed upon patients arrival to unit and care assumed.

## 2021-01-10 NOTE — PROGRESS NOTES
Admission assessment complete as noted. Patient oriented to room and unit. Plan of care reviewed and patient verbalizes understanding. Questions encouraged and answered. Patent encouraged to call for needs or concerns.        Patient spouse at bedside, notified of covid positive policy that he must stay in room at all times, can not come and go, patient and spouse verbalizes understanding

## 2021-01-10 NOTE — ANESTHESIA POSTPROCEDURE EVALUATION
Procedure(s):   SECTION. spinal    Anesthesia Post Evaluation      Multimodal analgesia: multimodal analgesia used between 6 hours prior to anesthesia start to PACU discharge  Patient location during evaluation: bedside  Patient participation: complete - patient participated  Level of consciousness: awake and alert  Pain management: adequate  Airway patency: patent  Anesthetic complications: no  Cardiovascular status: hemodynamically stable  Respiratory status: spontaneous ventilation  Hydration status: euvolemic  Comments: Patient stable and may discharge at this time. Post anesthesia nausea and vomiting:  none  Final Post Anesthesia Temperature Assessment:  Normothermia (36.0-37.5 degrees C)    Good result with spinal anesthesia, resolving normally.     INITIAL Post-op Vital signs:   Vitals Value Taken Time   /89 21   Temp 37.5 °C (99.5 °F) 21   Pulse 122 21   Resp 44 21   SpO2 97 % 21

## 2021-01-10 NOTE — PROGRESS NOTES
Post-Partum Day Number 1 Progress Note    Patient doing well  without significant complaints. Multiple things have transpired over night and she is tachypneic requiring high flow oxygen at this time. Per medicine, pt is to be transferred downtown for covid therapy. Pain controlled on current medication. Voiding without difficulty, normal lochia. Passing gas. Vitals:    Visit Vitals  /71 (BP 1 Location: Left arm, BP Patient Position: Post activity)   Pulse (!) 136   Temp 98.8 °F (37.1 °C)   Resp 30   Ht 5' 1\" (1.549 m)   Wt 164 lb (74.4 kg)   LMP 05/22/2020   SpO2 97%   Breastfeeding Unknown   BMI 30.99 kg/m²       Vital signs stable, afebrile. Exam:  Patient without distress. Heart rrr  Lungs cta b&s               Abdomen soft, fundus firm at level of umbilicus, nontender. Incision clean, dry and intact. Lower extremities are negative for swelling, cords or tenderness. Lab Results   Component Value Date/Time    ABO Group A 07/20/2020 04:12 PM    Rh (D) Negative 07/20/2020 04:12 PM    ABO/Rh(D) A NEGATIVE 01/07/2021 03:44 AM        Lab Results   Component Value Date/Time    ABO/Rh(D) A NEGATIVE 01/07/2021 03:44 AM    Antibody screen NEG 01/07/2021 03:44 AM    Antibody screen, External negative 07/20/2020    Rubella, External immune 07/20/2020    ABO,Rh A negative 07/20/2020     Lab Results   Component Value Date/Time    HGB 10.2 (L) 01/10/2021 06:14 AM    Hgb, External 12.3 07/20/2020     Baby is rh negative also     Assessment and Plan:  Ppd#1 - anemia - improved or back to baseline with 1 unit of prbcs. 2 - covid - transfer downtown for possible more therapy. Advised pt to consider plasma / antivirals therapy to get better. 3 - preeclampsia - procardia 20mg every 6 hours - will hold for blood pressure less than 120/80.    4 - postop  - doing well from that standpoint. Hopefully will be able to ambulate still. Advised medicine that my service will follow.

## 2021-01-10 NOTE — PROGRESS NOTES
Dr. Janet Altman notified about pt being transferred downtown at 1300 by [facility]. Unable to get SARS repeat test prior to departure. Dr. Janet Altman orders to have SARS repeat test upon admission to [facility].

## 2021-01-10 NOTE — PROGRESS NOTES
Educated pt to use incentive spirometer about 10x every hour while awake. Pt demonstrates understanding.

## 2021-01-10 NOTE — PROGRESS NOTES
ENRIQUE advised by charge RN Winston Herrera that the patient has a mini [place] that [place] the [place] [facility] [place] with her DT for breast milk. Winston Herrera states that [facility] has requested this fridge for the patient to pump and store so the milk can be delivered to [facility] NICU for the baby. SW arranged a Lift to  the fridge at [facility] and deliver it to [facility]. ENRIQUE called the floor at [facility] and spoke with Jeannine Red to advise of the fridge being transported for the patient.  The number for [employer] has been added to the the fridge so the  can call staff to pick it up upon arrival.     SUNIL Hall    [facility]    * [email or web address]

## 2021-01-10 NOTE — PROGRESS NOTES
Report called to Aris Alvarado RN on 6th floor downtown [address] [place] [address]. TRANSFER - OUT REPORT:    Verbal report given to Aris Alvarado RN on Howard Desai  being transferred to [facility] ([place] [address] [place] [address]) for routine progression of care       Report consisted of patients Situation, Background, Assessment and   Recommendations(SBAR). Information from the following report(s) SBAR, Procedure Summary, Intake/Output, MAR and Recent Results was reviewed with the receiving nurse. Opportunity for questions and clarification was provided. [address] [place] [address] ambulance service for transfer.

## 2021-01-10 NOTE — PROGRESS NOTES
Anesthesiology  Follow-up    Post-op Day 1. S/p  via spinal with intraspinal narcotics for post-op pain management. Patient is without complaints. Pain is well controlled. Minimal itching and nausea. No residual sensory or motor deficit. No headache. Site ok. No apparent anesthetic complications.

## 2021-01-10 NOTE — PROGRESS NOTES
Dr. Tiffany Gilliland called for update/clarification. Dr. Tiffany Gilliland made aware of pt lung sounds/breathing and urine output/PO intake and okay to [facility] patient at this time. Okay to leave lopez in until am. Dr. Tiffany Gilliland notified of temp of 100.9 and okay to give motrin 800 mg q6  PRN. Updated on previous call with Dr. Mary Middleton (see note). No further orders received.

## 2021-01-10 NOTE — PROGRESS NOTES
Dr. Rosario Just here to transfer pt to [facility] due to progressive respiratory issues.   New orders in chart see Mar.

## 2021-01-10 NOTE — PROGRESS NOTES
Pt set up with breast pump. Educated on use/parts and how to clean. Parents verbalize/demonstrate understanding.

## 2021-01-10 NOTE — DISCHARGE SUMMARY
Hospitalist Discharge Summary     Admit Date:  2021  2:38 AM   DC note date: 1/10/2021  Name:  Ayden Painter   Age:  37 y.o.  :  1985   MRN:  [id number]   PCP:  Louise Wise MD  Treatment Team: Attending Provider: Windy Sloan MD; Consulting Provider: Ketty Renee MD; Care Manager: Yvrose Mcdonald; Utilization Review: Silvio Iniguez    Problem List for this Hospitalization:  Hospital Problems as of 1/10/2021 Date Reviewed: 2021          Codes Class Noted - Resolved POA     labor in third trimester without delivery ICD-10-CM: O60.03  ICD-9-CM: 644.03  2021 - Present Unknown        Pre-eclampsia superimposed on chronic hypertension ICD-10-CM: O11.9  ICD-9-CM: 642.70, 642.00  2021 - Present Yes        * (Principal) COVID-19 affecting pregnancy in third trimester ICD-10-CM: O98.513, U07.1  ICD-9-CM: 647.63, 079.89  2021 - Present Yes    Overview Signed 2021 10:02 AM by Ariana Castañeda MD     Sx 21  Positive 21  Precautions through 21             Placental insufficiency affecting management of mother in third trimester ICD-10-CM: O36.5130  ICD-9-CM: 656.53  2020 - Present Yes    Overview Signed 2020  3:44 PM by Guillermo Joe RN     SEE HRP OVERVIEW             Chronic hypertension with exacerbation during pregnancy in third trimester ICD-10-CM: O10.913  ICD-9-CM: 642.03  10/19/2020 - Present Yes    Overview Addendum 2021 10:03 AM by Ariana Castañeda MD     ... · 20- normal Pre-E labs P/C ratio-121  · 20 24 hour urine-389  . .. 2021 at [facility]: BP stable today. Denies PreE symptoms. AEDF on right. Pregnancy affected by fetal growth restriction ICD-10-CM: O36.5990  ICD-9-CM: 656.50  2020 - Present Yes    Overview Addendum 2021 10:03 AM by Ariana Castañeda MD     2020 [facility]: Severe symmetric. Normal ANIL for gestation. Not secondary to dating error.    Likely due to placenta overlying numerous fibroids. Cautious prognosis. 10/1/2020 [facility]: Appropriate interval growth, still growth restricted. Normal ANIL  12/28/2020 [facility]: Adequate interval growth; stable umbilical artery Dopplers. EFW 14%             Leiomyoma of uterus affecting pregnancy in third trimester ICD-10-CM: O34.13, D25.9  ICD-9-CM: 654.13, 218.9  7/20/2020 - Present Yes    Overview Addendum 12/28/2020 11:53 AM by Farooq Darling MD     7 Fibroids seen at [facility]. Ranging from 2-6 cm. See High Risk Pregnancy Overview             High-risk pregnancy in third trimester ICD-10-CM: O09.93  ICD-9-CM: V23.9  7/20/2020 - Present Yes    Overview Addendum 1/4/2021  9:51 AM by Iwona Martinez MD     GTT-  Flu-  Tdap-  . ..    12/21/2020 at [facility]: BPP 8/8, ANIL 20cm, Stable elevated dopplers. Home BP log WNL with couple elevations noted 140/90 x2. Most /80s. Will have NST at hosp on Thurs 12/28/2020 at [facility]: Stable FGR, AC 5%, Overall 14%, ANIL 19cm, stable elevated dopplers. PreE labs 12/24 stable. 1/4/2021 at [facility]: BPP 8/8 ANIL 13.4. Elevated dopplers-stable. Reasuring fetal status, no signs of PreE. · Follow up MFM here twice weekly testing-here 1/7/21  · Growth every 2 weeks here 1/11/21  · Continue to work from home, modified rest and increased calories. GBS bacteriuria ICD-10-CM: R82.71  ICD-9-CM: 599.0, 041.02  1/14/2020 - Present Yes              Hospital Course:    Ms. Sharyle Dandy is a 36 yo female with Ebbeve[address] 92 since 1-7-21 admitted to OB s/p CSection on 1-9-21 with delivery of female baby. She has been on room air until 1-10-21. CXR shows bilateral infiltrates. CTA chest no PE though bilateral infiltrates. She desaturated with ambulation to the shower and refused to use her O2. She is now on high flow O2. I have discussed a transfer to [facility] with her and her friend at bedside and she agrees. However, she declines convalescent plasma and remdesivir. She agrees to steroids/ antibiotics.      Due to O2 needs and acute illness wih COVID19 she required downtown transfer. I discussed her care with Dr. Madelin Lr of GYN who will followup along downtown and will investigate the breast feeding implications of plasma and remdesivir. STAT ABG, EKG ordered, as well as decadron, IV antibiotics, vitamin C and zinc.  Additionally, she would like a re swab due to her PCR being negative and rapid positive. Disposition:  transfer to       Activity: Activity as tolerated  Diet: DIET REGULAR  Code Status: Prior    Follow Up Orders:  No orders of the defined types were placed in this encounter. Follow-up Information    None         Discharge meds at bottom of this note. Plan was discussed with patient. All questions answered. Patient was stable at time of discharge. Given instructions to call a physician or return if any concerns. Discharge summary and encounter summary was sent to PCP electronically via \"Comm Mgt\" link in [facility], if possible. Diagnostic Imaging/Tests:   Xr Chest Sngl V    Result Date: 2021  Chest portable CLINICAL INDICATION: Postoperative severe dyspnea after  section, acute fever, about 33 weeks pregnant, Covid-19 positive COMPARISON: Radiography earlier today at 8:55 AM TECHNIQUE: single AP portable view chest at 4:25 PM upright FINDINGS: The lung volumes are normal. The mediastinal and hilar contours are stable. Given slight technical differences there is no significant interval change involving multifocal bilateral mild to moderate infiltrates in keeping with Covid-19. No evidence of pneumothorax, developing effusion or CHF. IMPRESSION: Unchanged appearance of bilateral infiltrates compatible with Covid-19. Xr Chest Sngl V    Result Date: 2021  Portable chest: History: +COVID 33 weeks pregnant. Comparison: 2021 Findings: A single view of the chest was obtained at 8:56 AM. The cardiac and mediastinal silhouette are normal in size and configuration.  There are increasing mild patchy bilateral hazy parenchymal opacities. There are no pleural effusions. The pulmonary vascularity is within normal limits. Impression: Findings most suspicious for viral pneumonitis given the provided history. Xr Chest Sngl V    Result Date: 2021   Portable view of the chest COMPARISON: None. CLINICAL HISTORY: Fever and cough. FINDINGS: Lungs are underinflated. There is increased interstitial prominence. There is no pneumothorax or large pleural effusion. Cardiac mediastinal contour appears within normal limits. Surrounding bones are unremarkable. IMPRESSION: 1. Increased interstitial prominence, nonspecific but may be due to vascular congestion or atypical pneumonia. Ct Chest W Cont    Result Date: 2021  CT Chest with contrast Clinical Indication:  Acute elevated d-dimer, moderate dyspnea after  section, Covid-19 positive, further evaluation of bilateral lung infiltrates Comparison:  Chest radiograph earlier today Technique:  Automated exposure Control was used. Contiguous axial images were obtained from the neck base through the upper abdomen following the uneventful administration of 100 cc Isovue 370 intravenous contrast. Pulmonary embolus protocol was used. Coronal reconstructions were done for further evaluation of vessels. Findings:  Pulmonary arteries are borderline dilated, well-opacified with contrast, demonstrating no filling defect. Aorta is normal caliber with no acute abnormality evident. Heart is mildly enlarged. Trace pericardial fluid is nonspecific and could be physiologic. Trace posterior layering pleural effusion on the right. Small hiatal hernia with mild nonspecific distal esophagus wall thickening. Unremarkable thyroid. Several small mediastinal and hilar lymph nodes are noted but there is no overt thoracic lymphadenopathy. Lung windows demonstrate patent and normal caliber central airways.  Multifocal moderate scattered groundglass and partially consolidative infiltrates involving all lobes bilaterally, similar to recent radiography. No pneumothorax, discrete mass, dense lobar consolidation, bronchiectasis or honeycombing. Limited upper abdominal views demonstrate trace free air that is likely postoperative, no acute abnormality otherwise. Bones demonstrate no acute or aggressive osseous lesion. Impression: 1. No evidence of PE. 2. Bilateral infiltrates in keeping with Covid-19, similar to recent radiography. 3. Trace pericardial effusion and trace left pleural effusion. Duplex Lower Ext Venous Right    Result Date: 2021  Right lower extremity duplex venous doppler exam. INDICATION: Pregnant, fever, right lower extremity pain. Evaluate for DVT. [place] [facility] TECHNIQUE: Gray-scale ultrasound with and without compression and color Doppler evaluation were performed of the deep veins of the right lower extremity from the level of the right common femoral vein to the level of the right popliteal vein. Peroneal and posterior tibial veins also interrogated. FINDINGS: The right common femoral vein, right femoral vein, peroneal, posterior tibial, and right popliteal veins are compressible and opacify with color at color Doppler evaluation. There is no evidence of deep vein thrombosis. IMPRESSION: Negative for DVT. Echocardiogram/EKG results:  No results found for this visit on 21. No results found for this or any previous visit.     Procedures done this admission:  Procedure(s):   SECTION    All Micro Results     Procedure Component Value Units Date/Time    CULTURE, BLOOD [[id number]] Collected: 21    Order Status: Completed Specimen: Blood Updated: 01/10/21 0925     Special Requests: --        LEFT  ARM       Culture result: NO GROWTH AFTER 23 HOURS       CULTURE, BLOOD [[id number]] Collected: 21    Order Status: Completed Specimen: Blood Updated: 01/10/21 0925     Special Requests: --        LEFT  HAND Culture result: NO GROWTH AFTER 23 HOURS       CULTURE, GENITAL GROUP B STREP [[id number]] Collected: 01/07/21 0447    Order Status: Completed Specimen: VAGINAL/RECTAL Updated: 01/09/21 0928     Special Requests: NO SPECIAL REQUESTS        Culture result: SUBCULTURE IN PROGRESS       CULTURE, URINE [[id number]] Collected: 01/07/21 0512    Order Status: Completed Specimen: Cath Urine Updated: 01/09/21 0724     Special Requests: NO SPECIAL REQUESTS        Culture result: NO GROWTH 2 DAYS       GRP B STREP SCRN BY PCR [[id number]]  (Abnormal) Collected: 01/07/21 0447    Order Status: Completed Specimen: VAGINAL/RECTAL Updated: 01/07/21 0644     Special Requests: NO SPECIAL REQUESTS        Culture result:       POSITIVE: GBS target nucleic acid is detected. Presumptive for GBS colonization. RESULTS VERIFIED, PHONED TO AND READ BACK BY  WES [place]OB AT 0532 ON 1/7/21 HA      INFLUENZA A & B AG (RAPID TEST) [[id number]] Collected: 01/07/21 0512    Order Status: Completed Specimen: Nasopharyngeal from Nasal washing Updated: 01/07/21 0530     Influenza A Ag Negative        Comment: NEGATIVE FOR THE PRESENCE OF INFLUENZA A ANTIGEN  INFECTION DUE TO INFLUENZA A CANNOT BE RULED OUT. BECAUSE THE ANTIGEN PRESENT IN THE SAMPLE MAY BE BELOW  THE DETECTION LIMIT OF THE TEST. A NEGATIVE TEST IS PRESUMPTIVE AND IT IS RECOMMENDED THAT THESE RESULTS BE CONFIRMED BY VIRAL CULTURE OR AN FDA-CLEARED INFLUENZA A AND B MOLECULAR ASSAY. Influenza B Ag Negative        Comment: NEGATIVE FOR THE PRESENCE OF INFLUENZA B ANTIGEN  INFECTION DUE TO INFLUENZA B CANNOT BE RULED OUT. BECAUSE THE ANTIGEN PRESENT IN THE SAMPLE MAY BE BELOW  THE DETECTION LIMIT OF THE TEST. A NEGATIVE TEST IS PRESUMPTIVE AND IT IS RECOMMENDED THAT THESE RESULTS BE CONFIRMED BY VIRAL CULTURE OR AN FDA-CLEARED INFLUENZA A AND B MOLECULAR ASSAY.           Source Nasopharyngeal       CULTURE, URINE [[id number]] Collected: 01/07/21 0344    Order Status: Canceled Specimen: Urine from Clean catch           SARS-CoV-2 Lab Results  \"Novel Coronavirus\" Test: No results found for: COV2NT   \"Emergent Disease\" Test: No results found for: EDPR  \"SARS-COV-2\" Test: No results found for: XGCOVT  Rapid Test:   Lab Results   Component Value Date/Time    COVR Detected (AA) 01/07/2021 03:44 AM            Labs: Results:       BMP, Mg, Phos Recent Labs     01/10/21  1049 01/09/21  1638 01/08/21  0619    136 135*   K 3.5 3.9 4.2    107 105   CO2 23 21 16*   AGAP 9 8 14   BUN 8 7 4*   CREA 0.64 0.59* 0.48*   CA 8.0* 8.1* 6.7*   * 148* 175*      CBC Recent Labs     01/10/21  1049 01/10/21  0614 01/09/21  1638 01/09/21  0908   WBC 10.8 8.9  --  10.3   RBC 3.96* 4.12  --  4.07   HGB 9.9* 10.2* 9.5* 10.5*   HCT 31.5* 32.7* 31.1* 33.5*    201  --  296   GRANS 82* 77  --  78   LYMPH 13 19  --  16   EOS 0* 0*  --  0*   MONOS 2* 2*  --  4   BASOS 0 0  --  0   IG 2 1  --  2   ANEU 8.8* 6.9  --  8.0   ABL 1.4 1.7  --  1.6   EILEEN 0.0 0.0  --  0.0   ABM 0.2 0.2  --  0.4   ABB 0.0 0.0  --  0.0   AIG 0.3 0.1  --  0.2      LFT Recent Labs     01/10/21  1049 01/09/21  1638 01/08/21  0619   ALT 22 16 19   AP 82 83 100   TP 5.1* 5.0* 6.1*   ALB 1.9* 2.0* 2.4*   GLOB 3.2 3.0 3.7*   AGRAT 0.6* 0.7* 0.6*      Cardiac Testing No results found for: BNPP, BNP, CPK, RCK1, RCK2, RCK3, RCK4, CKMB, CKNDX, CKND1, TROPT, TROIQ   Coagulation Tests No results found for: PTP, INR, APTT, INREXT   A1c Lab Results   Component Value Date/Time    Hemoglobin A1c 7.5 (H) 01/08/2021 06:19 AM      Lipid Panel No results found for: CHOL, CHOLPOCT, CHOLX, CHLST, CHOLV, 233097, HDL, HDLP, LDL, LDLC, DLDLP, 246089, VLDLC, VLDL, TGLX, TRIGL, TRIGP, TGLPOCT, CHHD, CHHDX   Thyroid Panel Lab Results   Component Value Date/Time    TSH 2.430 08/17/2020 03:09 PM    T4, Free 1.16 08/17/2020 03:09 PM        Most Recent UA Lab Results   Component Value Date/Time    Color YELLOW 01/07/2021 03:44 AM Appearance CLOUDY 01/07/2021 03:44 AM    Specific gravity 1.016 01/07/2021 03:44 AM    pH (UA) 6.5 01/07/2021 03:44 AM    Protein 30 (A) 01/07/2021 03:44 AM    Glucose Negative 01/07/2021 03:44 AM    Ketone >80 (A) 01/07/2021 03:44 AM    Bilirubin Negative 01/07/2021 03:44 AM    Blood TRACE (A) 01/07/2021 03:44 AM    Urobilinogen 0.2 01/07/2021 03:44 AM    Nitrites Negative 01/07/2021 03:44 AM    Leukocyte Esterase LARGE (A) 01/07/2021 03:44 AM    WBC 10-20 01/07/2021 03:44 AM    RBC 0-3 01/07/2021 03:44 AM    Epithelial cells 3-5 01/07/2021 03:44 AM    Bacteria 1+ (H) 01/07/2021 03:44 AM    Mucus 1+ (H) 01/07/2021 03:44 AM    Other observations RESULTS VERIFIED MANUALLY 01/07/2021 03:44 AM        No Known Allergies  Immunization History   Administered Date(s) Administered    Rho(D) Immune Globulin - IM 02/24/2020, 12/09/2020       All Labs from Last 24 Hrs:  Recent Results (from the past 24 hour(s))   BLOOD GAS, CORD BLOOD    Collection Time: 01/09/21  1:48 PM   Result Value Ref Range    Device: ROOM AIR      pH, cord blood (POC) 7.20 7. 15 - 7.38      pCO2 cord blood 56 32 - 68 mmHg    pO2 cord blood 11 mmHg    HCO3 (POC) 21.9 (L) 22 - 26 MMOL/L    sO2 (POC) 8 (L) 95 - 98 %    Base deficit (POC) 7 mmol/L    Allens test (POC) NOT APPLICABLE      Site CORD      Specimen type (POC) ARTERIAL CORD      Performed by Eder     CO2, POC 24 MMOL/L   BLOOD GAS, CORD BLOOD    Collection Time: 01/09/21  1:49 PM   Result Value Ref Range    Device: ROOM AIR      pH, cord blood (POC) 7.24 7. 15 - 7.38      pCO2 cord blood 47 32 - 68 mmHg    pO2 cord blood 18 mmHg    HCO3, venous (POC) 20.4 (L) 23 - 28 MMOL/L    sO2, venous (POC) 21 (L) 65 - 88 %    Base deficit (POC) 7 mmol/L    Allens test (POC) NOT APPLICABLE      Site CORD      Specimen type (POC) VENOUS CORD      Performed by Eder FROST, POC 22 MMOL/L   D DIMER    Collection Time: 01/09/21  2:59 PM   Result Value Ref Range    D DIMER >20.00 (H) <0.56 ug/ml(FEU)   RBC, ALLOCATE    Collection Time: 01/09/21  3:30 PM   Result Value Ref Range    HISTORY CHECKED? Historical check performed    HGB & HCT    Collection Time: 01/09/21  4:38 PM   Result Value Ref Range    HGB 9.5 (L) 11.7 - 15.4 g/dL    HCT 31.1 (L) 35.8 - 46.3 %   D DIMER    Collection Time: 01/09/21  4:38 PM   Result Value Ref Range    D DIMER 19.88 (H) <0.56 ug/ml(FEU)   METABOLIC PANEL, COMPREHENSIVE    Collection Time: 01/09/21  4:38 PM   Result Value Ref Range    Sodium 136 136 - 145 mmol/L    Potassium 3.9 3.5 - 5.1 mmol/L    Chloride 107 98 - 107 mmol/L    CO2 21 21 - 32 mmol/L    Anion gap 8 7 - 16 mmol/L    Glucose 148 (H) 65 - 100 mg/dL    BUN 7 6 - 23 MG/DL    Creatinine 0.59 (L) 0.6 - 1.0 MG/DL    GFR est AA >60 >60 ml/min/1.73m2    GFR est non-AA >60 >60 ml/min/1.73m2    Calcium 8.1 (L) 8.3 - 10.4 MG/DL    Bilirubin, total 0.3 0.2 - 1.1 MG/DL    ALT (SGPT) 16 12 - 65 U/L    AST (SGOT) 24 15 - 37 U/L    Alk. phosphatase 83 50 - 136 U/L    Protein, total 5.0 (L) 6.3 - 8.2 g/dL    Albumin 2.0 (L) 3.5 - 5.0 g/dL    Globulin 3.0 2.3 - 3.5 g/dL    A-G Ratio 0.7 (L) 1.2 - 3.5     CBC WITH AUTOMATED DIFF    Collection Time: 01/10/21  6:14 AM   Result Value Ref Range    WBC 8.9 4.3 - 11.1 K/uL    RBC 4.12 4.05 - 5.2 M/uL    HGB 10.2 (L) 11.7 - 15.4 g/dL    HCT 32.7 (L) 35.8 - 46.3 %    MCV 79.4 (L) 79.6 - 97.8 FL    MCH 24.8 (L) 26.1 - 32.9 PG    MCHC 31.2 (L) 31.4 - 35.0 g/dL    RDW 20.1 (H) 11.9 - 14.6 %    PLATELET 644 257 - 255 K/uL    MPV 9.6 9.4 - 12.3 FL    ABSOLUTE NRBC 0.08 0.0 - 0.2 K/uL    DF AUTOMATED      NEUTROPHILS 77 43 - 78 %    LYMPHOCYTES 19 13 - 44 %    MONOCYTES 2 (L) 4.0 - 12.0 %    EOSINOPHILS 0 (L) 0.5 - 7.8 %    BASOPHILS 0 0.0 - 2.0 %    IMMATURE GRANULOCYTES 1 0.0 - 5.0 %    ABS. NEUTROPHILS 6.9 1.7 - 8.2 K/UL    ABS. LYMPHOCYTES 1.7 0.5 - 4.6 K/UL    ABS. MONOCYTES 0.2 0.1 - 1.3 K/UL    ABS. EOSINOPHILS 0.0 0.0 - 0.8 K/UL    ABS. BASOPHILS 0.0 0.0 - 0.2 K/UL    ABS. IMM. GRANS. 0.1 0.0 - 0.5 K/UL   D DIMER    Collection Time: 01/10/21 10:49 AM   Result Value Ref Range    D DIMER 3.48 (H) <0.56 ug/ml(FEU)   CBC WITH AUTOMATED DIFF    Collection Time: 01/10/21 10:49 AM   Result Value Ref Range    WBC 10.8 4.3 - 11.1 K/uL    RBC 3.96 (L) 4.05 - 5.2 M/uL    HGB 9.9 (L) 11.7 - 15.4 g/dL    HCT 31.5 (L) 35.8 - 46.3 %    MCV 79.5 (L) 79.6 - 97.8 FL    MCH 25.0 (L) 26.1 - 32.9 PG    MCHC 31.4 31.4 - 35.0 g/dL    RDW 20.1 (H) 11.9 - 14.6 %    PLATELET 206 242 - 959 K/uL    MPV 9.7 9.4 - 12.3 FL    ABSOLUTE NRBC 0.18 0.0 - 0.2 K/uL    DF AUTOMATED      NEUTROPHILS 82 (H) 43 - 78 %    LYMPHOCYTES 13 13 - 44 %    MONOCYTES 2 (L) 4.0 - 12.0 %    EOSINOPHILS 0 (L) 0.5 - 7.8 %    BASOPHILS 0 0.0 - 2.0 %    IMMATURE GRANULOCYTES 2 0.0 - 5.0 %    ABS. NEUTROPHILS 8.8 (H) 1.7 - 8.2 K/UL    ABS. LYMPHOCYTES 1.4 0.5 - 4.6 K/UL    ABS. MONOCYTES 0.2 0.1 - 1.3 K/UL    ABS. EOSINOPHILS 0.0 0.0 - 0.8 K/UL    ABS. BASOPHILS 0.0 0.0 - 0.2 K/UL    ABS. IMM. GRANS. 0.3 0.0 - 0.5 K/UL   METABOLIC PANEL, COMPREHENSIVE    Collection Time: 01/10/21 10:49 AM   Result Value Ref Range    Sodium 136 136 - 145 mmol/L    Potassium 3.5 3.5 - 5.1 mmol/L    Chloride 104 98 - 107 mmol/L    CO2 23 21 - 32 mmol/L    Anion gap 9 7 - 16 mmol/L    Glucose 169 (H) 65 - 100 mg/dL    BUN 8 6 - 23 MG/DL    Creatinine 0.64 0.6 - 1.0 MG/DL    GFR est AA >60 >60 ml/min/1.73m2    GFR est non-AA >60 >60 ml/min/1.73m2    Calcium 8.0 (L) 8.3 - 10.4 MG/DL    Bilirubin, total 0.4 0.2 - 1.1 MG/DL    ALT (SGPT) 22 12 - 65 U/L    AST (SGOT) 48 (H) 15 - 37 U/L    Alk.  phosphatase 82 50 - 130 U/L    Protein, total 5.1 (L) 6.3 - 8.2 g/dL    Albumin 1.9 (L) 3.5 - 5.0 g/dL    Globulin 3.2 2.3 - 3.5 g/dL    A-G Ratio 0.6 (L) 1.2 - 3.5     NT-PRO BNP    Collection Time: 01/10/21 10:49 AM   Result Value Ref Range    NT pro-BNP 59 5 - 125 PG/ML       Discharge Exam:  Patient Vitals for the past 24 hrs:   Temp Pulse Resp BP SpO2   01/10/21 1215 99.6 °F (37.6 °C) (!) 130 (!) 42 (!) 145/80 94 %   01/10/21 1116 (!) 101.7 °F (38.7 °C) (!) 126 (!) 39 136/86 97 %   01/10/21 1033     97 %   01/10/21 1003     93 %   01/10/21 0935     90 %   01/10/21 0928   30  90 %   01/10/21 0927     (!) 88 %   01/10/21 0921     (!) 87 %   01/10/21 0919  (!) 136  125/71 (!) 80 %   01/10/21 0821  (!) 110  (!) 143/93    01/10/21 0724 98.8 °F (37.1 °C) (!) 106 (!) 34 129/83 95 %   01/10/21 0550 98.1 °F (36.7 °C) (!) 107 28 137/76 98 %   01/10/21 0206 98.7 °F (37.1 °C) 95 30 125/85 97 %   01/09/21 2226 (!) 100.9 °F (38.3 °C) (!) 114 26 132/87 99 %   01/09/21 2145 99.6 °F (37.6 °C) (!) 117 28 130/80 99 %   01/09/21 2105 99.1 °F (37.3 °C) (!) 128 (!) 35 108/66 96 %   01/09/21 2015 98.7 °F (37.1 °C) (!) 114 (!) 36 (!) 148/98 93 %   01/09/21 2004 99.5 °F (37.5 °C) (!) 122 (!) 44 131/89 97 %   01/09/21 1925 99.4 °F (37.4 °C) (!) 122 (!) 37 128/84 93 %   01/09/21 1820     94 %   01/09/21 1748 100.1 °F (37.8 °C) (!) 122  (!) 148/96 93 %   01/09/21 1650 100.4 °F (38 °C)       01/09/21 1644 98.3 °F (36.8 °C) (!) 133 (!) 44 128/89 92 %   01/09/21 1555  (!) 131  (!) 158/100 93 %   01/09/21 1525  (!) 130 (!) 36 (!) 151/90 94 %   01/09/21 1455  (!) 121 (!) 36 138/87 100 %   01/09/21 1440  (!) 125  (!) 135/94 100 %   01/09/21 1424 98.3 °F (36.8 °C) (!) 118 (!) 32 138/78 100 %   01/09/21 1410  (!) 122  109/66 100 %   01/09/21 1404  (!) 119 24 (!) 86/54 100 %   01/09/21 1358 98 °F (36.7 °C) (!) 114 20 (!) 96/50 100 %     Oxygen Therapy  O2 Sat (%): 94 % (01/10/21 1215)  Pulse via Oximetry: 125 beats per minute (01/10/21 1033)  O2 Device: Hi flow nasal cannula (01/10/21 1003)  O2 Flow Rate (L/min): 9 l/min(Increased from 6 lpm) (01/10/21 1003)    Estimated body mass index is 30.99 kg/m² as calculated from the following:    Height as of this encounter: 5' 1\" (1.549 m). Weight as of this encounter: 74.4 kg (164 lb).     Intake/Output Summary (Last 24 hours) at 1/10/2021 1227  Last data filed at 1/10/2021 0850  Gross per 24 hour   Intake 1815.8 ml   Output 3680 ml   Net -1864.2 ml       *Note that automatically entered I/Os may not be accurate; dependent on patient compliance with collection and accurate  by assistants. General:    Well nourished. No overt distress, alert   CV:   Regular rate and rhythm. No m/r/g. No edema. Lungs:  CTAB. No wheezing, rhonchi, or rales. Unlabored anterior   Abdomen: Soft, nontender, nondistended. Decreased BS  Extremities: Warm and dry  Neurologic: grossly intact. No gross focal deficits. Alert. Skin:     No rashes. No jaundice. Psych:  Normal mood and affect.     Current Med List in Hospital:   Current Facility-Administered Medications   Medication Dose Route Frequency    dexamethasone (DECADRON) 10 mg/mL injection 6 mg  6 mg IntraVENous Q24H    azithromycin (ZITHROMAX) tablet 500 mg  500 mg Oral DAILY    cefTRIAXone (ROCEPHIN) 2 g in 0.9% sodium chloride (MBP/ADV) 50 mL MBP  2 g IntraVENous Q24H    lactated Ringers infusion  125 mL/hr IntraVENous CONTINUOUS    acetaminophen (TYLENOL) tablet 1,000 mg  1,000 mg Oral Q6H PRN    ketorolac (TORADOL) injection 30 mg  30 mg IntraVENous Q6H PRN    oxyCODONE IR (ROXICODONE) tablet 10 mg  10 mg Oral Q6H PRN    HYDROmorphone (PF) (DILAUDID) injection 1 mg  1 mg IntraVENous Q1H PRN    naloxone (NARCAN) injection 0.2 mg  0.2 mg IntraVENous ONCE PRN    ondansetron (ZOFRAN) injection 4 mg  4 mg IntraVENous Q6H PRN    nalbuphine (NUBAIN) injection 5 mg  5 mg IntraVENous Q6H PRN    diphenhydrAMINE (BENADRYL) injection 12.5 mg  12.5 mg IntraVENous Q6H PRN    promethazine (PHENERGAN) with saline injection 6.25 mg  6.25 mg IntraVENous Q6H PRN    acetaminophen (TYLENOL) tablet 1,000 mg  1,000 mg Oral Q6H PRN    lactated Ringers infusion  125 mL/hr IntraVENous CONTINUOUS    simethicone (MYLICON) tablet 80 mg  80 mg Oral AC&HS    0.9% sodium chloride infusion 250 mL  250 mL IntraVENous PRN    albuterol (PROVENTIL HFA, VENTOLIN HFA, PROAIR HFA) inhaler 2 Puff  2 Puff Inhalation Q6H RT    NIFEdipine (PROCARDIA) capsule 20 mg  20 mg Oral Q6H    zinc sulfate tablet 220 mg  220 mg Oral DAILY    ascorbic acid (vitamin C) (VITAMIN C) tablet 1,000 mg  1,000 mg Oral TID    docusate sodium (COLACE) capsule 100 mg  100 mg Oral BID    polyethylene glycol (MIRALAX) packet 17 g  17 g Oral DAILY    ibuprofen (MOTRIN) tablet 800 mg  800 mg Oral Q6H PRN       Discharge Info:   Current Discharge Medication List            Time spent in patient discharge planning and coordination 45 minutes.     Signed:  Priya Moran MD

## 2021-01-10 NOTE — PROGRESS NOTES
Spoke with Dr. Bree Voss regarding orders that have been discontinued, reorder vitamin C, zinc, colace and miralax     Notified of oral temp of 98.3, AX temp of 100.4, give tylenol 1000 mg po now for fever and premedicate for 1 unit of blood     Order received to transfuse 1 unit of PRBC    Also premedicate with benadryl 25 mg IV now      Notified that procardia was discontinued, notified of patient BP, heart rate    Procardia 20 mg po every 6 hours, hold for BP less than 120/80      Read back all orders

## 2021-01-10 NOTE — PROGRESS NOTES
Dr Bucky Chang notified of pt request to ambulate and shower. Order received to HERMINIA lopez, pt may shower and ambulate.

## 2021-01-10 NOTE — ROUTINE PROCESS
SBAR IN Report: Mother Verbal report received from [address] [place] [address] on this patient, who is now being transferred from L&D for routine post - op. The patient is wearing a green \"Anesthesia-Duramorph\" band. Report consisted of patient's Situation, Background, Assessment and Recommendations (SBAR).  ID bands were compared with the identification form, and verified with the patient and transferring nurse. Information from the SBAR, Kardex, Intake/Output, MAR and Recent Results and the [place] Report was reviewed with the transferring nurse; opportunity for questions and clarification provided.

## 2021-01-10 NOTE — PROGRESS NOTES
Dr. Marjorie Lai called for clarification on blood admin order. Orders to transfuse only 1 unit PRBC. Updated on pt vital signs and status. Orders for 4L NC throughout night an monitor O2 levels during blood admin. Pt currently resting quietly in bed. Instructed to call for needs or concerns.

## 2021-01-10 NOTE — PROGRESS NOTES
01/10/21 1410   Dual Skin Pressure Injury Assessment   Dual Skin Pressure Injury Assessment WDL   Second Care Provider (Based on [address] [place] [address]) Radha AMADO   Skin Integumentary   Skin Integumentary (WDL) X    Pressure  Injury Documentation No Pressure Injury Noted-Pressure Ulcer Prevention Initiated   Skin Integrity Incision (comment)   Wound Prevention and Protection Methods   Orientation of Wound Prevention Posterior   Location of Wound Prevention Shoulder; Sacrum/Coccyx   Dressing Present  No   Wound Offloading (Prevention Methods) Bed, pressure reduction mattress;Repositioning;Turning   Lower transverse abd open to air clean, dry, and intact with no drainage noted. Marni pad in place with just scant amount of brownish drainage noted. No skin break down noted. Dual skin assessment completed with Sheila Doty. Oriented to room and floor.

## 2021-01-10 NOTE — PROGRESS NOTES
Pt transfer to [facility] via stretcher via [facility] ambulance service.

## 2021-01-10 NOTE — LACTATION NOTE
Noted considering Remdesivir therapy for mom. Very little data with breastfeeding moms, so recommend to proceed with caution, but is not contraindicated. Current trials underway. Noted medication is IV due to poor oral absorption and baby's route would be orally. Noted Neonatologist planning to discuss with mom. Used [facility] [employer] for most up to date info. Medication has been given directly to infants in other disease states. Noted pending transfer [facility] facility,. [facility] Suggest working out best way to store and transport milk to baby in our NCU.

## 2021-01-10 NOTE — PROGRESS NOTES
Dr Michelle Ornelas notified of vitals. MD states to give dose of Tylenol (as ordered) for fever.  Read back

## 2021-01-10 NOTE — PROGRESS NOTES
Unit of PRBC started. Educated patient of s/s of reaction. Pt verbalizes understanding. Educated pt to call. Denies needs or concerns.

## 2021-01-10 NOTE — PROGRESS NOTES
MFMProgress Note POD #1    37 y.o.  at 33w1d by Estimated Date of Delivery: 21. Patient admitted for PreEclampsia with severe HTN, FGR and Covid. C/S done yesterday for worsening respiratory symptoms and fetal Tachycardia. .      She complains of being tired a SOB.  Being transferred to [facility] by Hospitalist.    Vitals:      Patient Vitals for the past 24 hrs:   Temp Pulse Resp BP SpO2   01/10/21 1033     97 %   01/10/21 1003     93 %   01/10/21 0935     90 %   01/10/21 0928   30  90 %   01/10/21 0927     (!) 88 %   01/10/21 0921     (!) 87 %   01/10/21 0919  (!) 136  125/71 (!) 80 %   01/10/21 0821  (!) 110  (!) 143/93    01/10/21 0724 98.8 °F (37.1 °C) (!) 106 (!) 34 129/83 95 %   01/10/21 0550 98.1 °F (36.7 °C) (!) 107 28 137/76 98 %   01/10/21 0206 98.7 °F (37.1 °C) 95 30 125/85 97 %   21 2226 (!) 100.9 °F (38.3 °C) (!) 114 26 132/87 99 %   21 2145 99.6 °F (37.6 °C) (!) 117 28 130/80 99 %   21 2105 99.1 °F (37.3 °C) (!) 128 (!) 35 108/66 96 %   21 98.7 °F (37.1 °C) (!) 114 (!) 36 (!) 148/98 93 %   21 99.5 °F (37.5 °C) (!) 122 (!) 44 131/89 97 %   21 192 99.4 °F (37.4 °C) (!) 122 (!) 37 128/84 93 %   21 1820     94 %   21 1748 100.1 °F (37.8 °C) (!) 122  (!) 148/96 93 %   21 1650 100.4 °F (38 °C)       21 1644 98.3 °F (36.8 °C) (!) 133 (!) 44 128/89 92 %   21 1555  (!) 131  (!) 158/100 93 %   21 1525  (!) 130 (!) 36 (!) 151/90 94 %   21 1455  (!) 121 (!) 36 138/87 100 %   21 1440  (!) 125  (!) 135/94 100 %   21 1424 98.3 °F (36.8 °C) (!) 118 (!) 32 138/78 100 %   21 1410  (!) 122  109/66 100 %   21 1404  (!) 119 24 (!) 86/54 100 %   21 1358 98 °F (36.7 °C) (!) 114 20 (!) 96/50 100 %   21 1156     98 %   21 1151     98 %   21 1146     98 %   21 1141     98 %   21 1136     97 % 21 1128     100 %   21 1123     100 %   21 1117     99 %   21 1112     100 %       I&O:  01/10 07 - 01/10 1900  In: 500 [P.O.:500]  Out: 250 [Urine:250]            1901 - 01/10 0700  In: 1315.8 [I.V.:1000]  Out: 2913 [Urine:1750]        Labs:   CBC:    Recent Labs     01/10/21  1049 01/10/21  0614 21  1638 21  0908 21  0619 21  0345 20  1118 20  1609 20  1350 10/26/20  1036 20  1612 20   WBC 10.8 8.9  --  10.3 8.4 7.1 7.5 9.8 9.4 7.9 7.6  --    HGB 9.9* 10.2* 9.5* 10.5* 10.8* 10.9* 10.8* 12.1 11.6 11.4 12.3  --    HCT 31.5* 32.7* 31.1* 33.5* 35.6* 34.2* 33.6* 38.0 34.6 34.5 36.5  --     201  --  296 302 285 341 426 423 364 390  --    HGBEXT  --   --   --   --   --   --   --   --   --   --   --  12.3   HCTEXT  --   --   --   --   --   --   --   --   --   --   --  36.5   PLTEXT  --   --   --   --   --   --   --   --   --   --   --  390       CMP:   Recent Labs     21  1638 21  0619 21  0344 20  1118 20  1609 20  1350 10/28/20  1508 10/26/20  1036    135* 134* 136 136 135  --  133*   K 3.9 4.2 3.2* 3.1* 4.2 4.0  --  3.9    105 104 104 101 103  --  101   CO2 21 16* 20* 22 21 20  --  19*   AGAP 8 14 10 10  --   --   --   --    * 175* 97 177* 189* 110*  --  156*   BUN 7 4* 4* 6 5* 8  --  6   CREA 0.59* 0.48* 0.57* 0.57* 0.48* 0.44* 0.61 0.53*   GFRAA >60 >60 >60 >60 149 153 138 144   GFRNA >60 >60 >60 >60 129 133 119 125   CA 8.1* 6.7* 8.6 8.3 9.1 9.0  --  9.0   ALB 2.0* 2.4* 2.3* 2.4* 3.4* 3.4*  --  3.4*   TP 5.0* 6.1* 6.7 6.5 6.6 6.2  --  6.4   GLOB 3.0 3.7* 4.4* 4.1*  --   --   --   --    AGRAT 0.7* 0.6* 0.5* 0.6* 1.1* 1.2  --  1.1*   ALT 16 19 23 14 13 11  --  12         Assessment and Plan:    37 y.o.  at 33w1d  with   Covid-19-   Patient continues to have Jacolyn Reels post op, that is not explainable by surgery or PreE/Hypertension.   Treating at Covid related. Medicine transfering to [facility]    Post-Op C/S-Bleeding minimal, Hgb stable after 1 unit trasfusion, no complications. Would not Anticoagulate fully until 72 hours Post-Op, use compression hose or Prophylactic dose    Chronic HTN with Superimposed SeverePreeclampsia-Normal labs today, no indication of HEELP syndrome, BP mildly elevated on  Nifedipine. She may need anti-hypertensive treatment for 2 weeks, possibly longer due to underlying Chronic HTN. Will continue to follow patient and round daily. Please call for any Questions- Dr. HERRERASDTLL-[phone]. Time: 60  Minutes spent on floor,with greater than 50% of the time examining patient, explaining plan and coordinating care with nurse and requesting primary physician.         Annita Weaver MD  1/10/2021

## 2021-01-10 NOTE — PROGRESS NOTES
Pt requesting to have lopez removed and get up OOB later this am. SCDs in place. I/S at bedside. Instructed to call for needs or concerns.

## 2021-01-10 NOTE — PROGRESS NOTES
RN to bedside for assessment. Pt states she feels \"fine\", denies shortness of breath, head ache, vision changes or epigastric pain. Pt requesting to ambulate and shower.  Will call MD.

## 2021-01-11 PROBLEM — J12.82 PNEUMONIA DUE TO COVID-19 VIRUS: Status: ACTIVE | Noted: 2021-01-11

## 2021-01-11 PROBLEM — R06.02 SOB (SHORTNESS OF BREATH): Status: ACTIVE | Noted: 2021-01-11

## 2021-01-11 PROBLEM — U07.1 PNEUMONIA DUE TO COVID-19 VIRUS: Status: ACTIVE | Noted: 2021-01-11

## 2021-01-11 PROBLEM — Z98.891 H/O CESAREAN SECTION: Status: ACTIVE | Noted: 2021-01-11

## 2021-01-11 PROBLEM — R00.0 TACHYCARDIA: Status: ACTIVE | Noted: 2021-01-11

## 2021-01-11 LAB
ABO + RH BLD: NORMAL
ALBUMIN SERPL-MCNC: 2 G/DL (ref 3.5–5)
ALBUMIN/GLOB SERPL: 0.6 {RATIO} (ref 1.2–3.5)
ALP SERPL-CCNC: 107 U/L (ref 50–136)
ALT SERPL-CCNC: 24 U/L (ref 12–65)
ANION GAP SERPL CALC-SCNC: 7 MMOL/L (ref 7–16)
AST SERPL-CCNC: 47 U/L (ref 15–37)
ATRIAL RATE: 124 BPM
ATRIAL RATE: 126 BPM
BACTERIA SPEC CULT: ABNORMAL
BACTERIA SPEC CULT: ABNORMAL
BASOPHILS # BLD: 0 K/UL (ref 0–0.2)
BASOPHILS NFR BLD: 0 % (ref 0–2)
BILIRUB SERPL-MCNC: 0.4 MG/DL (ref 0.2–1.1)
BLD PROD TYP BPU: NORMAL
BLD PROD TYP BPU: NORMAL
BLOOD GROUP ANTIBODIES SERPL: NORMAL
BPU ID: NORMAL
BPU ID: NORMAL
BUN SERPL-MCNC: 8 MG/DL (ref 6–23)
CALCIUM SERPL-MCNC: 8.2 MG/DL (ref 8.3–10.4)
CALCULATED P AXIS, ECG09: 37 DEGREES
CALCULATED P AXIS, ECG09: 53 DEGREES
CALCULATED R AXIS, ECG10: 45 DEGREES
CALCULATED R AXIS, ECG10: 86 DEGREES
CALCULATED T AXIS, ECG11: 24 DEGREES
CALCULATED T AXIS, ECG11: 9 DEGREES
CHLORIDE SERPL-SCNC: 106 MMOL/L (ref 98–107)
CO2 SERPL-SCNC: 26 MMOL/L (ref 21–32)
COVID-19 RAPID TEST, COVR: DETECTED
CREAT SERPL-MCNC: 0.46 MG/DL (ref 0.6–1)
CROSSMATCH RESULT,%XM: NORMAL
CROSSMATCH RESULT,%XM: NORMAL
D DIMER PPP FEU-MCNC: 2.7 UG/ML(FEU)
DIAGNOSIS, 93000: NORMAL
DIAGNOSIS, 93000: NORMAL
DIFFERENTIAL METHOD BLD: ABNORMAL
EOSINOPHIL # BLD: 0 K/UL (ref 0–0.8)
EOSINOPHIL NFR BLD: 0 % (ref 0.5–7.8)
ERYTHROCYTE [DISTWIDTH] IN BLOOD BY AUTOMATED COUNT: 20.2 % (ref 11.9–14.6)
GLOBULIN SER CALC-MCNC: 3.2 G/DL (ref 2.3–3.5)
GLUCOSE BLD STRIP.AUTO-MCNC: 150 MG/DL (ref 65–100)
GLUCOSE SERPL-MCNC: 132 MG/DL (ref 65–100)
HCT VFR BLD AUTO: 30.4 % (ref 35.8–46.3)
HGB BLD-MCNC: 9.7 G/DL (ref 11.7–15.4)
IMM GRANULOCYTES # BLD AUTO: 0.3 K/UL (ref 0–0.5)
IMM GRANULOCYTES NFR BLD AUTO: 3 % (ref 0–5)
LYMPHOCYTES # BLD: 2 K/UL (ref 0.5–4.6)
LYMPHOCYTES NFR BLD: 17 % (ref 13–44)
MCH RBC QN AUTO: 24.9 PG (ref 26.1–32.9)
MCHC RBC AUTO-ENTMCNC: 31.9 G/DL (ref 31.4–35)
MCV RBC AUTO: 78.1 FL (ref 79.6–97.8)
MONOCYTES # BLD: 0.2 K/UL (ref 0.1–1.3)
MONOCYTES NFR BLD: 2 % (ref 4–12)
NEUTS SEG # BLD: 9.6 K/UL (ref 1.7–8.2)
NEUTS SEG NFR BLD: 79 % (ref 43–78)
NRBC # BLD: 0.17 K/UL (ref 0–0.2)
P-R INTERVAL, ECG05: 132 MS
P-R INTERVAL, ECG05: 136 MS
PLATELET # BLD AUTO: 207 K/UL (ref 150–450)
PMV BLD AUTO: 9.6 FL (ref 9.4–12.3)
POTASSIUM SERPL-SCNC: 3.1 MMOL/L (ref 3.5–5.1)
PROT SERPL-MCNC: 5.2 G/DL (ref 6.3–8.2)
Q-T INTERVAL, ECG07: 310 MS
Q-T INTERVAL, ECG07: 314 MS
QRS DURATION, ECG06: 74 MS
QRS DURATION, ECG06: 84 MS
QTC CALCULATION (BEZET), ECG08: 448 MS
QTC CALCULATION (BEZET), ECG08: 451 MS
RBC # BLD AUTO: 3.89 M/UL (ref 4.05–5.2)
SERVICE CMNT-IMP: ABNORMAL
SODIUM SERPL-SCNC: 139 MMOL/L (ref 136–145)
SOURCE, COVRS: ABNORMAL
SPECIMEN EXP DATE BLD: NORMAL
STATUS OF UNIT,%ST: NORMAL
STATUS OF UNIT,%ST: NORMAL
TSH SERPL DL<=0.005 MIU/L-ACNC: 3.11 UIU/ML (ref 0.36–3.74)
UNIT DIVISION, %UDIV: 0
UNIT DIVISION, %UDIV: 0
VENTRICULAR RATE, ECG03: 124 BPM
VENTRICULAR RATE, ECG03: 126 BPM
WBC # BLD AUTO: 12.2 K/UL (ref 4.3–11.1)

## 2021-01-11 PROCEDURE — 74011250637 HC RX REV CODE- 250/637: Performed by: INTERNAL MEDICINE

## 2021-01-11 PROCEDURE — 2709999900 HC NON-CHARGEABLE SUPPLY

## 2021-01-11 PROCEDURE — 82962 GLUCOSE BLOOD TEST: CPT

## 2021-01-11 PROCEDURE — 65270000029 HC RM PRIVATE

## 2021-01-11 PROCEDURE — 85025 COMPLETE CBC W/AUTO DIFF WBC: CPT

## 2021-01-11 PROCEDURE — 85379 FIBRIN DEGRADATION QUANT: CPT

## 2021-01-11 PROCEDURE — 94762 N-INVAS EAR/PLS OXIMTRY CONT: CPT

## 2021-01-11 PROCEDURE — 80053 COMPREHEN METABOLIC PANEL: CPT

## 2021-01-11 PROCEDURE — 94760 N-INVAS EAR/PLS OXIMETRY 1: CPT

## 2021-01-11 PROCEDURE — 74011250636 HC RX REV CODE- 250/636: Performed by: INTERNAL MEDICINE

## 2021-01-11 PROCEDURE — 74011000250 HC RX REV CODE- 250: Performed by: HOSPITALIST

## 2021-01-11 PROCEDURE — 99255 IP/OBS CONSLTJ NEW/EST HI 80: CPT | Performed by: INTERNAL MEDICINE

## 2021-01-11 PROCEDURE — 93306 TTE W/DOPPLER COMPLETE: CPT

## 2021-01-11 PROCEDURE — 84443 ASSAY THYROID STIM HORMONE: CPT

## 2021-01-11 PROCEDURE — 74011000258 HC RX REV CODE- 258: Performed by: INTERNAL MEDICINE

## 2021-01-11 RX ORDER — POTASSIUM CHLORIDE 20 MEQ/1
40 TABLET, EXTENDED RELEASE ORAL
Status: COMPLETED | OUTPATIENT
Start: 2021-01-11 | End: 2021-01-11

## 2021-01-11 RX ADMIN — SIMETHICONE 80 MG: 80 TABLET, CHEWABLE ORAL at 11:42

## 2021-01-11 RX ADMIN — POTASSIUM CHLORIDE 40 MEQ: 20 TABLET, EXTENDED RELEASE ORAL at 11:42

## 2021-01-11 RX ADMIN — NIFEDIPINE 20 MG: 10 CAPSULE ORAL at 20:34

## 2021-01-11 RX ADMIN — Medication 1000 MG: at 20:35

## 2021-01-11 RX ADMIN — METOPROLOL TARTRATE 5 MG: 5 INJECTION INTRAVENOUS at 20:09

## 2021-01-11 RX ADMIN — NIFEDIPINE 20 MG: 10 CAPSULE ORAL at 08:41

## 2021-01-11 RX ADMIN — Medication 1000 MG: at 17:43

## 2021-01-11 RX ADMIN — POLYETHYLENE GLYCOL 3350 17 G: 17 POWDER, FOR SOLUTION ORAL at 08:42

## 2021-01-11 RX ADMIN — NIFEDIPINE 20 MG: 10 CAPSULE ORAL at 01:43

## 2021-01-11 RX ADMIN — CEFTRIAXONE SODIUM 2 G: 2 INJECTION, POWDER, FOR SOLUTION INTRAMUSCULAR; INTRAVENOUS at 13:00

## 2021-01-11 RX ADMIN — ACETAMINOPHEN 1000 MG: 500 TABLET, FILM COATED ORAL at 20:10

## 2021-01-11 RX ADMIN — DEXAMETHASONE SODIUM PHOSPHATE 6 MG: 10 INJECTION INTRAMUSCULAR; INTRAVENOUS at 13:00

## 2021-01-11 RX ADMIN — DOCUSATE SODIUM 100 MG: 100 CAPSULE ORAL at 08:41

## 2021-01-11 RX ADMIN — Medication 1000 MG: at 08:41

## 2021-01-11 RX ADMIN — IBUPROFEN 800 MG: 800 TABLET ORAL at 00:02

## 2021-01-11 RX ADMIN — Medication 220 MG: at 08:41

## 2021-01-11 RX ADMIN — DOCUSATE SODIUM 100 MG: 100 CAPSULE ORAL at 17:43

## 2021-01-11 RX ADMIN — NIFEDIPINE 20 MG: 10 CAPSULE ORAL at 13:55

## 2021-01-11 RX ADMIN — AZITHROMYCIN MONOHYDRATE 500 MG: 250 TABLET ORAL at 08:41

## 2021-01-11 NOTE — PROGRESS NOTES
END OF SHIFT NOTE: Resting in bed without complaints. SOB on exertion. Oxygen decreased to 8 liters high flow. Breast pump and fridge in room. Has not changed roxann pad since coming to room. Hourly rounds completed, all needs met. Will continue to monitor until night shift nurse takes over. INTAKE/OUTPUT  No intake/output data recorded. Voiding: YES  Catheter: NO  Color: clear  Drain:              DIET  regular    Flatus: Patient does have flatus present. Stool:  0 occurrences. Characteristics:       Ambulating  Yes to bathroom    Emesis: 0 occurrences.     Characteristics:          VITAL SIGNS  Patient Vitals for the past 12 hrs:   Temp Pulse Resp BP SpO2   01/10/21 1816 -- -- -- -- 95 %   01/10/21 1729 98.5 °F (36.9 °C) (!) 116 22 138/85 95 %       Pain Assessment  Pain Intensity 1: 0 (01/10/21 1410)        Patient Stated Pain Goal: 0            Claudia Live RN

## 2021-01-11 NOTE — PROGRESS NOTES
Patient resting in room. No signs or symptoms of distress. No changes in status. Patient denies any further needs or pain at this time. Have collected breast milk with another RN that stayed \"clean\". Placed milk in refrigerator in room labeled with baby's name, date/time. Pt has had only scant blood loss to roxann pads this shift. Lower abd incision is JESSIE, intact, no drainage. Bed in low position and call light/ personal items within reach. Will continue to monitor and give bedside shift report to oncoming day shift nurse.

## 2021-01-11 NOTE — PROGRESS NOTES
Helped pt back to bed. Administered Lopressor PER MAR. Spoke with Flakito Ball in ER about STAT EKG per Dr Oneil Rowell. Will continue to monitor.

## 2021-01-11 NOTE — PROGRESS NOTES
SW is familiar with patient has we met prior to delivery on 21 (please see note). Patient delivered a baby girl on 21 via  (33w1d). Patient was subsequently transferred to [address] [place] [address] on 1/10/21 due to + COVID diagnosis. In addition to myself ([facility] ), patient is being followed by  on  Med/Surg Unit. Currently, patient and FOB are not allowed to visit baby in NICU due to COVID diagnosis/exposure. However, baby's grandmother and aunt have been given visitation privileges to visit baby. Phone call placed to patient. Emotional support provided regarding transfer to DT while baby is in the NICU. Per patient, \"I don't understand why I'm here. \"  SW'r encouraged patient to follow-up with her physician about her plan of care. Patient states that she has received recent pictures of baby. SW reinforced that NICU staff is available for support. SW will continue to follow.       PATRICIO Lujan  [facility]   [phone]

## 2021-01-11 NOTE — ACP (ADVANCE CARE PLANNING)
Advance Care Planning     General Advance Care Planning (ACP) Conversation      Date of Conversation: 1/10/2021  Conducted with: Patient with [address] Decision Maker: Fiance'/baby's Father Ketty Mcbride [phone]     Click here to [address] including selection of the Healthcare Decision Maker Relationship (ie \"Primary\")  Today we documented desired Decision Maker(s), who is (are) NOT Legal Next of [facility] 71. ACP documents are required for decision maker authority. Content/Action Overview:   Has NO ACP documents/care preferences - information provided, considering goals and options  Reviewed DNR/DNI and patient elects Full Code (Attempt Resuscitation)         Length of Voluntary ACP Conversation in minutes:  <16 minutes (Non-Billable)     These are patient's current wishes as discussed at bedside today and are not intended to take place of [address]       Nicola Lockett LMSW

## 2021-01-11 NOTE — PROGRESS NOTES
[address]                Date of  Admission: 1/10/2021  3:48 PM     PCP: Ole, MD Shekhar  Requested by: Dr Libby Dexter  Primary Cardiologist: None  APC Attending:  [address]    Reason for Cardiology Request: Michelle Meol is a 31 y.o. female with hx of Anemia, chlamydia, essential hypertension, preeclampsia, now status post  on 2021 with delivery of a female child. Patient has known positive Covid test on 2021 was on room air until 1/10/2021. Her chest x-ray showed bilateral infiltrates with CTA showing no PE. Patient started to have desaturation with ambulation when she refused to use her O2 to get to the shower and is now on high flow O2. Patient was transferred from [facility] to [facility]. Patient currently is declining the use of convalescent plasma redness failure but agrees to the use of steroids and antibiotics. Will also be started on vitamin C and zinc.  Review of most current labs showed a BC of 12.2 D-dimer 2.70, 3.1, creatinine 0.46, calcium 8.2, TSH normal, NT proBNP of 59. Past Medical History:   Diagnosis Date    Anemia     Chlamydia     Disease of blood and blood forming organ     Essential hypertension     Postpartum depression     Rh negative state in antepartum period     Uterine fibroid       No past surgical history on file.   No Known Allergies   Family History   Problem Relation Age of Onset    Schizophrenia Brother       Social History     Socioeconomic History    Marital status: SINGLE     Spouse name: Not on file    Number of children: Not on file    Years of education: Not on file    Highest education level: Not on file   Occupational History    Not on file   Social Needs    Financial resource strain: Not on file    Food insecurity     Worry: Not on file     Inability: Not on file    Transportation needs     Medical: Not on file     Non-medical: Not on file   Tobacco Use    Smoking status: Never Smoker    Smokeless tobacco: Never Used   Substance and Sexual Activity    Alcohol use: Never     Frequency: Never    Drug use: Never    Sexual activity: Yes     Partners: Male     Birth control/protection: None   Lifestyle    Physical activity     Days per week: Not on file     Minutes per session: Not on file    Stress: Not on file   Relationships    Social connections     Talks on phone: Not on file     Gets together: Not on file     Attends Caodaism service: Not on file     Active member of club or organization: Not on file     Attends meetings of clubs or organizations: Not on file     Relationship status: Not on file    Intimate partner violence     Fear of current or ex partner: Not on file     Emotionally abused: Not on file     Physically abused: Not on file     Forced sexual activity: Not on file   Other Topics Concern     Service Not Asked    Blood Transfusions Not Asked    Caffeine Concern Not Asked    Occupational Exposure Not Asked   [place] Hazards Not Asked    Sleep Concern Not Asked    Stress Concern Not Asked    Weight Concern Not Asked    Special Diet Not Asked    Back Care Not Asked    Exercise Not Asked    Bike Helmet Not Asked   [address] Not Asked    Self-Exams Not Asked   Social History Narrative    Not on file       Prior to Admission Medications   Prescriptions Last Dose Informant Patient Reported? Taking? Blood Pressure Test Kit-Medium kit   No No   Si Each by Does Not Apply route daily. Prescribe cuff her insurance will cover   Iron Fum & PS Cmp-FA-Vit C-B3 (Integra F) 125-1-40-3 mg cap   No No   Sig: Take 1 Each by mouth daily. Indications: anemia from inadequate iron   aspirin 81 mg chewable tablet   Yes No   Sig: Take 162 mg by mouth daily. calcium carbonate (TUMS) 200 mg calcium (500 mg) chew   Yes No   Sig: Take 1 Tab by mouth daily. cholecalciferol, vitamin D3, (Vitamin D3) 50 mcg (2,000 unit) tab   Yes No   Sig: Take  by mouth.    famotidine (PEPCID) 20 mg tablet   No No   Sig: Take 1 Tab by mouth two (2) times a day. Indications: gastroesophageal reflux disease   progesterone (PROMETRIUM) 200 mg capsule   No No   Sig: Insert 1 Cap into vagina two (2) times a day. Patient taking differently: Insert 200 mg into vagina nightly. Facility-Administered Medications: None       Current Facility-Administered Medications   Medication Dose Route Frequency    acetaminophen (TYLENOL) tablet 1,000 mg  1,000 mg Oral Q6H PRN    naloxone (NARCAN) injection 0.2 mg  0.2 mg IntraVENous ONCE PRN    diphenhydrAMINE (BENADRYL) injection 12.5 mg  12.5 mg IntraVENous Q6H PRN    promethazine (PHENERGAN) with saline injection 6.25 mg  6.25 mg IntraVENous Q6H PRN    simethicone (MYLICON) tablet 80 mg  80 mg Oral AC&HS    0.9% sodium chloride infusion 250 mL  250 mL IntraVENous PRN    albuterol (PROVENTIL HFA, VENTOLIN HFA, PROAIR HFA) inhaler 2 Puff  2 Puff Inhalation Q6H RT    ascorbic acid (vitamin C) (VITAMIN C) tablet 1,000 mg  1,000 mg Oral TID    azithromycin (ZITHROMAX) tablet 500 mg  500 mg Oral DAILY    cefTRIAXone (ROCEPHIN) 2 g in 0.9% sodium chloride (MBP/ADV) 50 mL MBP  2 g IntraVENous Q24H    dexamethasone (DECADRON) 10 mg/mL injection 6 mg  6 mg IntraVENous Q24H    docusate sodium (COLACE) capsule 100 mg  100 mg Oral BID    ibuprofen (MOTRIN) tablet 800 mg  800 mg Oral Q6H PRN    NIFEdipine (PROCARDIA) capsule 20 mg  20 mg Oral Q6H    polyethylene glycol (MIRALAX) packet 17 g  17 g Oral DAILY    zinc sulfate tablet 220 mg  220 mg Oral DAILY    metoprolol (LOPRESSOR) injection 5 mg  5 mg IntraVENous Q6H PRN       Review of Systems   Constitution: Negative for weight gain and weight loss. HENT: Negative. Eyes: Negative. Cardiovascular: Positive for dyspnea on exertion.  Negative for chest pain (currently pain free), claudication, cyanosis, irregular heartbeat, leg swelling, near-syncope, orthopnea, palpitations, paroxysmal nocturnal dyspnea and syncope. Respiratory: Positive for shortness of breath. Negative for cough and wheezing. Endocrine: Negative. Skin: Negative. Musculoskeletal: Negative. Gastrointestinal: Negative for nausea and vomiting. Genitourinary: Negative. Neurological: Negative for dizziness. Psychiatric/Behavioral: Negative. Allergic/Immunologic: Negative. Physical Exam  Vitals:    01/11/21 0219 01/11/21 0400 01/11/21 0418 01/11/21 0712   BP:   114/76 114/68   Pulse:  (!) 116 (!) 118 (!) 120   Resp:   26 22   Temp:   98.8 °F (37.1 °C) 98.1 °F (36.7 °C)   SpO2: 91%  91% 93%       There were no vitals filed for this visit. Intake/Output Summary (Last 24 hours) at 1/11/2021 0837  Last data filed at 1/11/2021 0423  Gross per 24 hour   Intake 300 ml   Output 200 ml   Net 100 ml       Net IO Since Admission: 100 mL [01/11/21 0837]      Physical Exam:  Physical exam defaulted to attending due to COVID-19 this is for the preservation of PPE and to lessen the spread of the virus. See attendings full consult note for details. Recent Results (from the past 24 hour(s))   D DIMER    Collection Time: 01/10/21 10:49 AM   Result Value Ref Range    D DIMER 3.48 (H) <0.56 ug/ml(FEU)   CBC WITH AUTOMATED DIFF    Collection Time: 01/10/21 10:49 AM   Result Value Ref Range    WBC 10.8 4.3 - 11.1 K/uL    RBC 3.96 (L) 4.05 - 5.2 M/uL    HGB 9.9 (L) 11.7 - 15.4 g/dL    HCT 31.5 (L) 35.8 - 46.3 %    MCV 79.5 (L) 79.6 - 97.8 FL    MCH 25.0 (L) 26.1 - 32.9 PG    MCHC 31.4 31.4 - 35.0 g/dL    RDW 20.1 (H) 11.9 - 14.6 %    PLATELET 956 592 - 670 K/uL    MPV 9.7 9.4 - 12.3 FL    ABSOLUTE NRBC 0.18 0.0 - 0.2 K/uL    DF AUTOMATED      NEUTROPHILS 82 (H) 43 - 78 %    LYMPHOCYTES 13 13 - 44 %    MONOCYTES 2 (L) 4.0 - 12.0 %    EOSINOPHILS 0 (L) 0.5 - 7.8 %    BASOPHILS 0 0.0 - 2.0 %    IMMATURE GRANULOCYTES 2 0.0 - 5.0 %    ABS. NEUTROPHILS 8.8 (H) 1.7 - 8.2 K/UL    ABS. LYMPHOCYTES 1.4 0.5 - 4.6 K/UL    ABS.  MONOCYTES 0.2 0.1 - 1.3 K/UL    ABS. EOSINOPHILS 0.0 0.0 - 0.8 K/UL    ABS. BASOPHILS 0.0 0.0 - 0.2 K/UL    ABS. IMM. GRANS. 0.3 0.0 - 0.5 K/UL   METABOLIC PANEL, COMPREHENSIVE    Collection Time: 01/10/21 10:49 AM   Result Value Ref Range    Sodium 136 136 - 145 mmol/L    Potassium 3.5 3.5 - 5.1 mmol/L    Chloride 104 98 - 107 mmol/L    CO2 23 21 - 32 mmol/L    Anion gap 9 7 - 16 mmol/L    Glucose 169 (H) 65 - 100 mg/dL    BUN 8 6 - 23 MG/DL    Creatinine 0.64 0.6 - 1.0 MG/DL    GFR est AA >60 >60 ml/min/1.73m2    GFR est non-AA >60 >60 ml/min/1.73m2    Calcium 8.0 (L) 8.3 - 10.4 MG/DL    Bilirubin, total 0.4 0.2 - 1.1 MG/DL    ALT (SGPT) 22 12 - 65 U/L    AST (SGOT) 48 (H) 15 - 37 U/L    Alk.  phosphatase 82 50 - 130 U/L    Protein, total 5.1 (L) 6.3 - 8.2 g/dL    Albumin 1.9 (L) 3.5 - 5.0 g/dL    Globulin 3.2 2.3 - 3.5 g/dL    A-G Ratio 0.6 (L) 1.2 - 3.5     NT-PRO BNP    Collection Time: 01/10/21 10:49 AM   Result Value Ref Range    NT pro-BNP 59 5 - 125 PG/ML   EKG, 12 LEAD, INITIAL    Collection Time: 01/10/21 12:23 PM   Result Value Ref Range    Ventricular Rate 126 BPM    Atrial Rate 126 BPM    P-R Interval 132 ms    QRS Duration 74 ms    Q-T Interval 310 ms    QTC Calculation (Bezet) 448 ms    Calculated P Axis 37 degrees    Calculated R Axis 45 degrees    Calculated T Axis 9 degrees    Diagnosis       Sinus tachycardia  Nonspecific T wave abnormality  Abnormal ECG  No previous ECGs available  Confirmed by ART ARMSTRONG (), Deb Champion ([id number]) on 1/11/2021 5:46:29 AM     POC G3    Collection Time: 01/10/21 12:34 PM   Result Value Ref Range    Device: High Flow Nasal Cannula      FIO2 (POC) 48 %    pH (POC) 7.42 7.35 - 7.45      pCO2 (POC) 33.2 (L) 35 - 45 MMHG    pO2 (POC) 63 (L) 75 - 100 MMHG    HCO3 (POC) 21.7 (L) 22 - 26 MMOL/L    sO2 (POC) 92 (L) 95 - 98 %    Base deficit (POC) 2 mmol/L    Allens test (POC) YES      Site RIGHT RADIAL      Specimen type (POC) ARTERIAL      Performed by Sam)RTBS     CO2, POC 23 MMOL/L Flow rate (POC) 9.000 L/min    COLLECT TIME 1,225     SARS-COV-2    Collection Time: 01/10/21  6:00 PM   Result Value Ref Range    Specimen source Nasopharyngeal      COVID-19 rapid test Detected (AA) NOTD     EKG, 12 LEAD, SUBSEQUENT    Collection Time: 01/10/21  9:24 PM   Result Value Ref Range    Ventricular Rate 124 BPM    Atrial Rate 124 BPM    P-R Interval 136 ms    QRS Duration 84 ms    Q-T Interval 314 ms    QTC Calculation (Bezet) 451 ms    Calculated P Axis 53 degrees    Calculated R Axis 86 degrees    Calculated T Axis 24 degrees    Diagnosis       Sinus tachycardia  Nonspecific T wave abnormality  Abnormal ECG  When compared with ECG of 10-KELLEE-2021 12:23,  No significant change was found     CBC WITH AUTOMATED DIFF    Collection Time: 01/11/21  4:00 AM   Result Value Ref Range    WBC 12.2 (H) 4.3 - 11.1 K/uL    RBC 3.89 (L) 4.05 - 5.2 M/uL    HGB 9.7 (L) 11.7 - 15.4 g/dL    HCT 30.4 (L) 35.8 - 46.3 %    MCV 78.1 (L) 79.6 - 97.8 FL    MCH 24.9 (L) 26.1 - 32.9 PG    MCHC 31.9 31.4 - 35.0 g/dL    RDW 20.2 (H) 11.9 - 14.6 %    PLATELET 720 378 - 560 K/uL    MPV 9.6 9.4 - 12.3 FL    ABSOLUTE NRBC 0.17 0.0 - 0.2 K/uL    DF AUTOMATED      NEUTROPHILS 79 (H) 43 - 78 %    LYMPHOCYTES 17 13 - 44 %    MONOCYTES 2 (L) 4.0 - 12.0 %    EOSINOPHILS 0 (L) 0.5 - 7.8 %    BASOPHILS 0 0.0 - 2.0 %    IMMATURE GRANULOCYTES 3 0.0 - 5.0 %    ABS. NEUTROPHILS 9.6 (H) 1.7 - 8.2 K/UL    ABS. LYMPHOCYTES 2.0 0.5 - 4.6 K/UL    ABS. MONOCYTES 0.2 0.1 - 1.3 K/UL    ABS. EOSINOPHILS 0.0 0.0 - 0.8 K/UL    ABS. BASOPHILS 0.0 0.0 - 0.2 K/UL    ABS. IMM.  GRANS. 0.3 0.0 - 0.5 K/UL   D DIMER    Collection Time: 01/11/21  4:00 AM   Result Value Ref Range    D DIMER 2.70 (H) <0.56 ug/ml(FEU)   METABOLIC PANEL, COMPREHENSIVE    Collection Time: 01/11/21  4:00 AM   Result Value Ref Range    Sodium 139 136 - 145 mmol/L    Potassium 3.1 (L) 3.5 - 5.1 mmol/L    Chloride 106 98 - 107 mmol/L    CO2 26 21 - 32 mmol/L    Anion gap 7 7 - 16 mmol/L Glucose 132 (H) 65 - 100 mg/dL    BUN 8 6 - 23 MG/DL    Creatinine 0.46 (L) 0.6 - 1.0 MG/DL    GFR est AA >60 >60 ml/min/1.73m2    GFR est non-AA >60 >60 ml/min/1.73m2    Calcium 8.2 (L) 8.3 - 10.4 MG/DL    Bilirubin, total 0.4 0.2 - 1.1 MG/DL    ALT (SGPT) 24 12 - 65 U/L    AST (SGOT) 47 (H) 15 - 37 U/L    Alk. phosphatase 107 50 - 136 U/L    Protein, total 5.2 (L) 6.3 - 8.2 g/dL    Albumin 2.0 (L) 3.5 - 5.0 g/dL    Globulin 3.2 2.3 - 3.5 g/dL    A-G Ratio 0.6 (L) 1.2 - 3.5     TSH 3RD GENERATION    Collection Time: 21  4:00 AM   Result Value Ref Range    TSH 3.110 0.358 - 3.740 uIU/mL        No results found. Echo Results  (Last 48 hours)    None            Initial Recommendations:        Principal Problem:    COVID-19 affecting pregnancy in third trimester (2021) Per Primary Team      Overview: Sx 21      Positive 21      Precautions through 21    Active Problems:    Tachycardia (2021) Likely 2nd to SOB, COVID19, and Anemia. Treat underlying cause. With recent C Section will get echo per Dr Valery Marx. Correct electrolyte imbalances. Further recommendations pending clinical course and attending recommendations. Her sinus tach is expected physiologic response to current stressors      H/O  section (2021) Per OB/GYN      SOB (shortness of breath) (2021) Per Primary Team on High Flow 02 at this time. Anemic with COVID19 infection. Further recommendations pending clinical course and attending recommendations. Thank you very much for this referral. We appreciate the opportunity to participate in this patient's care. We will follow along with above stated plan. This is initial management plan but please see attendings consult note for full plan of care.     Lilli Villeda NP [facility]

## 2021-01-11 NOTE — PROGRESS NOTES
Spoke with  to verify COVID swab was received along with order sent by this nurse over 1 hour ago. John verified COVID rapid is positive. Per John policy does not allow a send out for verification.

## 2021-01-11 NOTE — PROGRESS NOTES
LMSW spoke with pt by phone in room to complete CM assessment. Pt was transferred from  to  for treatment of COVID/symptoms. Pt had a C section 3/1/5744 due to complications and delivered a 29 week old baby girl who remains at  in NICU. The baby's Fatherib She reports today that her Mother and Jozef Lee have not been exposed to [place] and they are being able to visit with her baby and send her updates and pictures. Pt is normally independent with her ADL's. Appreciate that EDMom/Infant CM is also following pt and providing her with updates from NICU as her new baby girl is the pt's primary focus. Will follow pt plan of care and assist as needed. Will refer pt as requested for a [facility] PCP follow up appt as she reports no PCP.   Care Management Interventions  PCP Verified by CM: (agreeable to a [facility] PCP referral)  Mode of Transport at Discharge: (family)  Transition of Care Consult (CM Consult): Discharge Planning(Pt is insured by [facility] with pharmacy benefits.  )  Discharge Durable Medical Equipment: No  Physical Therapy Consult: No  Occupational Therapy Consult: No  Speech Therapy Consult: No  Current Support Network: Own Home(Lives with sig other.)  Confirm Follow Up Transport: Family  Name of the Patient Representative Who was Provided with a Choice of Provider and Agrees with the Discharge Plan: pt  [employer] Information Provided?: No  Discharge Location  Discharge Placement: Home

## 2021-01-11 NOTE — PROGRESS NOTES
HR at 121 per tele after Lopressor per [facility]. EKG being performed at this time. Pt resting in bed.02 saturation at per monitor at 92%.

## 2021-01-11 NOTE — PROGRESS NOTES
HR on patient has increased and holding at 150 per tele for the last 4-5 min. Pt reports asymptomatic. Mild incision pain. Given Tylenol. MD notified. Will admin the Lopressor and continue to monitor.

## 2021-01-12 ENCOUNTER — APPOINTMENT (OUTPATIENT)
Dept: GENERAL RADIOLOGY | Age: 36
End: 2021-01-12
Attending: INTERNAL MEDICINE
Payer: COMMERCIAL

## 2021-01-12 LAB
ALBUMIN SERPL-MCNC: 2 G/DL (ref 3.5–5)
ALBUMIN SERPL-MCNC: 2.1 G/DL (ref 3.5–5)
ALBUMIN/GLOB SERPL: 0.5 {RATIO} (ref 1.2–3.5)
ALBUMIN/GLOB SERPL: 0.5 {RATIO} (ref 1.2–3.5)
ALP SERPL-CCNC: 123 U/L (ref 50–136)
ALP SERPL-CCNC: 127 U/L (ref 50–136)
ALT SERPL-CCNC: 25 U/L (ref 12–65)
ALT SERPL-CCNC: 26 U/L (ref 12–65)
ANION GAP SERPL CALC-SCNC: 6 MMOL/L (ref 7–16)
ANION GAP SERPL CALC-SCNC: 6 MMOL/L (ref 7–16)
AST SERPL-CCNC: 62 U/L (ref 15–37)
AST SERPL-CCNC: 63 U/L (ref 15–37)
BASOPHILS # BLD: 0 K/UL (ref 0–0.2)
BASOPHILS # BLD: 0 K/UL (ref 0–0.2)
BASOPHILS NFR BLD: 0 % (ref 0–2)
BASOPHILS NFR BLD: 0 % (ref 0–2)
BILIRUB SERPL-MCNC: 0.2 MG/DL (ref 0.2–1.1)
BILIRUB SERPL-MCNC: 0.3 MG/DL (ref 0.2–1.1)
BNP SERPL-MCNC: 111 PG/ML (ref 5–125)
BNP SERPL-MCNC: 125 PG/ML (ref 5–125)
BUN SERPL-MCNC: 7 MG/DL (ref 6–23)
BUN SERPL-MCNC: 8 MG/DL (ref 6–23)
CALCIUM SERPL-MCNC: 8.4 MG/DL (ref 8.3–10.4)
CALCIUM SERPL-MCNC: 8.8 MG/DL (ref 8.3–10.4)
CHLORIDE SERPL-SCNC: 106 MMOL/L (ref 98–107)
CHLORIDE SERPL-SCNC: 108 MMOL/L (ref 98–107)
CO2 SERPL-SCNC: 26 MMOL/L (ref 21–32)
CO2 SERPL-SCNC: 26 MMOL/L (ref 21–32)
CREAT SERPL-MCNC: 0.46 MG/DL (ref 0.6–1)
CREAT SERPL-MCNC: 0.5 MG/DL (ref 0.6–1)
CRP SERPL-MCNC: 15.9 MG/DL (ref 0–0.9)
D DIMER PPP FEU-MCNC: 2.26 UG/ML(FEU)
D DIMER PPP FEU-MCNC: 4.77 UG/ML(FEU)
DIFFERENTIAL METHOD BLD: ABNORMAL
DIFFERENTIAL METHOD BLD: ABNORMAL
EOSINOPHIL # BLD: 0 K/UL (ref 0–0.8)
EOSINOPHIL # BLD: 0 K/UL (ref 0–0.8)
EOSINOPHIL NFR BLD: 0 % (ref 0.5–7.8)
EOSINOPHIL NFR BLD: 0 % (ref 0.5–7.8)
ERYTHROCYTE [DISTWIDTH] IN BLOOD BY AUTOMATED COUNT: 20.2 % (ref 11.9–14.6)
ERYTHROCYTE [DISTWIDTH] IN BLOOD BY AUTOMATED COUNT: 20.4 % (ref 11.9–14.6)
GLOBULIN SER CALC-MCNC: 3.7 G/DL (ref 2.3–3.5)
GLOBULIN SER CALC-MCNC: 4.5 G/DL (ref 2.3–3.5)
GLUCOSE BLD STRIP.AUTO-MCNC: 108 MG/DL (ref 65–100)
GLUCOSE BLD STRIP.AUTO-MCNC: 173 MG/DL (ref 65–100)
GLUCOSE BLD STRIP.AUTO-MCNC: 174 MG/DL (ref 65–100)
GLUCOSE BLD STRIP.AUTO-MCNC: 229 MG/DL (ref 65–100)
GLUCOSE SERPL-MCNC: 102 MG/DL (ref 65–100)
GLUCOSE SERPL-MCNC: 134 MG/DL (ref 65–100)
HCT VFR BLD AUTO: 35.1 % (ref 35.8–46.3)
HCT VFR BLD AUTO: 35.5 % (ref 35.8–46.3)
HGB BLD-MCNC: 11.1 G/DL (ref 11.7–15.4)
HGB BLD-MCNC: 11.2 G/DL (ref 11.7–15.4)
IMM GRANULOCYTES # BLD AUTO: 0.4 K/UL (ref 0–0.5)
IMM GRANULOCYTES # BLD AUTO: 0.6 K/UL (ref 0–0.5)
IMM GRANULOCYTES NFR BLD AUTO: 3 % (ref 0–5)
IMM GRANULOCYTES NFR BLD AUTO: 4 % (ref 0–5)
LYMPHOCYTES # BLD: 1.8 K/UL (ref 0.5–4.6)
LYMPHOCYTES # BLD: 2.1 K/UL (ref 0.5–4.6)
LYMPHOCYTES NFR BLD: 13 % (ref 13–44)
LYMPHOCYTES NFR BLD: 15 % (ref 13–44)
MAGNESIUM SERPL-MCNC: 1.5 MG/DL (ref 1.8–2.4)
MCH RBC QN AUTO: 24.7 PG (ref 26.1–32.9)
MCH RBC QN AUTO: 24.9 PG (ref 26.1–32.9)
MCHC RBC AUTO-ENTMCNC: 31.5 G/DL (ref 31.4–35)
MCHC RBC AUTO-ENTMCNC: 31.6 G/DL (ref 31.4–35)
MCV RBC AUTO: 78.2 FL (ref 79.6–97.8)
MCV RBC AUTO: 78.9 FL (ref 79.6–97.8)
MONOCYTES # BLD: 0.3 K/UL (ref 0.1–1.3)
MONOCYTES # BLD: 0.3 K/UL (ref 0.1–1.3)
MONOCYTES NFR BLD: 2 % (ref 4–12)
MONOCYTES NFR BLD: 2 % (ref 4–12)
NEUTS SEG # BLD: 11 K/UL (ref 1.7–8.2)
NEUTS SEG # BLD: 11.1 K/UL (ref 1.7–8.2)
NEUTS SEG NFR BLD: 79 % (ref 43–78)
NEUTS SEG NFR BLD: 81 % (ref 43–78)
NRBC # BLD: 0.17 K/UL (ref 0–0.2)
NRBC # BLD: 0.2 K/UL (ref 0–0.2)
PHOSPHATE SERPL-MCNC: 3.7 MG/DL (ref 2.5–4.5)
PLATELET # BLD AUTO: 280 K/UL (ref 150–450)
PLATELET # BLD AUTO: 315 K/UL (ref 150–450)
PMV BLD AUTO: 9.7 FL (ref 9.4–12.3)
PMV BLD AUTO: 9.8 FL (ref 9.4–12.3)
POTASSIUM SERPL-SCNC: 3.5 MMOL/L (ref 3.5–5.1)
POTASSIUM SERPL-SCNC: 3.7 MMOL/L (ref 3.5–5.1)
PROCALCITONIN SERPL-MCNC: 1.37 NG/ML
PROCALCITONIN SERPL-MCNC: 1.41 NG/ML
PROT SERPL-MCNC: 5.7 G/DL (ref 6.3–8.2)
PROT SERPL-MCNC: 6.6 G/DL (ref 6.3–8.2)
RBC # BLD AUTO: 4.45 M/UL (ref 4.05–5.2)
RBC # BLD AUTO: 4.54 M/UL (ref 4.05–5.2)
SODIUM SERPL-SCNC: 138 MMOL/L (ref 136–145)
SODIUM SERPL-SCNC: 140 MMOL/L (ref 136–145)
WBC # BLD AUTO: 13.6 K/UL (ref 4.3–11.1)
WBC # BLD AUTO: 14 K/UL (ref 4.3–11.1)

## 2021-01-12 PROCEDURE — 99233 SBSQ HOSP IP/OBS HIGH 50: CPT | Performed by: OBSTETRICS & GYNECOLOGY

## 2021-01-12 PROCEDURE — 86140 C-REACTIVE PROTEIN: CPT

## 2021-01-12 PROCEDURE — 77030038269 HC DRN EXT URIN PURWCK BARD -A

## 2021-01-12 PROCEDURE — 74011636637 HC RX REV CODE- 636/637: Performed by: INTERNAL MEDICINE

## 2021-01-12 PROCEDURE — 74011250636 HC RX REV CODE- 250/636: Performed by: INTERNAL MEDICINE

## 2021-01-12 PROCEDURE — 36415 COLL VENOUS BLD VENIPUNCTURE: CPT

## 2021-01-12 PROCEDURE — 80053 COMPREHEN METABOLIC PANEL: CPT

## 2021-01-12 PROCEDURE — 71045 X-RAY EXAM CHEST 1 VIEW: CPT

## 2021-01-12 PROCEDURE — 85025 COMPLETE CBC W/AUTO DIFF WBC: CPT

## 2021-01-12 PROCEDURE — 83880 ASSAY OF NATRIURETIC PEPTIDE: CPT

## 2021-01-12 PROCEDURE — 65270000029 HC RM PRIVATE

## 2021-01-12 PROCEDURE — 99291 CRITICAL CARE FIRST HOUR: CPT | Performed by: INTERNAL MEDICINE

## 2021-01-12 PROCEDURE — 2709999900 HC NON-CHARGEABLE SUPPLY

## 2021-01-12 PROCEDURE — 74011000258 HC RX REV CODE- 258: Performed by: INTERNAL MEDICINE

## 2021-01-12 PROCEDURE — 94762 N-INVAS EAR/PLS OXIMTRY CONT: CPT

## 2021-01-12 PROCEDURE — 94640 AIRWAY INHALATION TREATMENT: CPT

## 2021-01-12 PROCEDURE — 74011250636 HC RX REV CODE- 250/636

## 2021-01-12 PROCEDURE — 84145 PROCALCITONIN (PCT): CPT

## 2021-01-12 PROCEDURE — 85379 FIBRIN DEGRADATION QUANT: CPT

## 2021-01-12 PROCEDURE — 74011000250 HC RX REV CODE- 250: Performed by: INTERNAL MEDICINE

## 2021-01-12 PROCEDURE — 74011250637 HC RX REV CODE- 250/637: Performed by: INTERNAL MEDICINE

## 2021-01-12 PROCEDURE — 82962 GLUCOSE BLOOD TEST: CPT

## 2021-01-12 PROCEDURE — 84100 ASSAY OF PHOSPHORUS: CPT

## 2021-01-12 PROCEDURE — 77030021668 HC NEB PREFIL KT VYRM -A

## 2021-01-12 PROCEDURE — 77010033711 HC HIGH FLOW OXYGEN

## 2021-01-12 PROCEDURE — 99232 SBSQ HOSP IP/OBS MODERATE 35: CPT | Performed by: INTERNAL MEDICINE

## 2021-01-12 PROCEDURE — 83735 ASSAY OF MAGNESIUM: CPT

## 2021-01-12 RX ORDER — POTASSIUM CHLORIDE 14.9 MG/ML
20 INJECTION INTRAVENOUS
Status: COMPLETED | OUTPATIENT
Start: 2021-01-12 | End: 2021-01-12

## 2021-01-12 RX ORDER — DEXTROSE 50 % IN WATER (D50W) INTRAVENOUS SYRINGE
25-50 AS NEEDED
Status: DISCONTINUED | OUTPATIENT
Start: 2021-01-12 | End: 2021-01-16 | Stop reason: HOSPADM

## 2021-01-12 RX ORDER — SODIUM CHLORIDE 9 MG/ML
50 INJECTION, SOLUTION INTRAVENOUS EVERY 24 HOURS
Status: DISCONTINUED | OUTPATIENT
Start: 2021-01-12 | End: 2021-01-16 | Stop reason: HOSPADM

## 2021-01-12 RX ORDER — INSULIN LISPRO 100 [IU]/ML
0-10 INJECTION, SOLUTION INTRAVENOUS; SUBCUTANEOUS
Status: DISCONTINUED | OUTPATIENT
Start: 2021-01-12 | End: 2021-01-16 | Stop reason: HOSPADM

## 2021-01-12 RX ORDER — DEXAMETHASONE SODIUM PHOSPHATE 100 MG/10ML
6 INJECTION INTRAMUSCULAR; INTRAVENOUS EVERY 8 HOURS
Status: DISCONTINUED | OUTPATIENT
Start: 2021-01-12 | End: 2021-01-16 | Stop reason: HOSPADM

## 2021-01-12 RX ORDER — ENOXAPARIN SODIUM 100 MG/ML
30 INJECTION SUBCUTANEOUS EVERY 12 HOURS
Status: DISCONTINUED | OUTPATIENT
Start: 2021-01-12 | End: 2021-01-16 | Stop reason: HOSPADM

## 2021-01-12 RX ORDER — MAGNESIUM SULFATE HEPTAHYDRATE 40 MG/ML
2 INJECTION, SOLUTION INTRAVENOUS ONCE
Status: COMPLETED | OUTPATIENT
Start: 2021-01-12 | End: 2021-01-12

## 2021-01-12 RX ORDER — FUROSEMIDE 10 MG/ML
20 INJECTION INTRAMUSCULAR; INTRAVENOUS ONCE
Status: COMPLETED | OUTPATIENT
Start: 2021-01-12 | End: 2021-01-12

## 2021-01-12 RX ORDER — DEXTROSE 40 %
15 GEL (GRAM) ORAL AS NEEDED
Status: DISCONTINUED | OUTPATIENT
Start: 2021-01-12 | End: 2021-01-16 | Stop reason: HOSPADM

## 2021-01-12 RX ORDER — FUROSEMIDE 10 MG/ML
INJECTION INTRAMUSCULAR; INTRAVENOUS
Status: COMPLETED
Start: 2021-01-12 | End: 2021-01-12

## 2021-01-12 RX ORDER — ALBUTEROL SULFATE 90 UG/1
4 AEROSOL, METERED RESPIRATORY (INHALATION)
Status: DISCONTINUED | OUTPATIENT
Start: 2021-01-12 | End: 2021-01-13

## 2021-01-12 RX ORDER — FUROSEMIDE 10 MG/ML
20 INJECTION INTRAMUSCULAR; INTRAVENOUS EVERY 12 HOURS
Status: DISCONTINUED | OUTPATIENT
Start: 2021-01-12 | End: 2021-01-16 | Stop reason: HOSPADM

## 2021-01-12 RX ADMIN — DEXAMETHASONE SODIUM PHOSPHATE 6 MG: 10 INJECTION INTRAMUSCULAR; INTRAVENOUS at 21:30

## 2021-01-12 RX ADMIN — Medication 1000 MG: at 22:00

## 2021-01-12 RX ADMIN — ACETAMINOPHEN 1000 MG: 500 TABLET, FILM COATED ORAL at 20:58

## 2021-01-12 RX ADMIN — POLYETHYLENE GLYCOL 3350 17 G: 17 POWDER, FOR SOLUTION ORAL at 08:34

## 2021-01-12 RX ADMIN — ALBUTEROL SULFATE 4 PUFF: 108 INHALANT RESPIRATORY (INHALATION) at 21:12

## 2021-01-12 RX ADMIN — NIFEDIPINE 20 MG: 10 CAPSULE ORAL at 08:34

## 2021-01-12 RX ADMIN — ENOXAPARIN SODIUM 30 MG: 30 INJECTION SUBCUTANEOUS at 09:02

## 2021-01-12 RX ADMIN — FUROSEMIDE 20 MG: 10 INJECTION, SOLUTION INTRAMUSCULAR; INTRAVENOUS at 09:00

## 2021-01-12 RX ADMIN — INSULIN LISPRO 4 UNITS: 100 INJECTION, SOLUTION INTRAVENOUS; SUBCUTANEOUS at 21:29

## 2021-01-12 RX ADMIN — DEXAMETHASONE SODIUM PHOSPHATE 6 MG: 10 INJECTION INTRAMUSCULAR; INTRAVENOUS at 08:59

## 2021-01-12 RX ADMIN — INSULIN LISPRO 4 UNITS: 100 INJECTION, SOLUTION INTRAVENOUS; SUBCUTANEOUS at 15:56

## 2021-01-12 RX ADMIN — ALBUTEROL SULFATE 4 PUFF: 108 INHALANT RESPIRATORY (INHALATION) at 15:14

## 2021-01-12 RX ADMIN — POTASSIUM CHLORIDE 20 MEQ: 14.9 INJECTION, SOLUTION INTRAVENOUS at 05:58

## 2021-01-12 RX ADMIN — FUROSEMIDE 20 MG: 10 INJECTION, SOLUTION INTRAMUSCULAR; INTRAVENOUS at 15:53

## 2021-01-12 RX ADMIN — DEXAMETHASONE SODIUM PHOSPHATE 6 MG: 10 INJECTION INTRAMUSCULAR; INTRAVENOUS at 13:18

## 2021-01-12 RX ADMIN — SODIUM CHLORIDE 50 ML: 900 INJECTION, SOLUTION INTRAVENOUS at 12:00

## 2021-01-12 RX ADMIN — POTASSIUM CHLORIDE 20 MEQ: 14.9 INJECTION, SOLUTION INTRAVENOUS at 04:01

## 2021-01-12 RX ADMIN — NIFEDIPINE 20 MG: 10 CAPSULE ORAL at 13:18

## 2021-01-12 RX ADMIN — Medication 220 MG: at 08:34

## 2021-01-12 RX ADMIN — DOXYCYCLINE 100 MG: 100 INJECTION, POWDER, LYOPHILIZED, FOR SOLUTION INTRAVENOUS at 01:22

## 2021-01-12 RX ADMIN — IBUPROFEN 800 MG: 800 TABLET ORAL at 05:08

## 2021-01-12 RX ADMIN — FUROSEMIDE 20 MG: 10 INJECTION, SOLUTION INTRAMUSCULAR; INTRAVENOUS at 02:26

## 2021-01-12 RX ADMIN — INSULIN LISPRO 2 UNITS: 100 INJECTION, SOLUTION INTRAVENOUS; SUBCUTANEOUS at 11:34

## 2021-01-12 RX ADMIN — DOCUSATE SODIUM 100 MG: 100 CAPSULE ORAL at 08:34

## 2021-01-12 RX ADMIN — ENOXAPARIN SODIUM 30 MG: 30 INJECTION SUBCUTANEOUS at 20:58

## 2021-01-12 RX ADMIN — REMDESIVIR 200 MG: 100 INJECTION, POWDER, LYOPHILIZED, FOR SOLUTION INTRAVENOUS at 11:32

## 2021-01-12 RX ADMIN — DOXYCYCLINE 100 MG: 100 INJECTION, POWDER, LYOPHILIZED, FOR SOLUTION INTRAVENOUS at 12:35

## 2021-01-12 RX ADMIN — Medication 1000 MG: at 15:54

## 2021-01-12 RX ADMIN — FUROSEMIDE 20 MG: 10 INJECTION, SOLUTION INTRAMUSCULAR; INTRAVENOUS at 20:57

## 2021-01-12 RX ADMIN — MAGNESIUM SULFATE HEPTAHYDRATE 2 G: 40 INJECTION, SOLUTION INTRAVENOUS at 02:26

## 2021-01-12 RX ADMIN — ALBUTEROL SULFATE 2 PUFF: 108 AEROSOL, METERED RESPIRATORY (INHALATION) at 08:23

## 2021-01-12 RX ADMIN — Medication 1000 MG: at 08:34

## 2021-01-12 RX ADMIN — CEFTRIAXONE SODIUM 2 G: 2 INJECTION, POWDER, FOR SOLUTION INTRAMUSCULAR; INTRAVENOUS at 12:35

## 2021-01-12 RX ADMIN — POTASSIUM CHLORIDE 20 MEQ: 14.9 INJECTION, SOLUTION INTRAVENOUS at 08:36

## 2021-01-12 NOTE — PROGRESS NOTES
Incomplete                      Charting for Fr. Anika Song who assisted pt with completing a [address] Pt named her fiance/boyfriend, as her agent. A copy was given to the unit secretary on 6th floor to place on patient's chart. *In addition to naming Mr. Mariano Govea as her health care agent, pt also stated that she would like Mr. Fatima to make any health care decisions for their child, Marie Borrero, in the event that pt is not able.     Edward Kussmaul, MDiv, [facility]

## 2021-01-12 NOTE — PROGRESS NOTES
Patient has tachycardia in the 120s. Went and examined the patient. As per patient, she always had high heart rate in 120s to 130s as far as she can remember. She is on currently high flow with nonrebreather. JVD on examination with bilateral crackles and minimal lower extremity edema. We will give her Lasix 20 mg IV. Lab ordered. Magnesium and potassium IV replacement ordered. Nurse to notify with any clinical worsening.   Respiratory therapist Arianne and will give her ipratropium inhaler given tachycardia

## 2021-01-12 NOTE — PROGRESS NOTES
Charting on behalf of Fr. Anika Song who followed isolation precautions and utilized iPad to connect with [employer] at [place]. Pt was able to talk to pediatrician and see her baby, who is in the special care nursery at [place].

## 2021-01-12 NOTE — PROGRESS NOTES
Pt heart rate increased tonight to150's. Lopressor given per [facility] and was effective and brought heart rate down to 120's. Patients oxygen has been maxed out on high flow barely maintaining 90% and is now dropping to 80%. Respiratory is working with pt and putting either airvo or nonrebreathing to maintain sats above 92%. Requested order for continuous pulse oximetry.

## 2021-01-12 NOTE — PROGRESS NOTES
Maternal-Fetal Medicine Progress Note. Chart reviewed Yesterday and Today. Patient seen, much better spirits today. No complaints of abdominal or inscisional pain. No significant vaginal bleeding. OB Recommendations  1) Severe Preeclampsia Superimposed on Chronic HTN-May need antihypertensive agents due vascular changes of PreE. May also have increased Capillary leak in form of swelling and Pulmonary edema, may benefit from diuresis.  -Can receive all appropriate treatments for her Covid-19 Pneumonia.   -Conitue to have patient pump breast milk and dispose of milk, want there to be able to breastfeed baby after discharge.  -Can be anticoagulated.     Dr. Aminta De La Garza    Time on Unit-35 minutes

## 2021-01-12 NOTE — PROGRESS NOTES
Hospitalist Progress Note    2021  Admit Date: 1/10/2021  3:48 PM   NAME: Elenora Harada   :  1985   MRN:  [id number]   Attending: Dewayne Kaye DO  PCP:  Ole, MD Shekhar    SUBJECTIVE:    Ms. Rich Carrillo is a 36 yo female with [address] since 21 admitted to [facility] s/p CSection on 21 with delivery of female baby. She has been on room air until 1-10-21. CXR shows bilateral infiltrates. CTA chest no PE though bilateral infiltrates.      Transferred to [facility] from [facility] d/t covid status      - reports depression, lack of appetite. Remains on high flow oxygen 10-12 L HF       Review of Systems negative with exception of pertinent positives noted above  PHYSICAL EXAM     Visit Vitals  /78   Pulse (!) 116   Temp 98.6 °F (37 °C)   Resp 20   LMP 2020   SpO2 100%      Temp (24hrs), Av.6 °F (37 °C), Min:98.1 °F (36.7 °C), Max:99.1 °F (37.3 °C)    Oxygen Therapy  O2 Sat (%): 100 % (21)  Pulse via Oximetry: 130 beats per minute (21)  O2 Device: Hi flow nasal cannula (21)  O2 Flow Rate (L/min): 21 l/min (21)    Intake/Output Summary (Last 24 hours) at 2021 2322  Last data filed at 2021 1818  Gross per 24 hour   Intake 660 ml   Output 200 ml   Net 460 ml      General: No acute distress    Lungs:  CTA Bilaterally.    Heart:  Regular rate and rhythm,  No murmur, rub, or gallop  Abdomen: Soft, Non distended, Non tender, Positive bowel sounds  Extremities: No cyanosis, clubbing or edema  Neurologic:  No focal deficits    ASSESSMENT      Active Hospital Problems    Diagnosis Date Noted    Tachycardia 2021    H/O  section 2021    SOB (shortness of breath) 2021    Pneumonia due to COVID-19 virus 2021    COVID-19 affecting pregnancy in third trimester 2021     Sx 21  Positive 21  Precautions through 21       A/P  # Covid Pneumonia  - continue empiric CAP coverage  - cont Decadron EOT   - CT neg for PE  - conv plasma 1/11  - declines remdesivir    # Preeclampisa  - continue procardia - holding for SBP <120    # sinus tachycardia  - Echo 1/11 nml EF  - cardiology following    # post-partum - s/p cscetion'  - px well controlled  - advised to continue pumping as able    DVT Prophylaxis: scds    Signed By: Susana Campos DO     January 11, 2021

## 2021-01-12 NOTE — PROGRESS NOTES
Chart screened by  for discharge planning. Patient currently requiring 60L via AirVo at 100% and the use of a non-rebreather. Patient is 3 postpartum at [facility] and her baby is in the NICU at [facility]. Patient is not  to babies father Andi Bustamante however, the baby will have his last name. CM reached out to DSS in the event the mother were to pass, we want to make sure her wishes are known for the care of the child. DSS worker Carlos Manuel lewis informed CM as long as the father is stating the child is his and has his last name the child would go to the father. In the event both parents were  then DSS would need to be notified. Please consult  if any new issues arise.

## 2021-01-12 NOTE — PROGRESS NOTES
am  1/12/2021 7:16 AM    Admit Date: 1/10/2021    Admit Diagnosis: Pneumonia due to COVID-19 virus [U07.1, J12.82]      Subjective:   Oxygen requirement has increased. Currently on hi flow nasal canula; no rebreather mask at 50 l/min satting around 91-94%.      Objective:      Visit Vitals  /86 (BP 1 Location: Right arm)   Pulse (!) 116   Temp 99.4 °F (37.4 °C)   Resp 28   LMP 05/22/2020   SpO2 94%       ROS:  General ROS: negative for - chills  Hematological and Lymphatic ROS: negative for - blood clots or jaundice  Respiratory ROS: positive for - shortness of breath  Cardiovascular ROS: no chest pain or dyspnea on exertion  Gastrointestinal ROS: no abdominal pain, change in bowel habits, or black or bloody stools  Neurological ROS: no TIA or stroke symptoms    Physical Exam:    Physical Examination: General appearance - alert, well appearing, and in no distress  Mental status - alert, oriented to person, place, and time  Eyes - pupils equal and reactive, extraocular eye movements intact  Neck/lymph - supple, no significant adenopathy  Chest/CV - not examined  Heart - normal rate and regular rhythm  Abdomen/GI - soft, nontender, nondistended, no masses or organomegaly  Musculoskeletal - no joint tenderness, deformity or swelling  Extremities - peripheral pulses normal, no pedal edema, no clubbing or cyanosis  Skin - normal coloration and turgor, no rashes, no suspicious skin lesions noted    Current Facility-Administered Medications   Medication Dose Route Frequency    doxycycline (VIBRAMYCIN) 100 mg in 0.9% sodium chloride (MBP/ADV) 100 mL MBP  100 mg IntraVENous Q12H    potassium chloride 20 mEq in 100 ml IVPB  20 mEq IntraVENous Q2H    acetaminophen (TYLENOL) tablet 1,000 mg  1,000 mg Oral Q6H PRN    diphenhydrAMINE (BENADRYL) injection 12.5 mg  12.5 mg IntraVENous Q6H PRN    promethazine (PHENERGAN) with saline injection 6.25 mg  6.25 mg IntraVENous Q6H PRN    simethicone (MYLICON) tablet 80 mg  80 mg Oral AC&HS    0.9% sodium chloride infusion 250 mL  250 mL IntraVENous PRN    albuterol (PROVENTIL HFA, VENTOLIN HFA, PROAIR HFA) inhaler 2 Puff  2 Puff Inhalation Q6H RT    ascorbic acid (vitamin C) (VITAMIN C) tablet 1,000 mg  1,000 mg Oral TID    cefTRIAXone (ROCEPHIN) 2 g in 0.9% sodium chloride (MBP/ADV) 50 mL MBP  2 g IntraVENous Q24H    dexamethasone (DECADRON) 10 mg/mL injection 6 mg  6 mg IntraVENous Q24H    docusate sodium (COLACE) capsule 100 mg  100 mg Oral BID    ibuprofen (MOTRIN) tablet 800 mg  800 mg Oral Q6H PRN    NIFEdipine (PROCARDIA) capsule 20 mg  20 mg Oral Q6H    polyethylene glycol (MIRALAX) packet 17 g  17 g Oral DAILY    zinc sulfate tablet 220 mg  220 mg Oral DAILY    metoprolol (LOPRESSOR) injection 5 mg  5 mg IntraVENous Q6H PRN       Data Review:   @LABRCNT(Na,K,BUN,CREA,WBC,HGB,HCT,PLT,INR,TRP,TCHOL*,Triglyceride*,LDL*,LDLCPOC HDL*,HDL])@    TELEMETRY:    Assessment/Plan:     Principal Problem:    COVID-19 affecting pregnancy in third trimester (2021)      Overview: Sx 21      Positive 21      Precautions through 21    Active Problems:    Tachycardia (2021)  - pt noted her hr has always been elevated per medicine team; mild anemia, electrolyte imbalance, and current covid infection could contribute  - EKG shows sinus tachycardia; echo shows normal systolic function w/ EF of 60-65 %  Plan  - treat underlying cause      H/O  section (2021)      SOB (shortness of breath) (2021)      Pneumonia due to COVID-19 virus (2021)    No active cardiac issues.        Saud Bustamante

## 2021-01-12 NOTE — PROGRESS NOTES
Hourly rounding completed on this shift. No new complaints at this time. All needs met. Pt is currently resting in recliner. O2 sats maintaining at 97% . Pt states, \"I feel fine and I would like to move around. Will continue to monitor and give report to oncoming nurse. Clear bilaterally, pupils equal, round and reactive to light.

## 2021-01-12 NOTE — ACP (ADVANCE CARE PLANNING)
Charting for Fr. Kevin Moore who assisted pt with completing a [address] Pt named her fiance/boyfriend, as her agent. A copy was given to the unit secretary on 6th floor to place on patient's chart. Original returned to patient. *In addition to naming Mr. Ketty Mcbride as her health care agent, pt also stated that she would like for Mr. Fatima to make any health care decisions for their child, Tessy Hermosillo, in the event that pt is not able.       Kody Min MDiv, [address] [place] [address]

## 2021-01-12 NOTE — PROGRESS NOTES
PRN pain medication administered x2 for soreness at lower incisional site. Incision is glued and has no drainage. Oxygen demand has increased through out this shift. Pt resting in bed at this time, denies pain or needs. Bed in low position and call light/ personal items within reach. Will continue to monitor and give bedside shift report to oncoming day shift nurse.

## 2021-01-12 NOTE — CONSULTS
[facility] Critical Care COVID-19 Note:    Critical Care Note: 1/12/2021    Juan Ramirez  Admission Date: 1/10/2021     Length of Stay: 2 days    Background: 31 y.o. y/o female with acute hypoxemic respiratory failure secondary to COVID-19. Date of COVID-19 symptom onset:    Notable PMH: CSection on 1-9-21 with delivery of female baby, Pre-E  Drug Allergies: No Known Allergies    HPI:  Ms. Tate Rodriguez is a 36 yo female with PMH of [place] since 1-7-21 admitted to [facility] s/p CSection on 1-9-21 with delivery of female baby. She has been on room air until 1-10-21. CXR shows bilateral infiltrates. CTA chest no PE though bilateral infiltrates.      Transferred to [facility] from [facility] d/t covid status     1/11 - reports depression, lack of appetite. Remains on high flow oxygen 10-12 L HF  1/12 - increased to AirVo with NRB. Given lasix x 1 this morning, started on Lovenox. She states she feels better and wants to walk around though is on 100% AirVo with NRB and sat 94%. She states other than the low oxygen she thinks she feels better than this morning or yesterday. She denies pain. She hasn't noted any chest congestion or cough. REVIEW OF SYSTEMS:  Constitutional:  no fever, No chills, night sweats, weight loss, weight gain  Eyes:  Denies problems with eye pain, erythema, blurred vision, or visual field loss. ENTM:  Denies sore throat, no loss of taste or smell  Lymph:  Denies swollen glands. Cardiac:  No chest pain, pressure, discomfort, palpitations, orthopnea, murmurs, or edema. GI:  No dysphagia, heartburn reflux, nausea/vomiting, diarrhea, abdominal pain, or bleeding. :Denies history of dysuria, hematuria, polyuria, or decreased urine output. MS:  No history of myalgias, arthralgias, bone pain, or muscle cramps. Skin:  No history of rashes, jaundice, cyanosis, nodules, or ulcers. Endo:  Negative for heat or cold intolerance.   No polyuria/polydipsia  Psych:  +anxiety, No depression, insomnia, hallucinations. Neuro: There is no history of AMS, persistent headache, decreased level of consciousness, seizures, or motor or sensory deficits. Visit Vitals  /78 (BP 1 Location: Right arm, BP Patient Position: At rest)   Pulse (!) 127   Temp 98.9 °F (37.2 °C)   Resp (!) 33   SpO2 97%     I/O:    Intake/Output Summary (Last 24 hours) at 1/12/2021 1306  Last data filed at 1/12/2021 0508  Gross per 24 hour   Intake 833 ml   Output 1100 ml   Net -267 ml      Pertinent Exam:            Constitutional: awake, alert, minimally increasedwork of breathing. EENMT:  Sclera clear, pupils equal, oral mucosa moist  Respiratory: crackles bilateral  Cardiovascular:  RRR with no M,G,R;  Gastrointestinal:  soft with no tenderness; positive bowel sounds present  Musculoskeletal:  warm with no cyanosis, no lower extremity edema  Skin:  no jaundice or ecchymosis  Neurologic: no gross neuro deficits     Psychiatric: calm, anxious to see child. CXR: bilateral infiltrates, somewhat better perhaps than prior      Lines (insertion date):    PIV  External catheter    Drips: current dose (range)  None    COVID-19 Meds:  Dexamethasone 6mg daily (1/11)  Remdesivir (1/12; 5-10 days)  Convalescent plasma transfused 1/11    SARS-CoV-2 LAB Results    Microbiology Results  Date  Source Culture Result   1/921 Blood x 2 NGTD     Anti-infectives (start date):   Rocephin - 1/11 -   Doxy - 1/11 -    Pertinent Labs:   Recent Labs     01/12/21  0707 01/12/21  0105 01/11/21  0400 01/10/21  1049   WBC 14.0* 13.6* 12.2* 10.8   HGB 11.2* 11.1* 9.7* 9.9*   HCT 35.5* 35.1* 30.4* 31.5*    280 207 210     Recent Labs     01/12/21  0707 01/12/21  0105 01/11/21  0400    140 139   K 3.7 3.5 3.1*    108* 106   CO2 26 26 26   * 134* 132*   BUN 8 7 8   CREA 0.50* 0.46* 0.46*   MG  --  1.5*  --    CA 8.8 8.4 8.2*   PHOS  --  3.7  --    ALB 2.1* 2.0* 2.0*   TBILI 0.3 0.2 0.4   ALT 26 25 24     Recent Labs     01/12/21  1057 21  0728 21  1506   GLUCPOC 173* 108* 229*       Oxygen Requirements:  Date O2 support mode FiO2 SaO2           IMPRESSION:  Active Hospital Problems    Diagnosis Date Noted    Tachycardia 2021    H/O  section 2021    SOB (shortness of breath) 2021    Pneumonia due to COVID-19 virus 2021    COVID-19 affecting pregnancy in third trimester 2021     Sx 21  Positive 21  Precautions through 21         31 y.o. y/o female with acute hypoxemic respiratory failure secondary to COVID-19. PLAN:  1. Acute respiratory failure with hypoxia: supportive measures with noninvasive oxygen strategy unless tiring or desaturation. She may have responded to Lasix and with recent Pre-E some of her infiltrates could be pulmonary edema. Would be more aggressive about diuresis and wean O2 as tolerated. Will schedule lasix IV and monitor labs. Will check CRP and BNP to see how much inflammation may be present. Dimer was 2.2 and PCT was 1.5 so agree with steroids and anticoagulation doses. She is on continuous pulse oximetry and given alertness and apparent stability I think this is reasonable for now. Obviously, in ideal times I would move her to the ICU, but with surge COVID numbers there is no availability at this point. 2. COVID-19 pneumonia:  Decadron, Convalescent plasma, remdesivir as outlined above. 3. Nutrition: eat as tolerated  4. PPx:  DVT ppx ongoing. H2 blocker with decadron. Full Code    The patient is critically ill with respiratory failure and requires high complexity decision making for assessment and support including frequent evaluation and titration of therapies, application of advanced monitoring technologies & timing & modality of advanced respiratory support.   Cumulative time devoted to patient care services by me for day of service -Evan Walker MD

## 2021-01-13 PROBLEM — J96.01 ACUTE RESPIRATORY FAILURE WITH HYPOXIA (HCC): Status: ACTIVE | Noted: 2021-01-13

## 2021-01-13 LAB
ALBUMIN SERPL-MCNC: 2.1 G/DL (ref 3.5–5)
ALBUMIN/GLOB SERPL: 0.4 {RATIO} (ref 1.2–3.5)
ALP SERPL-CCNC: 121 U/L (ref 50–136)
ALT SERPL-CCNC: 25 U/L (ref 12–65)
ANION GAP SERPL CALC-SCNC: 9 MMOL/L (ref 7–16)
AST SERPL-CCNC: 54 U/L (ref 15–37)
BASOPHILS # BLD: 0 K/UL (ref 0–0.2)
BASOPHILS NFR BLD: 0 % (ref 0–2)
BILIRUB SERPL-MCNC: 0.4 MG/DL (ref 0.2–1.1)
BUN SERPL-MCNC: 19 MG/DL (ref 6–23)
CALCIUM SERPL-MCNC: 9.2 MG/DL (ref 8.3–10.4)
CHLORIDE SERPL-SCNC: 107 MMOL/L (ref 98–107)
CO2 SERPL-SCNC: 25 MMOL/L (ref 21–32)
CREAT SERPL-MCNC: 0.59 MG/DL (ref 0.6–1)
D DIMER PPP FEU-MCNC: 5.34 UG/ML(FEU)
DIFFERENTIAL METHOD BLD: ABNORMAL
EOSINOPHIL # BLD: 0 K/UL (ref 0–0.8)
EOSINOPHIL NFR BLD: 0 % (ref 0.5–7.8)
ERYTHROCYTE [DISTWIDTH] IN BLOOD BY AUTOMATED COUNT: 20.1 % (ref 11.9–14.6)
GLOBULIN SER CALC-MCNC: 4.8 G/DL (ref 2.3–3.5)
GLUCOSE BLD STRIP.AUTO-MCNC: 137 MG/DL (ref 65–100)
GLUCOSE BLD STRIP.AUTO-MCNC: 148 MG/DL (ref 65–100)
GLUCOSE BLD STRIP.AUTO-MCNC: 157 MG/DL (ref 65–100)
GLUCOSE BLD STRIP.AUTO-MCNC: 195 MG/DL (ref 65–100)
GLUCOSE BLD STRIP.AUTO-MCNC: 209 MG/DL (ref 65–100)
GLUCOSE BLD STRIP.AUTO-MCNC: 212 MG/DL (ref 65–100)
GLUCOSE SERPL-MCNC: 165 MG/DL (ref 65–100)
HCT VFR BLD AUTO: 34.6 % (ref 35.8–46.3)
HGB BLD-MCNC: 11.1 G/DL (ref 11.7–15.4)
IMM GRANULOCYTES # BLD AUTO: 0.4 K/UL (ref 0–0.5)
IMM GRANULOCYTES NFR BLD AUTO: 3 % (ref 0–5)
LYMPHOCYTES # BLD: 2.3 K/UL (ref 0.5–4.6)
LYMPHOCYTES NFR BLD: 16 % (ref 13–44)
MCH RBC QN AUTO: 25 PG (ref 26.1–32.9)
MCHC RBC AUTO-ENTMCNC: 32.1 G/DL (ref 31.4–35)
MCV RBC AUTO: 77.9 FL (ref 79.6–97.8)
MONOCYTES # BLD: 0.4 K/UL (ref 0.1–1.3)
MONOCYTES NFR BLD: 3 % (ref 4–12)
NEUTS SEG # BLD: 10.9 K/UL (ref 1.7–8.2)
NEUTS SEG NFR BLD: 77 % (ref 43–78)
NRBC # BLD: 0.07 K/UL (ref 0–0.2)
PLATELET # BLD AUTO: 377 K/UL (ref 150–450)
PMV BLD AUTO: 10 FL (ref 9.4–12.3)
POTASSIUM SERPL-SCNC: 3.6 MMOL/L (ref 3.5–5.1)
PROT SERPL-MCNC: 6.9 G/DL (ref 6.3–8.2)
RBC # BLD AUTO: 4.44 M/UL (ref 4.05–5.2)
SODIUM SERPL-SCNC: 141 MMOL/L (ref 136–145)
WBC # BLD AUTO: 14.2 K/UL (ref 4.3–11.1)

## 2021-01-13 PROCEDURE — 65270000029 HC RM PRIVATE

## 2021-01-13 PROCEDURE — 77010033711 HC HIGH FLOW OXYGEN

## 2021-01-13 PROCEDURE — 85379 FIBRIN DEGRADATION QUANT: CPT

## 2021-01-13 PROCEDURE — 80053 COMPREHEN METABOLIC PANEL: CPT

## 2021-01-13 PROCEDURE — 74011250636 HC RX REV CODE- 250/636: Performed by: INTERNAL MEDICINE

## 2021-01-13 PROCEDURE — 74011000258 HC RX REV CODE- 258: Performed by: INTERNAL MEDICINE

## 2021-01-13 PROCEDURE — 74011250637 HC RX REV CODE- 250/637: Performed by: INTERNAL MEDICINE

## 2021-01-13 PROCEDURE — 94640 AIRWAY INHALATION TREATMENT: CPT

## 2021-01-13 PROCEDURE — 99232 SBSQ HOSP IP/OBS MODERATE 35: CPT | Performed by: INTERNAL MEDICINE

## 2021-01-13 PROCEDURE — 85025 COMPLETE CBC W/AUTO DIFF WBC: CPT

## 2021-01-13 PROCEDURE — 94762 N-INVAS EAR/PLS OXIMTRY CONT: CPT

## 2021-01-13 PROCEDURE — 74011000250 HC RX REV CODE- 250: Performed by: INTERNAL MEDICINE

## 2021-01-13 PROCEDURE — 74011636637 HC RX REV CODE- 636/637: Performed by: INTERNAL MEDICINE

## 2021-01-13 RX ORDER — ALBUTEROL SULFATE 90 UG/1
4 AEROSOL, METERED RESPIRATORY (INHALATION)
Status: DISCONTINUED | OUTPATIENT
Start: 2021-01-13 | End: 2021-01-16 | Stop reason: HOSPADM

## 2021-01-13 RX ORDER — ALBUTEROL SULFATE 90 UG/1
2 AEROSOL, METERED RESPIRATORY (INHALATION)
Status: DISCONTINUED | OUTPATIENT
Start: 2021-01-13 | End: 2021-01-16 | Stop reason: HOSPADM

## 2021-01-13 RX ADMIN — ALBUTEROL SULFATE 2 PUFF: 108 INHALANT RESPIRATORY (INHALATION) at 16:00

## 2021-01-13 RX ADMIN — INSULIN LISPRO 2 UNITS: 100 INJECTION, SOLUTION INTRAVENOUS; SUBCUTANEOUS at 11:57

## 2021-01-13 RX ADMIN — NIFEDIPINE 20 MG: 10 CAPSULE ORAL at 09:03

## 2021-01-13 RX ADMIN — DOXYCYCLINE 100 MG: 100 INJECTION, POWDER, LYOPHILIZED, FOR SOLUTION INTRAVENOUS at 00:41

## 2021-01-13 RX ADMIN — FUROSEMIDE 20 MG: 10 INJECTION, SOLUTION INTRAMUSCULAR; INTRAVENOUS at 09:04

## 2021-01-13 RX ADMIN — ALBUTEROL SULFATE 2 PUFF: 108 INHALANT RESPIRATORY (INHALATION) at 22:36

## 2021-01-13 RX ADMIN — Medication 1000 MG: at 20:09

## 2021-01-13 RX ADMIN — CEFTRIAXONE SODIUM 2 G: 2 INJECTION, POWDER, FOR SOLUTION INTRAMUSCULAR; INTRAVENOUS at 12:54

## 2021-01-13 RX ADMIN — IBUPROFEN 800 MG: 800 TABLET ORAL at 19:42

## 2021-01-13 RX ADMIN — Medication 1000 MG: at 15:10

## 2021-01-13 RX ADMIN — NIFEDIPINE 20 MG: 10 CAPSULE ORAL at 13:02

## 2021-01-13 RX ADMIN — INSULIN LISPRO 2 UNITS: 100 INJECTION, SOLUTION INTRAVENOUS; SUBCUTANEOUS at 16:56

## 2021-01-13 RX ADMIN — ALBUTEROL SULFATE 4 PUFF: 108 INHALANT RESPIRATORY (INHALATION) at 08:18

## 2021-01-13 RX ADMIN — ALBUTEROL SULFATE 4 PUFF: 108 INHALANT RESPIRATORY (INHALATION) at 02:00

## 2021-01-13 RX ADMIN — NIFEDIPINE 20 MG: 10 CAPSULE ORAL at 02:21

## 2021-01-13 RX ADMIN — NIFEDIPINE 20 MG: 10 CAPSULE ORAL at 20:08

## 2021-01-13 RX ADMIN — Medication 220 MG: at 09:04

## 2021-01-13 RX ADMIN — FUROSEMIDE 20 MG: 10 INJECTION, SOLUTION INTRAMUSCULAR; INTRAVENOUS at 20:08

## 2021-01-13 RX ADMIN — REMDESIVIR 100 MG: 100 INJECTION, POWDER, LYOPHILIZED, FOR SOLUTION INTRAVENOUS at 11:58

## 2021-01-13 RX ADMIN — DEXAMETHASONE SODIUM PHOSPHATE 6 MG: 10 INJECTION INTRAMUSCULAR; INTRAVENOUS at 12:57

## 2021-01-13 RX ADMIN — ENOXAPARIN SODIUM 30 MG: 30 INJECTION SUBCUTANEOUS at 09:03

## 2021-01-13 RX ADMIN — ENOXAPARIN SODIUM 30 MG: 30 INJECTION SUBCUTANEOUS at 20:10

## 2021-01-13 RX ADMIN — DEXAMETHASONE SODIUM PHOSPHATE 6 MG: 10 INJECTION INTRAMUSCULAR; INTRAVENOUS at 21:00

## 2021-01-13 RX ADMIN — Medication 1000 MG: at 09:03

## 2021-01-13 RX ADMIN — DEXAMETHASONE SODIUM PHOSPHATE 6 MG: 10 INJECTION INTRAMUSCULAR; INTRAVENOUS at 05:31

## 2021-01-13 RX ADMIN — DOXYCYCLINE 100 MG: 100 INJECTION, POWDER, LYOPHILIZED, FOR SOLUTION INTRAVENOUS at 14:46

## 2021-01-13 NOTE — PROGRESS NOTES
Copied from consult note today, note is progress note. Not consult. [facility] Critical Care COVID-19 Note:    Critical Care Note: 1/13/2021    Zeus Jauregui  Admission Date: 1/10/2021     Length of Stay: 3 days    Background: 31 y.o. y/o female with acute hypoxemic respiratory failure secondary to COVID-19. Date of COVID-19 symptom onset: 1/7/21    Notable PMH: CSection on 1-9-21 with delivery of female baby, Pre-E  Drug Allergies: No Known Allergies    HPI:  Ms. Carlos Manuel Kang is a 38 yo female with PMH of [place] since 1-7-21 admitted to [facility] s/p CSection on 1-9-21 with delivery of female baby. She has been on room air until 1-10-21. CXR shows bilateral infiltrates. CTA chest no PE though bilateral infiltrates.      Transferred to [facility] from [facility] d/t covid status     1/11 - reports depression, lack of appetite. Remains on high flow oxygen 10-12 L HF  1/12 - increased to AirVo with NRB. Given lasix x 1 this morning, started on Lovenox. She states she feels better and wants to walk around though is on 100% AirVo with NRB and sat 94%. She states other than the low oxygen she thinks she feels better than this morning or yesterday. She denies pain. She hasn't noted any chest congestion or cough. 1/13 - feeling better today. Has taken NRB mask off, remains with Airvo, O2 sat remains 100%, so weaned airvo to 60L. 90% and O2 sat remains %. REVIEW OF SYSTEMS:  Constitutional:  no fever, No chills, night sweats, weight loss, weight gain  Eyes:  Denies problems with eye pain, erythema, blurred vision, or visual field loss. ENTM:  Denies sore throat, no loss of taste or smell  Lymph:  Denies swollen glands. Cardiac:  No chest pain, pressure, discomfort, palpitations, orthopnea, murmurs, or edema. GI:  No dysphagia, heartburn reflux, nausea/vomiting, diarrhea, abdominal pain, or bleeding. :Denies history of dysuria, hematuria, polyuria, or decreased urine output.   MS:  No history of myalgias, arthralgias, bone pain, or muscle cramps. Skin:  No history of rashes, jaundice, cyanosis, nodules, or ulcers. Endo:  Negative for heat or cold intolerance. No polyuria/polydipsia  Psych:  +anxiety, No depression, insomnia, hallucinations. Neuro: There is no history of AMS, persistent headache, decreased level of consciousness, seizures, or motor or sensory deficits. Visit Vitals  BP (!) 131/93 (BP 1 Location: Right arm, BP Patient Position: At rest)   Pulse (!) 109   Temp 98 °F (36.7 °C)   Resp 20   SpO2 97%     I/O:    Intake/Output Summary (Last 24 hours) at 1/13/2021 0957  Last data filed at 1/12/2021 2304  Gross per 24 hour   Intake 240 ml   Output 2700 ml   Net -2460 ml      Pertinent Exam:            Constitutional: awake, alert, appears comfortable  EENMT:  Sclera clear, pupils equal, oral mucosa moist  Respiratory: faint crackles bilaterally  Cardiovascular:  RRR with no M,G,R;  Gastrointestinal:  soft with no tenderness; positive bowel sounds present  Musculoskeletal:  warm with no cyanosis, no lower extremity edema  Skin:  no jaundice or ecchymosis  Neurologic: no gross neuro deficits     Psychiatric: calm, anxious to see child. CXR:   1/13/21  none    1/12/21  bilateral infiltrates, somewhat better perhaps than prior      Lines (insertion date):    PIV  External catheter    Drips: current dose (range)  None    COVID-19 Meds:  Dexamethasone 6mg daily (1/11)  Remdesivir (1/12; 5-10 days)  Convalescent plasma transfused 1/11    SARS-CoV-2 LAB Results    Microbiology Results  Date  Source Culture Result   1/7/21 urine NGTD   1/9/21 Blood x 2 NGTD     Anti-infectives (start date):   Rocephin - 1/11 -   Doxy - 1/11 -    Pertinent Labs:   Recent Labs     01/13/21  0534 01/12/21  0707 01/12/21  0105 01/11/21  0400   WBC 14.2* 14.0* 13.6* 12.2*   HGB 11.1* 11.2* 11.1* 9.7*   HCT 34.6* 35.5* 35.1* 30.4*    315 280 207     Recent Labs     01/13/21  0534 01/12/21  0707 01/12/21  0105    138 140   K 3.6 3.7 3.5    106 108*   CO2 25 26 26   * 102* 134*   BUN 19 8 7   CREA 0.59* 0.50* 0.46*   MG  --   --  1.5*   CA 9.2 8.8 8.4   PHOS  --   --  3.7   ALB 2.1* 2.1* 2.0*   TBILI 0.4 0.3 0.2   ALT 25 26 25     Recent Labs     21  1458 21  1051   GLUCPOC 209* 212* 173*       Oxygen Requirements:  Date O2 support mode FiO2 SaO2   21 Airvo and NRB 60L/100% 97%     IMPRESSION:  Active Hospital Problems    Diagnosis Date Noted    Tachycardia 2021    H/O  section 2021    SOB (shortness of breath) 2021    Pneumonia due to COVID-19 virus 2021    COVID-19 affecting pregnancy in third trimester 2021     Sx 21  Positive 21  Precautions through 21         31 y.o. y/o female with acute hypoxemic respiratory failure secondary to COVID-19. PLAN:  1. Acute respiratory failure with hypoxia: supportive measures with noninvasive oxygen strategy unless tiring or desaturation. Wean as tolerated. Weaned from 60L/100% to 60L/90% and tolerated. NRB mask is not on, but within reach if needed. Responding to lasix so plan to continue for now. With recent Pre-E some of her infiltrates could be pulmonary edema. >>CRP 15.9 and pro- ()>>likley related to inflammatory process, cont  steroids   >> Dimer was 5.34 () and PCT was 1.37 () so agree with steroids and  anticoagulation doses. >> She is on continuous pulse oximetry and given alertness and apparent stability I think  this is reasonable for now. Obviously, in ideal times I would move her to the ICU, but with  surge COVID numbers there is no availability at this point. 2. COVID-19 pneumonia:  Decadron, Convalescent plasma, remdesivir as outlined above. 3. Nutrition: eat as tolerated  4. PPx:  DVT ppx ongoing. H2 blocker with decadron.      Full Code    The patient is critically ill with respiratory failure and requires high complexity decision making for assessment and support including frequent evaluation and titration of therapies, application of advanced monitoring technologies & timing & modality of advanced respiratory support. Cumulative time devoted to patient care services by me for day of service -39min     Elena Santillan, NP  Lungs:  Some minimal crackles in lung bases  Heart:  RRR with no Murmur/Rubs/Gallops    Additional Comments: On 60L, 90% airvo- sats are 99-can wean further    I have spoken with and examined the patient. I agree with the above assessment and plan as documented.     Samm Morley MD

## 2021-01-13 NOTE — PROGRESS NOTES
Chart reviewed by [facility] for discharge planning. Pt remains on Airvo 60L/100% Fi02  Discharge plan is undetermined at this time. Pt is 4 days post partum s/p .  Infant is currently in the [address] [place] [address] Rd  Will continue to follow for discharge planning needs  Please consult  if any new issues arise

## 2021-01-13 NOTE — ROUTINE PROCESS
Respiratory Care Services       Policy Number: [id number]    Title: Patient Care Assessment Protocol    Effective Date: 01/1999    Revised Date: 05/2014, 04/2018, 12/2018, 07/2019    Reviewed Date: 06/2013/ 03/2015, 03/2016, 06/2017, 11/2020        Overview  In an effort to improve quality and reduce costs of respiratory care at [facility], the Respiratory Department has developed a number of Patient Care Protocols. These protocols have been developed according to Ramiro 3 and are utilized for those patients who are ordered respiratory therapy using therapeutic indications and standardized approaches for accomplishing objectives. Patient Care Protocols are intended to improve care by:   Defining the indications and standards of care agreed upon by the Pulmonary Medicine and [address] [place] [address] of [facility].  Training respiratory care practitioners to apply those criteria to individual patients and modify therapy as indicated by the protocols.  Documenting the indication and care plan as part of the initial ordering process.  Tapering or discontinuing treatments once the indication for therapy changes. The Patient Care Protocols shall be universally applied throughout the hospital as determined by   the Pulmonary Medicine and [address] [place] [address].     Rationale for Patient Care Assessment Protocols:   Continuous Quality Improvement   Cost containment   Standardization of care   Enhanced continuity of care   Utilization review   Timely intervention    The following patient care assessment protocols have been developed:   Aerosolized Medication Protocol    Bronchial Hygiene Protocol    Oxygen Protocol   CVRU Fast Track Weaning Protocol    Asthma Treatment Protocol ER   Pediatric Asthma Treatment Protocol ER   Alpha-1 Antitrypsin Deficiency Protocol   Prone Positioning Protocol    COPD Protocol  Home Oxygen Assessment Protocol   Ventilator Weaning Protocol    Lung Volume Expansion Protocol    The Director of [address] [place] [address] oversees the Patient Care Assessment Program. The Respiratory Educator is responsible for protocol development and training. The Supervisor is responsible for implementation and  activities. Each patient with an order for respiratory treatments will receive an evaluation. Respiratory Care Practitioners (RCP's) will perform the evaluations. The same evaluation tool will be utilized for initial and follow-up assessments. If the patient does not meet criteria for ordered therapy, the therapy will be discontinued. If the patient demonstrates an adverse response to initially ordered therapy, the therapy will be discontinued and the physician will be contacted. Specific physician's orders that deviate from protocols and are deemed \"inappropriate\" or \"unsafe\" will be addressed with ordering physician and/or medical director as required. Respiratory Patient Care Assessment Protocols    I. Policy: In an effort to provide quality patient care and effective utilization of services, physicians who order respiratory therapy will have their patients treated via the protocols established (see attached) Respiratory Care Practitioners (RCP's) will complete the initial assessment which will indicate patient needs,  the care plan developed and will performed within 24 hours of admission. Frequency of the therapy will be set according to the results of the respiratory therapy evaluation and frequency guidelines policy. Reassessment will be continued every 48 hours and more frequently as needed for the individual patient. II. Purpose:     A. To provide a process that will allow for ongoing assessment and care plan modification for patients receiving respiratory services based on both objective and or subjective patient responses to interventions. This process of protocol utilization will assist in patient care progression while eliminating the need for the physician to continually update respiratory therapy orders. B. To assure continuity of respiratory care that meets [facility] Clinical Practice Guidelines. III. Initiation:  Implement Respiratory Care Protocols for patients who are ordered by physician          to receive respiratory therapy procedures or for ventilator management. IV. Protocol:  A. Upon receiving an order for therapy the RCP will review the patient's EMR (electronic medical record) for all pertinent information includin. Physician's order for therapy  2. Patient history and physical examination  3. Physician progress notes  4. Diagnostic. X-rays, PFT's, arterial blood gases etc.  B. The RCP will perform a respiratory assessment in the following manner:  1. General observations: color, pattern and effort of breathing, chest expansion, (symmetrical and bilateral), level of consciousness and the ability to ambulate. 2. The RCP will assess patient's cough ability and determine if bronchial hygiene is needed. If patient is unable to produce sputum, at that time, the RCP should question the patient with regard to their sputum: production, color consistency, frequency and amount. 3. Auscultation: Using a stethoscope, the RCP will listen and note quality of breath sounds and presence or absence of adventitious breath sounds in all lung fields, both anteriorly and posteriorly. 4. Upon completing the EMR review and physical assessment, the RCP will document findings in the RT Assessment section of the EMR. The score level will be provided and will be used to determine the frequency of therapy. V. Indications:   A. Bronchial Hygiene Protocol indications:   1. Potential for or presence of atelectasis. 1. Need for hydration and removal of retained secretions. 1. Need for improvement of cough effectiveness.   1. Presence of conditions associated with disorder of pulmonary clearance:  a. Cystic fibrosis  b. Bronchiectasis  c. Neuromuscular disease  d. Obstructive lung diseases  e. Restrictive lung diseases   Aerosolized Medication(s) Protocol indications:Treatment of bronchospasm/wheezing  2. Improvement of mucociliary clearance  3. Treatment of stridor  4. History of Bronchiectasis, Asthma or COPD  C. Oxygen Therapy Protocol indications:   1. Documented hypoxemia  2. Severe trauma  3. Acute myocardial infarction  4. Short-term therapy (e.g. post anesthesia recovery)  D. CVRU Ventilator Weaning Protocol indications:  1. All mechanically ventilated surgical patients unless they have a no wean order. E. Asthma Treatment Protocol ER indications:  1. Patients 15years of age and older that have been triaged or diagnosed with   asthma exacerbation shall be indicated for the ER Asthma Treatment Protocol. A physician order will be required to initiate the protocol. F. Pediatric Asthma protocol in the ER indications:  1. Patients less than 15years old that have been triaged or diagnosed with asthma exacerbation shall be indicated for the Pediatric Asthma protocol. A physician order will be required to initiate the protocol. G. Alpha-1 Antitrypsin Deficiency Testing protocol indications:         1. Patients admitted and diagnosed with COPD. H. Prone Positioning Protocol indications:         1. Acute lung injury         2. Acute respiratory distress syndrome (ARDS)   I. Respiratory Care COPD Protocol indications:         1. History of COPD in patient's records         2. Smoking history   J. Home Oxygen Assessment Protocol indications:         1. Chronic lung disease         2. Cor pulmonale         3. Unable to wean to room air 48 hours prior to anticipated discharge. K.  Ventilator Weaning Protocol indications:         1. Patient's mechanically ventilated          2. Managed by intensivist  L.  Lung Volume Expansion Protocol indications:        1. Any patient at risk for pulmonary complications. VI. Maintenance:    A. Timely patient assessment is an integral part of this protocol therefore the following will be applied:  1. All non- critical care patients will be evaluated upon receiving initial respiratory care orders within 24 hours and re-evaluated within 48 hours (or more as needed). 2. Orders requesting a Respiratory Consult will be responded to in the following manner:  a. In patient emergency situations, the RCP assigned to the floor will respond immediately to the patient, provide an initial respiratory assessment, and contact the patient's physician as necessary for appropriate orders. b. In non-emergent situations, the RCP assigned to the floor will respond to the patient within 90 minutes and provide an initial respiratory assessment and contact patient's physician as necessary for appropriate orders. c. An RCP will provide a comprehensive assessment as soon as possible. 3. Upon completion of an evaluation, the RCP will complete documentation in the patient's EMR in the RT Assessment section. 4. The RCP who completes the assessment will document orders for therapy in the orders section of the patient's EMR selecting new order. Next, per protocol should be selected indicating it is a protocol order and sign orders should be selected to complete the process. The applicable protocol must be added to the progress note per Joint Commission guidelines. 5. The Pharmacy and Therapeutics (P&T) Committee has mandated that the medication Xopenex may be changed to unit dose albuterol without an order, except for those patients receiving Xopenex due to cardiac arrhythmias. 1. The dosage for these patients should be 0.63 mg. and may be changed from 1.25 mg. to 0.63 mg per P & T Committee by the RCP completing the assessment.   6. Patients who are not experiencing cardiac arrhythmias, and are ordered Xopenex and Atrovent may be changed to Duoneb. VII. Safety:        A. The following safety issues shall be monitored:  1. The RCP will perform hand hygiene per hospital policy utilizing Standard Precautions for all patients and following transmission-based isolation as indicated per hospital policy. 2. The RCP must exercise professional judgment in classifying the patient for frequency of therapy. 3. Appropriate classification of the patient will require an evaluation utilizing the Therapy Assessment Protocol Guidelines. 4. The RCP will confer with the physician concerning the care of the patient at any time questions or problems arise. 5. If during therapy, the patient exhibits no improvement, or deterioration in clinical status the RCP will notify the physician and the patient's nurse. VIII. Interventions:   A. The patient's nurse is responsible concerning all items related to his/her care. Ongoing communication with nursing is essential to successful protocol management. B. The RCP recognizes the value of the team approach in meeting the patient's needs. Nursing input regarding the patient's pulmonary condition will be sought as needed. IX. Reportable conditions: The RCP will inform the physician if:  1. There are acute changes in patient's respiratory status. 2. The therapist is unable to determine appropriate care plan upon assessment. 3. The patient fails to reach therapeutic objective. 4. A change or additional medication is needed. X.  Patient Education:    A. Patient will receive instruction on the followin. The treatment modality, including objectives and proper technique of therapy  2. Respiratory medications  B. Documentation shall occur in the patient education section of the patient's EMR. XI. Documentation: Record all findings as described above in the patient's EMR. Related Protocols: A. Aerosolized Medication Protocol  B. Bronchial Hygiene   C. Oxygen Protocol   D.  CVRU Fast Track Weaning Protocol  E. Asthma Treatment protocol ER  F. Alpha-1 antitrypsin Deficiency Protocol  G. Prone Positioning Protocol  H. Respiratory Care COPD Protocol  I. Home Oxygen assessment Protocol  J. Ventilator Weaning Protocols   K. Volume Expansion protocol    Indications      Frequency          Level  A. Aerosol therapy   1.  q4h     Severe SOB, wheezing, unable to sleep 1   2.  qid, q4 wa or q6h   Moderate SOB, wheezing   2   3.  tid      Hx of asthma, or COPD mild wheezing,         or facilitate secretion removal              3   4.  bid      Asthma, or COPD, Intermittent wheezing 4   5. PRN, i.e. tid PRN, qid PRN Asthma, or COPD, occasional wheezing 5       B. Bronchopulmonary Hygiene    1. q4h             Copious secretions, SOB, unable to sleep 1   2. qid & PRN            Moderate amounts of secretions   2   3. tid           Small amounts of secretions and poor cough,               history of secretions    3    4. PRN, i.e. tid PRN, qid PRN     Breathing exercises, encourage cough only 4      C. Oxygen Therapy     Follow hospital approved Oxygen Protocol      Note:  qid treatments are due 0800, 1200, 1600, and 2000. tid treatments are due 0800, 1400, and 2000  Q6h treatments are due 0800, 1400, 2000, 0200  Q4 wa teatments are due 0800, 1200, 1600, and 2000. Q4h treatments are due  0800, 1200, 1600, 2000, 0000, and 0400. The Level 1-5 rating system is only to be used as criteria for determining appropriate frequency of therapy. References:   N   Joint Commission Consolidated Fidencio Standard   L    Respiratory Care Department Policy, Procedure and Protocol Guideline Manual, 1995, ROSA Candelario. L  Therapist Driven Respiratory Care Protocols - A Practitioner's Guide for Criteria-Based Respiratory Care by Hunter Cole M.D., and ROSA Jeffers RRT. L  The rationale for therapist-driven protocols: an update.  Respiratory Care 1998; 49:322-639   L Therapist Driven Respiratory Care Protocols - A Practitioner's Guide for Criteria-Based Respiratory Care by Corinn Nyhan, M.D., and ROSA Thomason, OSCAR. L The rationale for therapist-driven protocols: an update. Respiratory Care 1998; [id number]. N   [facility] Clinical Practice Guidelines. A. The RCP will perform a respiratory assessment in the following manner:  1. Perform hand hygiene per hospital policy utilizing Standard Precautions for all patients and following transmission-based isolation as indicated per policy. 2. Identify patient via ID bracelet verifying patient name and birth date. 3. General observations: color, pattern and effort of breathing, chest expansion, (symmetrical and bilateral), level of consciousness and the ability to ambulate. 4. The RCP will assess patients cough ability and determine if Nasotracheal suctioning is needed. If patient is unable to produce sputum, at that time, the RCP should question the patient with regard to their sputum: production, color consistency, frequency and amount. 5. Auscultation: Using a stethoscope, the RCP will listen and note quality of breath sounds and presence or absence of adventitious breath sounds in all lung fields, both anteriorly and posteriorly. 6. Upon completing the EMR review and physical assessment, the RCP will document findings in the RT Assessment section of the EMR. The score level will be provided and will be used to determine the frequency of therapy. V. Indications:   A. Indications for Bronchial Hygiene Protocol will include:  1. Potential for or presence of atelectasis. 2. Need for hydration and removal of retained secretions. 3. Need for improvement of cough effectiveness. 4. Presence of conditions associated with disorder of pulmonary clearance:  a. Cystic fibrosis  b. Bronchiectasis  B. Indications for Aerosolized Medication(s) Protocol should include:  1. Treatment of bronchospasm/wheezing  2. Improvement of mucociliary clearance  3.  Treatment of stridor  4. History of Asthma or COPD             C.  Indications for Oxygen Therapy Protocol should include:  1. Documented hypoxemia  2. Severe trauma  3. Acute myocardial infarction  4. Short-term therapy (e.g. post anesthesia recovery)    VI. Maintenance:    A. Timely patient assessment is an integral part of this protocol therefore the following will be applied:  1. All non- critical care patients will be evaluated upon receiving initial respiratory care orders within 24 hours and re-evaluated within 48 hours (or more as needed). 2. Orders requesting a Respiratory Consult will be responded to in the following manner:  a. In patient emergency situations, the RCP assigned to the floor will respond immediately to the patient, provide an initial respiratory assessment, and contact the patients physician as necessary for appropriate orders. b. In non-emergent situations, the RCP assigned to the floor will respond to the patient within 90 minutes and provide an initial respiratory assessment and contact patients physician as necessary for appropriate orders. c. An RCP will provide a comprehensive assessment as soon as possible. 3. Upon completion of an evaluation, the RCP will complete documentation in the patients EMR in the RT Assessment section. 4. The RCP who completes the assessment will document orders for therapy in the orders section of the patients EMR selecting new order. Next, per protocol should be selected indicating it is a protocol order and sign orders should be selected to complete the process. 5. The Pharmacy and Therapeutics (P&T) Committee has mandated that the medication Xopenex may be changed to unit dose albuterol without an order, except for those patients receiving Xopenex due to cardiac arrhythmias. The dosage for these patients should be 0.63 mg. and may be changed from 1.25 mg. to 0.63 mg per P & T Committee by the RCP completing the assessment.   6. Patients who are not experiencing cardiac arrhythmias, and are ordered Xopenex and Atrovent may be changed to Duoneb. VII. Safety:        A. The following safety issues shall be monitored:  1. The RCP will perform hand hygiene per hospital policy utilizing Standard Precautions for all patients and following transmission-based isolation as indicated per hospital policy. 2. The RCP must exercise professional judgment in classifying the patient for frequency of therapy. 3. Appropriate classification of the patient will require an evaluation utilizing the Therapy Assessment Protocol Guidelines. 4. The RCP will confer with the physician concerning the care of the patient at any time questions or problems arise. 5. If during therapy, the patient exhibits no improvement or deterioration in clinical status the RCP will notify the physician and the patients nurse. VIII. Interventions:   A. The patients nurse is responsible concerning all items related to his/her care. Ongoing communication with nursing is essential to successful protocol management. B. The RCP recognizes the value of the team approach in meeting the patients needs. Nursing input regarding the patients pulmonary condition will be sought as needed. IX. Reportable conditions: The RCP will inform the physician if:  1. There are acute changes in patients respiratory status. 2. The therapist is unable to determine appropriate care plan upon assessment. 3. The patient fails to reach therapeutic objective. 4. A change or additional medication is needed. X.  Patient Education:    A. Patient will receive instruction on the followin. The treatment modality, including objectives and proper technique of therapy  2. Respiratory medications  B. Documentation shall occur in the patient education section of the patients EMR. XI. Documentation: Record all findings as described above in the patients EMR. Related Protocols: A.  Aerosolized Medication Protocol  B. Bronchial Hygiene  C. Oxygen Protocol   D. Volume Expansion/Secretion Clearance  E. Ventilator Weaning Protocols    References:  N   Joint [facility] Consolidated Fidencio Standard   L    Respiratory Care Department Policy, Procedure and Protocol Guideline Manual, 1995, ROSA SCHAEFFER  Therapist Driven Respiratory Care Protocols - A Practitioners Guide for Criteria-Based Respiratory Care by Yudelka Cam M.D., and BALBINA Thurman  The rationale for therapist-driven protocols: an update. Respiratory Care 1998; 1150 [facility] Guidelines. Respiratory Care Services Policy Number: [id number]    Title: Aerosolized Medication Protocol    Effective Date: 10/1998    Revised Date: 06/13, 03/16, 11/17, 07/19     Reviewed Date: 05/14/ 03/15 , 06/17, 5/18, 11/2020   I. Policy: The Aerosolized Medication Protocol shall by implemented by Respiratory Care Practitioners (RCP) for patients with orders to receive aerosol therapy with medication. II. Purpose: To open and maintain obstructed airways, the RCP, will utilize the following   protocol to select the indicated aerosolized medication(s) and determine the most effective method of delivery to the patient. III. Patient Type: All patients who are determined to meet aerosolized medication criteria as          outlined in this protocol. IV. Responsibility: Director, [address], registered Respiratory Care Practitioners (RCP's) with documented competency in the performance of respiratory therapeutic techniques. V. Equipment needed:  C. Stethoscope  D. Pulse oximeter  E. AeroEclipse nebulizer  F. Dry Powder Inhaler (DPI)     VI. Protocol:   C. The following conditions are accepted indications for aerosolized medication therapy. 5. Bronchospasm/wheezing  6. Impaired mucociliary clearance  7. Tracheobronchial mucosal congestion/and laryngeal stridor  8.  Diseases which commonly require aerosolized medication therapy include, but are not limited to:  f. Asthma/reactive airway disease  g. Bronchitis/emphysema (COPD)  h. Cystic fibrosis  i. Severe laryngitis/tracheitis  j. Bronchiectasis  k. Smoke inhalation or chemical trauma to the lung or upper airway  l. Physical trauma to the upper airway  m. Laryngotracheobronchitis  n. Bronchiolitis  o. Non-specific wheezing              B. Indications for bronchodilator medications will include:  d. Bronchospasm/ wheezing  e. Asthma/reactive airway disease  f. Chronic obstructive pulmonary disease  g. Obstructive defect on pulmonary function testing  C. Administration of medications  5. If a bronchodilator or any other type of respiratory medication is needed, a physician order must be indicated in the medication section in the patient's EMR. 6. When the physician specifies the medication and dosage at the time of request, the ordered medication will be used as part of the care plan. D. The following guidelines will be utilized in the evaluation and selection of the appropriate delivery device for indicated medication(s):  1. Unassisted aerosol (UA) is the preferred method of aerosol delivery and indicated if  c. Ventilation is inadequate  d. Patient demonstrates wheezing   e. Routine treatments shall be given via the AeroEclipse nebulizer. f. The Aerogen nebulizer shall be used in the following circumstances:  i. ER patients and they will continue with this nebulizer if admitted to 8th floor or ICU.  ii. Patients in ICU   iii. Patients on 8th floor with severe wheezing (at the RCP's discretion)  2. Dry PowderInhaler (DPI)   d. Patient should be alert/cooperative  e. Able to perform 3 second breath hold. f. Patient has used DPI therapy previously, either at home or in the hospital.  g. Note: The only approved inhaler on formulary is Spiriva. VII. Guidelines:   Monitor patient's vital signs and evaluate patient's clinical status.  The need to change medication and/or modality may be indicated by:  5. A pulse greater than 120 bpm, or if a pulse increase of 20 bpm occurs with bronchodilator medications. 6. Significant worsening of dyspnea or wheezing occurring during or within 30 minutes of discontinuing therapy. 7. Worsening of patient's sensorium (e.g. patient becomes confused or obtunded, and unable to follow directions). 8. Worsening of patient's chest x-ray. 9. Change in sputum (e.g. increased pulmonary infiltrate, which might indicate need for volume expansion therapy). 10. Patient has difficulty coughing up secretions, which might indicate need for acetylcysteine and/or bronchial hygiene therapy. 11. Call physician immediately if dyspnea worsens and is not responsive to modifications allowed by protocol. VIII. Clinical Responsibility:  2. The therapy assessment guidelines will be used to evaluate all patients receiving aerosolized medications with the exception of critical care areas. 5. RCP's will perform changes in therapy per protocol. 6. It will be the responsibility of RCP to provide instruction regarding respiratory medications, possible side effects, aerosol therapy and proper DPI technique, as well as, spacer usage to patients ordered DPI therapy. 7. Current therapy that is part of a patient's home regimen will not be discontinued. a. Provide spacer and educate patient on proper inhaler technique if needed. IX. Documentation  A. Document assessment findings in the respiratory assessment section of the patient's EMR. B. Document changes in therapy per protocol in the respiratory orders section and in the care plan section of the patient's EMR. C. Document patient education in the patient education section of the patient's EMR. X. Outcome Criteria:  B. Relief of wheezes and obstruction  C. Improved cough and sputum color and consistency  D. Improved chest x-ray  E. Improved arterial oxygen tension and or SaO2  F.  Improved Peak Flow on asthmatic patients        XI. Related Protocols:  B. Respiratory Patient Care Protocols  C. Bronchial Hygiene Therapy  D. Oxygen Protocol    Reference:  L - Respiratory Care Department Policy, Procedure and Protocol Guideline Manual, 1995, ROSA Candelario. L -  Therapist Driven Respiratory Care Protocols - A Practitioner's Guide for Criteria-Based Respiratory Care by Usha Cope M.D., and ROSA Yao, OSCAR. L - The rationale for therapist-driven protocols: an update. Respiratory Care 1998; [id number]. N -[facility] Clinical Practice Guidelines.

## 2021-01-13 NOTE — PROGRESS NOTES
Progress Note    Patient: Shirley Garcia MRN: [id number]  SSN: [id number]    YOB: 1985  Age: 31 y.o. Sex: female      Admit Date: 1/10/2021    LOS: 3 days     Subjective:     Patient with past medical history of    COVID infection since 2021  Pre-eclampsia     Admitted to [facility] s/p  section on 2021     On room air until 1-. CXR shows bilateral infiltrates. Now on oxygen 60 LPM. Pulmonary is consulted. Objective:     Vitals:    21 0411 21 0727 21 0818 21 1135   BP: (!) 130/90 (!) 131/93  123/83   Pulse: 100 (!) 109  (!) 117   Resp:   20   Temp: 97.5 °F (36.4 °C) 98 °F (36.7 °C)  98.2 °F (36.8 °C)   SpO2: 98% 94% 97% 100%        Intake and Output:  Current Shift: 701 - 1900  In: 240 [P.O.:240]  Out: -   Last three shifts: 1901 -  0700  In: 833 [P.O.:450; I.V.:383]  Out: 3800 [Urine:3800]    Physical Exam:     General:                    The patient is a pleasant female in acute respiratory distress. On high flow oxygen   Head:                                   Normocephalic/atraumatic. Eyes:                                   No palpebral pallor or scleral icterus. ENT:                                    External auricular and nasal exam within normal limits. Mucous membranes are moist.  Neck:                                   Supple, non-tender, no JVD. Lungs:                       diminished to auscultation bilaterally without wheezes or crackles. No respiratory distress or accessory muscle use. Heart:                                  Regular rate and rhythm, without murmurs, rubs, or gallops. Abdomen:                  Soft, non-tender, non-distended with normoactive bowel sounds. Genitourinary:           No tenderness over the bladder or bilateral CVAs.   Extremities:               Without clubbing, cyanosis, or edema.  Skin:                                    Normal color, texture, and turgor. No rashes, lesions, or jaundice. Pulses:                      Radial and dorsalis pedis pulses present 2+ bilaterally. Capillary refill <2s. Neurologic:                CN II-XII grossly intact and symmetrical.                                               Moving all four extremities well with no focal deficits. Psychiatric:                Pleasant demeanor, appropriate affect.  Alert and oriented x 3      Lab/Data Review:    Recent Results (from the past 24 hour(s))   GLUCOSE, POC    Collection Time: 01/12/21  2:58 PM   Result Value Ref Range    Glucose (POC) 212 (H) 65 - 100 mg/dL   D DIMER    Collection Time: 01/12/21  3:36 PM   Result Value Ref Range    D DIMER 4.77 (H) <0.56 ug/ml(FEU)   C REACTIVE PROTEIN, QT    Collection Time: 01/12/21  3:36 PM   Result Value Ref Range    C-Reactive protein 15.9 (H) 0.0 - 0.9 mg/dL   PROCALCITONIN    Collection Time: 01/12/21  3:36 PM   Result Value Ref Range    Procalcitonin 1.37 ng/mL   NT-PRO BNP    Collection Time: 01/12/21  3:36 PM   Result Value Ref Range    NT pro- 5 - 125 PG/ML   GLUCOSE, POC    Collection Time: 01/12/21  8:27 PM   Result Value Ref Range    Glucose (POC) 209 (H) 65 - 100 mg/dL   D DIMER    Collection Time: 01/13/21  5:34 AM   Result Value Ref Range    D DIMER 5.34 (H) <0.56 ug/ml(FEU)   CBC WITH AUTOMATED DIFF    Collection Time: 01/13/21  5:34 AM   Result Value Ref Range    WBC 14.2 (H) 4.3 - 11.1 K/uL    RBC 4.44 4.05 - 5.2 M/uL    HGB 11.1 (L) 11.7 - 15.4 g/dL    HCT 34.6 (L) 35.8 - 46.3 %    MCV 77.9 (L) 79.6 - 97.8 FL    MCH 25.0 (L) 26.1 - 32.9 PG    MCHC 32.1 31.4 - 35.0 g/dL    RDW 20.1 (H) 11.9 - 14.6 %    PLATELET 596 012 - 003 K/uL    MPV 10.0 9.4 - 12.3 FL    ABSOLUTE NRBC 0.07 0.0 - 0.2 K/uL    DF AUTOMATED      NEUTROPHILS 77 43 - 78 %    LYMPHOCYTES 16 13 - 44 %    MONOCYTES 3 (L) 4.0 - 12.0 % EOSINOPHILS 0 (L) 0.5 - 7.8 %    BASOPHILS 0 0.0 - 2.0 %    IMMATURE GRANULOCYTES 3 0.0 - 5.0 %    ABS. NEUTROPHILS 10.9 (H) 1.7 - 8.2 K/UL    ABS. LYMPHOCYTES 2.3 0.5 - 4.6 K/UL    ABS. MONOCYTES 0.4 0.1 - 1.3 K/UL    ABS. EOSINOPHILS 0.0 0.0 - 0.8 K/UL    ABS. BASOPHILS 0.0 0.0 - 0.2 K/UL    ABS. IMM. GRANS. 0.4 0.0 - 0.5 K/UL   METABOLIC PANEL, COMPREHENSIVE    Collection Time: 01/13/21  5:34 AM   Result Value Ref Range    Sodium 141 136 - 145 mmol/L    Potassium 3.6 3.5 - 5.1 mmol/L    Chloride 107 98 - 107 mmol/L    CO2 25 21 - 32 mmol/L    Anion gap 9 7 - 16 mmol/L    Glucose 165 (H) 65 - 100 mg/dL    BUN 19 6 - 23 MG/DL    Creatinine 0.59 (L) 0.6 - 1.0 MG/DL    GFR est AA >60 >60 ml/min/1.73m2    GFR est non-AA >60 >60 ml/min/1.73m2    Calcium 9.2 8.3 - 10.4 MG/DL    Bilirubin, total 0.4 0.2 - 1.1 MG/DL    ALT (SGPT) 25 12 - 65 U/L    AST (SGOT) 54 (H) 15 - 37 U/L    Alk. phosphatase 121 50 - 136 U/L    Protein, total 6.9 6.3 - 8.2 g/dL    Albumin 2.1 (L) 3.5 - 5.0 g/dL    Globulin 4.8 (H) 2.3 - 3.5 g/dL    A-G Ratio 0.4 (L) 1.2 - 3.5     GLUCOSE, POC    Collection Time: 01/13/21  7:30 AM   Result Value Ref Range    Glucose (POC) 148 (H) 65 - 100 mg/dL     Results     Procedure Component Value Units Date/Time    CULTURE, BLOOD [[id number]] Collected: 01/09/21 0918    Order Status: Completed Specimen: Blood Updated: 01/13/21 0714     Special Requests: --        LEFT  HAND       Culture result: NO GROWTH 4 DAYS       CULTURE, BLOOD [[id number]] Collected: 01/09/21 0940    Order Status: Completed Specimen: Blood Updated: 01/13/21 0740     Special Requests: --        LEFT  ARM       Culture result: NO GROWTH 4 DAYS       INFLUENZA A & B AG (RAPID TEST) [[id number]] Collected: 01/07/21 0512    Order Status: Completed Specimen: Nasopharyngeal from Nasal washing Updated: 01/07/21 0530     Influenza A Ag Negative        Comment: NEGATIVE FOR THE PRESENCE OF INFLUENZA A ANTIGEN  INFECTION DUE TO INFLUENZA A CANNOT BE  RULED OUT. BECAUSE THE ANTIGEN PRESENT IN THE SAMPLE MAY BE BELOW  THE DETECTION LIMIT OF THE TEST. A NEGATIVE TEST IS PRESUMPTIVE AND IT IS RECOMMENDED THAT THESE RESULTS BE CONFIRMED BY VIRAL CULTURE OR AN FDA-CLEARED INFLUENZA A AND B MOLECULAR ASSAY. Influenza B Ag Negative        Comment: NEGATIVE FOR THE PRESENCE OF INFLUENZA B ANTIGEN  INFECTION DUE TO INFLUENZA B CANNOT BE RULED OUT. BECAUSE THE ANTIGEN PRESENT IN THE SAMPLE MAY BE BELOW  THE DETECTION LIMIT OF THE TEST. A NEGATIVE TEST IS PRESUMPTIVE AND IT IS RECOMMENDED THAT THESE RESULTS BE CONFIRMED BY VIRAL CULTURE OR AN FDA-CLEARED INFLUENZA A AND B MOLECULAR ASSAY. Source Nasopharyngeal       CULTURE, URINE [[id number]] Collected: 01/07/21 0512    Order Status: Completed Specimen: Cath Urine Updated: 01/09/21 0724     Special Requests: NO SPECIAL REQUESTS        Culture result: NO GROWTH 2 DAYS       CULTURE, GENITAL GROUP B STREP [[id number]]  (Abnormal) Collected: 01/07/21 0447    Order Status: Completed Specimen: VAGINAL/RECTAL Updated: 01/11/21 0952     Special Requests: NO SPECIAL REQUESTS        Culture result:       STREPTOCOCCI, BETA HEMOLYTIC GROUP B                  GROUP B STREPTOCOCCI REMAIN UNIVERSALLY SUSCEPTIBLE TO PENICILLIN, AMPICILLIN, AND CEFAZOLIN. RESISTANCE TO CLINDAMYCIN AND ERYTHROMYCIN CAN OCCUR. PLEASE CONTACT LABORATORY WITHIN 48 HOURS IF ERYTHROMYCIN AND CLINDAMYCIN SUSCEPTIBILITY TESTING IS NEEDED. GRP B STREP SCRN BY PCR [[id number]]  (Abnormal) Collected: 01/07/21 0447    Order Status: Completed Specimen: VAGINAL/RECTAL Updated: 01/07/21 0644     Special Requests: NO SPECIAL REQUESTS        Culture result:       POSITIVE: GBS target nucleic acid is detected. Presumptive for GBS colonization.                   RESULTS VERIFIED, PHONED TO AND READ BACK BY  WES AMADO 4OB AT 0532 ON 1/7/21 HA      CULTURE, URINE [[id number]] Collected: 01/07/21 0344    Order Status: Canceled Specimen: Urine from Clean catch         CT chest   1-9-2021  Impression:   1. No evidence of PE.  2. Bilateral infiltrates in keeping with Covid-19, similar to recent  radiography. 3. Trace pericardial effusion and trace left pleural effusion. XR chest  1-  Impression:       1. Slightly improving aeration with decreasing consolidative changes along the  peripheral margin of left lung.     TTE   1-    SUMMARY:     -  Left ventricle: Systolic function was normal. Ejection fraction was  estimated in the range of 60 % to 65 %.  There were no regional wall motion  abnormalities.         Current Facility-Administered Medications:     albuterol (PROVENTIL HFA, VENTOLIN HFA, PROAIR HFA) inhaler 4 Puff, 4 Puff, Inhalation, Q6H PRN, González Cleary MD    albuterol (PROVENTIL HFA, VENTOLIN HFA, PROAIR HFA) inhaler 2 Puff, 2 Puff, Inhalation, TID RT, González Cleary MD    doxycycline (VIBRAMYCIN) 100 mg in 0.9% sodium chloride (MBP/ADV) 100 mL MBP, 100 mg, IntraVENous, Q12H, Judson Ng MD, Last Rate: 100 mL/hr at 01/13/21 0041, 100 mg at 01/13/21 0041    dexamethasone (DECADRON) 10 mg/mL injection 6 mg, 6 mg, IntraVENous, Q8H, Judy Jose, DO, 6 mg at 01/13/21 0531    insulin lispro (HUMALOG) injection 0-10 Units, 0-10 Units, SubCUTAneous, AC&HS, Damon Laura Maker, DO, 2 Units at 01/13/21 1157    [COMPLETED] remdesivir 200 mg in 0.9% sodium chloride 250 mL IVPB, 200 mg, IntraVENous, ONCE, 200 mg at 01/12/21 1132 **FOLLOWED BY** remdesivir 100 mg in 0.9% sodium chloride 250 mL IVPB, 100 mg, IntraVENous, Q24H, Elie Joseison ISAURO, DO, 100 mg at 01/13/21 1158    enoxaparin (LOVENOX) injection 30 mg, 30 mg, SubCUTAneous, Q12H, Judy Jose, DO, 30 mg at 01/13/21 0903    0.9% sodium chloride infusion 50 mL remdesivir flush, 50 mL, IntraVENous, Q24H, Judy Jose, , 50 mL at 01/12/21 1200    dextrose 40% (GLUTOSE) oral gel 1 Tube, 15 g, Oral, PRN, Judy Jose,     glucagon (GLUCAGEN) injection 1 mg, 1 mg, IntraMUSCular, PRN, Judy Jose,     dextrose (D50W) injection syrg 12.5-25 g, 25-50 mL, IntraVENous, PRN, Judy Jose,     furosemide (LASIX) injection 20 mg, 20 mg, IntraVENous, Q12H, SineGretel MD, 20 mg at 21 0904    diphenhydrAMINE (BENADRYL) injection 12.5 mg, 12.5 mg, IntraVENous, Q6H PRN, Josee Mckee MD    promethazine (PHENERGAN) with saline injection 6.25 mg, 6.25 mg, IntraVENous, Q6H PRN, Josee Mckee MD    simethicone (MYLICON) tablet 80 mg, 80 mg, Oral, AC&HS, Stacy Alvarez MD, 80 mg at 21 1142    0.9% sodium chloride infusion 250 mL, 250 mL, IntraVENous, PRN, Josee Mckee MD    ascorbic acid (vitamin C) (VITAMIN C) tablet 1,000 mg, 1,000 mg, Oral, TID, Brooke Mckee MD, 1,000 mg at 21 0903    cefTRIAXone (ROCEPHIN) 2 g in 0.9% sodium chloride (MBP/ADV) 50 mL MBP, 2 g, IntraVENous, Q24H, Brooke Mckee MD, Last Rate: 100 mL/hr at 21 1235, 2 g at 21 1235    docusate sodium (COLACE) capsule 100 mg, 100 mg, Oral, BID, Josee Mckee MD, 100 mg at 21 0834    ibuprofen (MOTRIN) tablet 800 mg, 800 mg, Oral, Q6H PRN, Brooke Mckee MD, 800 mg at 21 0508    NIFEdipine (PROCARDIA) capsule 20 mg, 20 mg, Oral, Q6H, Josee Mckee MD, 20 mg at 21 0903    polyethylene glycol (MIRALAX) packet 17 g, 17 g, Oral, DAILY, Brooke Mckee MD, 17 g at 21 0834    zinc sulfate tablet 220 mg, 220 mg, Oral, DAILY, Aditya-Brooke Donovan MD, 220 mg at 21 0904    metoprolol (LOPRESSOR) injection 5 mg, 5 mg, IntraVENous, Q6H PRN, Jose Edwards MD, 5 mg at 21    Assessment:     Principal Problem:    COVID-19 affecting pregnancy in third trimester (2021)      Overview: Sx 21      Positive 21      Precautions through 21    Active Problems:    Tachycardia (2021)      H/O  section (2021) SOB (shortness of breath) (2021)      Pneumonia due to COVID-19 virus (2021)        Plan:     Acute respiratory failure with hypoxia   COVID pneumonia   S/P convalescent plasma, Decadron  Continue oxygen supplementation and wean as tolerated. Empiric IV antibiotics. Patient declines Remdesivir. Pre-eclampsia   Continue Procardia. Monitor BP     Post partum   S/P  section     I have discussed the plan of care with patient.       DVT prophylaxis : SCD     Signed By: Kristina Gale MD     2021

## 2021-01-13 NOTE — CONSULTS
Count includes the [facility] Critical Care COVID-19 Note:    Critical Care Note: 1/13/2021    Lino Flores  Admission Date: 1/10/2021     Length of Stay: 3 days    Background: 31 y.o. y/o female with acute hypoxemic respiratory failure secondary to COVID-19. Date of COVID-19 symptom onset: 1/7/21    Notable PMH: CSection on 1-9-21 with delivery of female baby, Pre-E  Drug Allergies: No Known Allergies    HPI:  Ms. Troy Garnica is a 36 yo female with PMH of [place] since 1-7-21 admitted to OB s/p CSection on 1-9-21 with delivery of female baby. She has been on room air until 1-10-21. CXR shows bilateral infiltrates. CTA chest no PE though bilateral infiltrates.      Transferred to [facility] from [facility] d/t covid status     1/11 - reports depression, lack of appetite. Remains on high flow oxygen 10-12 L HF  1/12 - increased to AirVo with NRB. Given lasix x 1 this morning, started on Lovenox. She states she feels better and wants to walk around though is on 100% AirVo with NRB and sat 94%. She states other than the low oxygen she thinks she feels better than this morning or yesterday. She denies pain. She hasn't noted any chest congestion or cough. 1/13 - feeling better today. Has taken NRB mask off, remains with Airvo, O2 sat remains 100%, so weaned airvo to 60L. 90% and O2 sat remains %. REVIEW OF SYSTEMS:  Constitutional:  no fever, No chills, night sweats, weight loss, weight gain  Eyes:  Denies problems with eye pain, erythema, blurred vision, or visual field loss. ENTM:  Denies sore throat, no loss of taste or smell  Lymph:  Denies swollen glands. Cardiac:  No chest pain, pressure, discomfort, palpitations, orthopnea, murmurs, or edema. GI:  No dysphagia, heartburn reflux, nausea/vomiting, diarrhea, abdominal pain, or bleeding. :Denies history of dysuria, hematuria, polyuria, or decreased urine output. MS:  No history of myalgias, arthralgias, bone pain, or muscle cramps.   Skin:  No history of rashes, jaundice, cyanosis, nodules, or ulcers. Endo:  Negative for heat or cold intolerance. No polyuria/polydipsia  Psych:  +anxiety, No depression, insomnia, hallucinations. Neuro: There is no history of AMS, persistent headache, decreased level of consciousness, seizures, or motor or sensory deficits. Visit Vitals  BP (!) 131/93 (BP 1 Location: Right arm, BP Patient Position: At rest)   Pulse (!) 109   Temp 98 °F (36.7 °C)   Resp 20   SpO2 97%     I/O:    Intake/Output Summary (Last 24 hours) at 1/13/2021 0957  Last data filed at 1/12/2021 2304  Gross per 24 hour   Intake 240 ml   Output 2700 ml   Net -2460 ml      Pertinent Exam:            Constitutional: awake, alert, appears comfortable  EENMT:  Sclera clear, pupils equal, oral mucosa moist  Respiratory: faint crackles bilaterally  Cardiovascular:  RRR with no M,G,R;  Gastrointestinal:  soft with no tenderness; positive bowel sounds present  Musculoskeletal:  warm with no cyanosis, no lower extremity edema  Skin:  no jaundice or ecchymosis  Neurologic: no gross neuro deficits     Psychiatric: calm, anxious to see child. CXR:   1/13/21  none    1/12/21  bilateral infiltrates, somewhat better perhaps than prior      Lines (insertion date):    PIV  External catheter    Drips: current dose (range)  None    COVID-19 Meds:  Dexamethasone 6mg daily (1/11)  Remdesivir (1/12; 5-10 days)  Convalescent plasma transfused 1/11    SARS-CoV-2 LAB Results    Microbiology Results  Date  Source Culture Result   1/7/21 urine NGTD   1/9/21 Blood x 2 NGTD     Anti-infectives (start date):   Rocephin - 1/11 -   Doxy - 1/11 -    Pertinent Labs:   Recent Labs     01/13/21  0534 01/12/21  0707 01/12/21  0105 01/11/21  0400   WBC 14.2* 14.0* 13.6* 12.2*   HGB 11.1* 11.2* 11.1* 9.7*   HCT 34.6* 35.5* 35.1* 30.4*    315 280 207     Recent Labs     01/13/21  0534 01/12/21  0707 01/12/21  0105    138 140   K 3.6 3.7 3.5    106 108*   CO2 25 26 26   * 102* 134*   BUN 19 8 7   CREA 0.59* 0.50* 0.46*   MG  --   --  1.5*   CA 9.2 8.8 8.4   PHOS  --   --  3.7   ALB 2.1* 2.1* 2.0*   TBILI 0.4 0.3 0.2   ALT 25 26 25     Recent Labs     21  1458 21  1051   GLUCPOC 209* 212* 173*       Oxygen Requirements:  Date O2 support mode FiO2 SaO2   21 Airvo and NRB 60L/100% 97%     IMPRESSION:  Active Hospital Problems    Diagnosis Date Noted    Tachycardia 2021    H/O  section 2021    SOB (shortness of breath) 2021    Pneumonia due to COVID-19 virus 2021    COVID-19 affecting pregnancy in third trimester 2021     Sx 21  Positive 21  Precautions through 21         31 y.o. y/o female with acute hypoxemic respiratory failure secondary to COVID-19. PLAN:  1. Acute respiratory failure with hypoxia: supportive measures with noninvasive oxygen strategy unless tiring or desaturation. Wean as tolerated. Weaned from 60L/100% to 60L/90% and tolerated. NRB mask is not on, but within reach if needed. Responding to lasix so plan to continue for now. With recent Pre-E some of her infiltrates could be pulmonary edema. >>CRP 15.9 and pro- ()>>likley related to inflammatory process, cont  steroids   >> Dimer was 5.34 () and PCT was 1.37 () so agree with steroids and  anticoagulation doses. >> She is on continuous pulse oximetry and given alertness and apparent stability I think  this is reasonable for now. Obviously, in ideal times I would move her to the ICU, but with  surge COVID numbers there is no availability at this point. 2. COVID-19 pneumonia:  Decadron, Convalescent plasma, remdesivir as outlined above. 3. Nutrition: eat as tolerated  4. PPx:  DVT ppx ongoing. H2 blocker with decadron.      Full Code    The patient is critically ill with respiratory failure and requires high complexity decision making for assessment and support including frequent evaluation and titration of therapies, application of advanced monitoring technologies & timing & modality of advanced respiratory support. Cumulative time devoted to patient care services by me for day of service -39min     Natasha Mean, NP  Lungs:  Some minimal crackles in lung bases  Heart:  RRR with no Murmur/Rubs/Gallops    Additional Comments: On 60L, 90% airvo- sats are 99-can wean further    I have spoken with and examined the patient. I agree with the above assessment and plan as documented.     Tami Vital MD

## 2021-01-13 NOTE — PROGRESS NOTES
Disaster charting initiated    Pt resting and stable    Current O2 needs: 60L, 100% and on a non breather

## 2021-01-13 NOTE — PROGRESS NOTES
As requested by staff I assisted Ms. Reese with viewing her baby via [address] and also speaking with [facility] staff. I remained outside the room due to precautions and nurse assisted with call at bedside.      Merlin Walters 70  Board Certified

## 2021-01-13 NOTE — PROGRESS NOTES
Communicated with nurse regarding a 1 pm meeting scheduled for Ms. Leon Qian to view her baby via Quu.      Merlin Gutiérrez 70  Board Certified

## 2021-01-13 NOTE — PROGRESS NOTES
Hospitalist Progress Note    2021  Admit Date: 1/10/2021  3:48 PM   NAME: Shirley Garcia   :  1985   MRN:  [id number]   Attending: Lila Diaz DO  PCP:  Ole, MD Shekhar    SUBJECTIVE:    Ms. Jorge Delarosa is a 36 yo female with [place] [address] since 21 admitted to [facility] s/p CSection on 21 with delivery of female baby. Hx of pre-eclampsia. She has been on room air until 1-10-21. CXR shows bilateral infiltrates. CTA chest no PE though bilateral infiltrates.      Transferred to  from [facility] d/t covid status  Increasing o2 requirements.       - reports depression, lack of appetite. Remains on high flow oxygen 10-12 L HF     - went from 12L HF yesterday to max setting airvo/ 15L NRB today. Has mild peripheral edema. Giving lasix. Consulted pulm. Pt denies chest pain and says she feels pretty good today apart from dyspnea.        Review of Systems negative with exception of pertinent positives noted above  PHYSICAL EXAM     Visit Vitals  /64 (BP 1 Location: Right arm, BP Patient Position: At rest)   Pulse (!) 117   Temp 98.1 °F (36.7 °C)   Resp 28   LMP 2020   SpO2 98%      Temp (24hrs), Av.6 °F (37 °C), Min:98.1 °F (36.7 °C), Max:99.4 °F (37.4 °C)    Oxygen Therapy  O2 Sat (%): 98 % (21 180)  Pulse via Oximetry: 123 beats per minute (21)  O2 Device: Heated; Hi flow nasal cannula; Non-rebreather mask (21)  O2 Flow Rate (L/min): 60 l/min (21)  O2 Temperature: 87.8 °F (31 °C) (21)  FIO2 (%): 100 % (21)    Intake/Output Summary (Last 24 hours) at 2021 1941  Last data filed at 2021 1846  Gross per 24 hour   Intake 833 ml   Output 3400 ml   Net -2567 ml      General: No acute distress    Lungs:  CTA Bilaterally.    Heart:  Regular rate and rhythm,  No murmur, rub, or gallop  Abdomen: Soft, Non distended, Non tender, Positive bowel sounds  Extremities: No cyanosis, clubbing or edema  Neurologic:  No focal deficits    ASSESSMENT      Active Hospital Problems    Diagnosis Date Noted    Tachycardia 2021    H/O  section 2021    SOB (shortness of breath) 2021    Pneumonia due to COVID-19 virus 2021    COVID-19 affecting pregnancy in third trimester 2021     Sx 21  Positive 21  Precautions through 21       A/P  # Covid Pneumonia  - continue empiric CAP coverage  - cont Decadron EOT  (increased dose today  tid)  - CT neg for PE  - conv plasma   - declines remdesivir  - daily lasix    # Preeclampisa  - continue procardia - holding for SBP <120     # sinus tachycardia  - Echo  nml EF  - cardiology following    # post-partum - s/p cscetion'  - px well controlled  - advised to continue pumping as able    DVT Prophylaxis: scds    Signed By: Wilfred Ybarra DO     2021

## 2021-01-14 LAB
ALBUMIN SERPL-MCNC: 2.3 G/DL (ref 3.5–5)
ALBUMIN/GLOB SERPL: 0.5 {RATIO} (ref 1.2–3.5)
ALP SERPL-CCNC: 124 U/L (ref 50–136)
ALT SERPL-CCNC: 21 U/L (ref 12–65)
ANION GAP SERPL CALC-SCNC: 8 MMOL/L (ref 7–16)
AST SERPL-CCNC: 47 U/L (ref 15–37)
BACTERIA SPEC CULT: NORMAL
BACTERIA SPEC CULT: NORMAL
BASOPHILS # BLD: 0 K/UL (ref 0–0.2)
BASOPHILS NFR BLD: 0 % (ref 0–2)
BILIRUB SERPL-MCNC: 0.3 MG/DL (ref 0.2–1.1)
BUN SERPL-MCNC: 28 MG/DL (ref 6–23)
CALCIUM SERPL-MCNC: 9 MG/DL (ref 8.3–10.4)
CHLORIDE SERPL-SCNC: 108 MMOL/L (ref 98–107)
CO2 SERPL-SCNC: 25 MMOL/L (ref 21–32)
CREAT SERPL-MCNC: 0.65 MG/DL (ref 0.6–1)
D DIMER PPP FEU-MCNC: 2.68 UG/ML(FEU)
DIFFERENTIAL METHOD BLD: ABNORMAL
EOSINOPHIL # BLD: 0 K/UL (ref 0–0.8)
EOSINOPHIL NFR BLD: 0 % (ref 0.5–7.8)
ERYTHROCYTE [DISTWIDTH] IN BLOOD BY AUTOMATED COUNT: 20.4 % (ref 11.9–14.6)
GLOBULIN SER CALC-MCNC: 4.7 G/DL (ref 2.3–3.5)
GLUCOSE BLD STRIP.AUTO-MCNC: 107 MG/DL (ref 65–100)
GLUCOSE BLD STRIP.AUTO-MCNC: 138 MG/DL (ref 65–100)
GLUCOSE BLD STRIP.AUTO-MCNC: 166 MG/DL (ref 65–100)
GLUCOSE BLD STRIP.AUTO-MCNC: 263 MG/DL (ref 65–100)
GLUCOSE SERPL-MCNC: 152 MG/DL (ref 65–100)
HCT VFR BLD AUTO: 36.3 % (ref 35.8–46.3)
HGB BLD-MCNC: 11.4 G/DL (ref 11.7–15.4)
IMM GRANULOCYTES # BLD AUTO: 0.5 K/UL (ref 0–0.5)
IMM GRANULOCYTES NFR BLD AUTO: 3 % (ref 0–5)
LYMPHOCYTES # BLD: 2.9 K/UL (ref 0.5–4.6)
LYMPHOCYTES NFR BLD: 18 % (ref 13–44)
MCH RBC QN AUTO: 24.6 PG (ref 26.1–32.9)
MCHC RBC AUTO-ENTMCNC: 31.4 G/DL (ref 31.4–35)
MCV RBC AUTO: 78.2 FL (ref 79.6–97.8)
MONOCYTES # BLD: 0.8 K/UL (ref 0.1–1.3)
MONOCYTES NFR BLD: 5 % (ref 4–12)
NEUTS SEG # BLD: 12.3 K/UL (ref 1.7–8.2)
NEUTS SEG NFR BLD: 74 % (ref 43–78)
NRBC # BLD: 0.04 K/UL (ref 0–0.2)
PLATELET # BLD AUTO: 461 K/UL (ref 150–450)
PMV BLD AUTO: 10 FL (ref 9.4–12.3)
POTASSIUM SERPL-SCNC: 3.5 MMOL/L (ref 3.5–5.1)
PROT SERPL-MCNC: 7 G/DL (ref 6.3–8.2)
RBC # BLD AUTO: 4.64 M/UL (ref 4.05–5.2)
SERVICE CMNT-IMP: NORMAL
SERVICE CMNT-IMP: NORMAL
SODIUM SERPL-SCNC: 141 MMOL/L (ref 136–145)
WBC # BLD AUTO: 16.6 K/UL (ref 4.3–11.1)

## 2021-01-14 PROCEDURE — 36415 COLL VENOUS BLD VENIPUNCTURE: CPT

## 2021-01-14 PROCEDURE — 74011250636 HC RX REV CODE- 250/636: Performed by: INTERNAL MEDICINE

## 2021-01-14 PROCEDURE — 74011000258 HC RX REV CODE- 258: Performed by: INTERNAL MEDICINE

## 2021-01-14 PROCEDURE — 94762 N-INVAS EAR/PLS OXIMTRY CONT: CPT

## 2021-01-14 PROCEDURE — 94640 AIRWAY INHALATION TREATMENT: CPT

## 2021-01-14 PROCEDURE — 74011250637 HC RX REV CODE- 250/637: Performed by: INTERNAL MEDICINE

## 2021-01-14 PROCEDURE — 77010033711 HC HIGH FLOW OXYGEN

## 2021-01-14 PROCEDURE — 82962 GLUCOSE BLOOD TEST: CPT

## 2021-01-14 PROCEDURE — 99232 SBSQ HOSP IP/OBS MODERATE 35: CPT | Performed by: INTERNAL MEDICINE

## 2021-01-14 PROCEDURE — 80053 COMPREHEN METABOLIC PANEL: CPT

## 2021-01-14 PROCEDURE — 85025 COMPLETE CBC W/AUTO DIFF WBC: CPT

## 2021-01-14 PROCEDURE — 74011636637 HC RX REV CODE- 636/637: Performed by: INTERNAL MEDICINE

## 2021-01-14 PROCEDURE — 65270000029 HC RM PRIVATE

## 2021-01-14 PROCEDURE — 74011000250 HC RX REV CODE- 250: Performed by: INTERNAL MEDICINE

## 2021-01-14 PROCEDURE — 85379 FIBRIN DEGRADATION QUANT: CPT

## 2021-01-14 RX ORDER — SIMETHICONE 80 MG
80 TABLET,CHEWABLE ORAL
Status: DISCONTINUED | OUTPATIENT
Start: 2021-01-14 | End: 2021-01-16 | Stop reason: HOSPADM

## 2021-01-14 RX ORDER — POLYETHYLENE GLYCOL 3350 17 G/17G
17 POWDER, FOR SOLUTION ORAL DAILY PRN
Status: DISCONTINUED | OUTPATIENT
Start: 2021-01-14 | End: 2021-01-16 | Stop reason: HOSPADM

## 2021-01-14 RX ORDER — DOXYCYCLINE 100 MG/1
100 CAPSULE ORAL EVERY 12 HOURS
Status: COMPLETED | OUTPATIENT
Start: 2021-01-14 | End: 2021-01-16

## 2021-01-14 RX ORDER — DOCUSATE SODIUM 100 MG/1
100 CAPSULE, LIQUID FILLED ORAL
Status: DISCONTINUED | OUTPATIENT
Start: 2021-01-14 | End: 2021-01-16 | Stop reason: HOSPADM

## 2021-01-14 RX ADMIN — DEXAMETHASONE SODIUM PHOSPHATE 6 MG: 10 INJECTION INTRAMUSCULAR; INTRAVENOUS at 14:00

## 2021-01-14 RX ADMIN — INSULIN LISPRO 2 UNITS: 100 INJECTION, SOLUTION INTRAVENOUS; SUBCUTANEOUS at 08:43

## 2021-01-14 RX ADMIN — Medication 1000 MG: at 20:34

## 2021-01-14 RX ADMIN — Medication 1000 MG: at 16:00

## 2021-01-14 RX ADMIN — IBUPROFEN 800 MG: 800 TABLET ORAL at 19:30

## 2021-01-14 RX ADMIN — POLYETHYLENE GLYCOL 3350 17 G: 17 POWDER, FOR SOLUTION ORAL at 08:44

## 2021-01-14 RX ADMIN — SODIUM CHLORIDE 50 ML: 900 INJECTION, SOLUTION INTRAVENOUS at 13:05

## 2021-01-14 RX ADMIN — DEXAMETHASONE SODIUM PHOSPHATE 6 MG: 10 INJECTION INTRAMUSCULAR; INTRAVENOUS at 21:00

## 2021-01-14 RX ADMIN — ENOXAPARIN SODIUM 30 MG: 30 INJECTION SUBCUTANEOUS at 08:44

## 2021-01-14 RX ADMIN — DOXYCYCLINE 100 MG: 100 INJECTION, POWDER, LYOPHILIZED, FOR SOLUTION INTRAVENOUS at 01:01

## 2021-01-14 RX ADMIN — FUROSEMIDE 20 MG: 10 INJECTION, SOLUTION INTRAMUSCULAR; INTRAVENOUS at 20:35

## 2021-01-14 RX ADMIN — INSULIN LISPRO 6 UNITS: 100 INJECTION, SOLUTION INTRAVENOUS; SUBCUTANEOUS at 21:31

## 2021-01-14 RX ADMIN — NIFEDIPINE 20 MG: 10 CAPSULE ORAL at 08:44

## 2021-01-14 RX ADMIN — DOCUSATE SODIUM 100 MG: 100 CAPSULE ORAL at 08:44

## 2021-01-14 RX ADMIN — NIFEDIPINE 20 MG: 10 CAPSULE ORAL at 20:33

## 2021-01-14 RX ADMIN — FUROSEMIDE 20 MG: 10 INJECTION, SOLUTION INTRAMUSCULAR; INTRAVENOUS at 08:44

## 2021-01-14 RX ADMIN — DOXYCYCLINE HYCLATE 100 MG: 100 CAPSULE ORAL at 20:34

## 2021-01-14 RX ADMIN — ALBUTEROL SULFATE 2 PUFF: 108 INHALANT RESPIRATORY (INHALATION) at 22:17

## 2021-01-14 RX ADMIN — Medication 220 MG: at 08:43

## 2021-01-14 RX ADMIN — REMDESIVIR 100 MG: 100 INJECTION, POWDER, LYOPHILIZED, FOR SOLUTION INTRAVENOUS at 17:32

## 2021-01-14 RX ADMIN — Medication 1000 MG: at 08:43

## 2021-01-14 RX ADMIN — NIFEDIPINE 20 MG: 10 CAPSULE ORAL at 16:10

## 2021-01-14 RX ADMIN — DEXAMETHASONE SODIUM PHOSPHATE 6 MG: 10 INJECTION INTRAMUSCULAR; INTRAVENOUS at 06:00

## 2021-01-14 RX ADMIN — DOXYCYCLINE 100 MG: 100 INJECTION, POWDER, LYOPHILIZED, FOR SOLUTION INTRAVENOUS at 13:05

## 2021-01-14 RX ADMIN — CEFTRIAXONE SODIUM 2 G: 2 INJECTION, POWDER, FOR SOLUTION INTRAMUSCULAR; INTRAVENOUS at 13:05

## 2021-01-14 RX ADMIN — ENOXAPARIN SODIUM 30 MG: 30 INJECTION SUBCUTANEOUS at 20:35

## 2021-01-14 RX ADMIN — NIFEDIPINE 20 MG: 10 CAPSULE ORAL at 01:01

## 2021-01-14 NOTE — LACTATION NOTE
This note was copied from a baby's chart. Spoke with Dr Selene Razo earlier and she stated that she had face timed with infant's mother. She stated that she has not felt like pumping but informed Dr Selene Razo that she was going to try to start today. Dr Selene Razo wanted lactation consultant to verify if mom's medications were compatible with mom providing her breastmilk. I did look up in Dr Fabien Phoenix' book Medication and Mother's Milk that all medications listed here are compatible with mom providing her breastmilk. Rocephin L1, Decadron L3, Benadryl L3, Lovenox L2, Lasix L3, Lopressor L2, Procardia L2, Phenergan L3 and Vibramycin L3 but long time use is not recommended. Also referred to lactation consultant's note that was written on January 10 in regards to Remdesivir. Attempted to call patient on her personal phone to discuss wearing a mask, cleansing her hands and breasts prior to pumping and how to store as well as having family members transport the expressed breastmilk safely to the [facility] for her infant. She did not answer and I was unable to leave her a message.

## 2021-01-14 NOTE — PROGRESS NOTES
[facility] Critical Care COVID-19 Note:    Critical Care Note: 1/13/2021    [place] Blaine  Admission Date: 1/10/2021     Length of Stay: 4 days     Background: 31 y.o. y/o female with acute hypoxemic respiratory failure secondary to COVID-19. Date of COVID-19 symptom onset: 1/7/21    Notable PMH: CSection on 1-9-21 with delivery of female baby, Pre-E  Drug Allergies: No Known Allergies    HPI:  Ms. Scar Hill is a 36 yo female with PMH of [place] since 1-7-21 admitted to OB s/p CSection on 1-9-21 with delivery of female baby. She has been on room air until 1-10-21. CXR shows bilateral infiltrates. CTA chest no PE though bilateral infiltrates.      Transferred to [facility] from [address] [place] [address] d/t covid status     1/11 - reports depression, lack of appetite. Remains on high flow oxygen 10-12 L HF  1/12 - increased to AirVo with NRB. Given lasix x 1 this morning, started on Lovenox. She states she feels better and wants to walk around though is on 100% AirVo with NRB and sat 94%. She states other than the low oxygen she thinks she feels better than this morning or yesterday. She denies pain. She hasn't noted any chest congestion or cough. 1/13 - feeling better today. Has taken NRB mask off, remains with Airvo, O2 sat remains 100%, so weaned airvo to 60L. 90% and O2 sat remains %. 1/14--oxygenation improved. Airvo down to 60L/60% today. 1/15-cont to improve with oxygenation. Weaned to 60L/47%, tolerated well with no desats noted, even with conversation. Says she is feeling overall better. REVIEW OF SYSTEMS:  Constitutional:  no fever, No chills, night sweats, weight loss, weight gain  Eyes:  Denies problems with eye pain, erythema, blurred vision, or visual field loss. ENTM:  Denies sore throat, no loss of taste or smell  Lymph:  Denies swollen glands. Cardiac:  No chest pain, pressure, discomfort, palpitations, orthopnea, murmurs, or edema.   GI:  No dysphagia, heartburn reflux, nausea/vomiting, diarrhea, abdominal pain, or bleeding. :Denies history of dysuria, hematuria, polyuria, or decreased urine output. MS:  No history of myalgias, arthralgias, bone pain, or muscle cramps. Skin:  No history of rashes, jaundice, cyanosis, nodules, or ulcers. Endo:  Negative for heat or cold intolerance. No polyuria/polydipsia  Psych:  +anxiety, No depression, insomnia, hallucinations. Neuro: There is no history of AMS, persistent headache, decreased level of consciousness, seizures, or motor or sensory deficits. Visit Vitals  BP (!) 131/93 (BP 1 Location: Right arm, BP Patient Position: At rest)   Pulse (!) 109   Temp 98 °F (36.7 °C)   Resp 20   SpO2 97%     I/O:    Intake/Output Summary (Last 24 hours) at 1/13/2021 0957  Last data filed at 1/12/2021 2304  Gross per 24 hour   Intake 240 ml   Output 2700 ml   Net -2460 ml      Pertinent Exam:            Constitutional: awake, alert, appears comfortable  EENMT:  Sclera clear, pupils equal, oral mucosa moist  Respiratory: faint crackles bilaterally  Cardiovascular:  RRR with no M,G,R;  Gastrointestinal:  soft with no tenderness; positive bowel sounds present  Musculoskeletal:  warm with no cyanosis, no lower extremity edema  Skin:  no jaundice or ecchymosis  Neurologic: no gross neuro deficits     Psychiatric: calm, anxious to see child. CXR:   1/14/21  none    1/12/21  bilateral infiltrates, somewhat better perhaps than prior      Lines (insertion date):    PIV  External catheter    Drips: current dose (range)  None    COVID-19 Meds:  Dexamethasone 6mg daily (1/11)  Remdesivir (1/12; 5-10 days)  Convalescent plasma transfused 1/11    SARS-CoV-2 LAB Results    Microbiology Results  Date  Source Culture Result   1/7/21 urine NGTD   1/9/21 Blood x 2 NGTD     Anti-infectives (start date):   Rocephin - 1/11 - 1/15  Doxy - 1/11 -    Pertinent Labs:   Recent Results (from the past 12 hour(s))   D DIMER    Collection Time: 01/14/21  3:26 AM   Result Value Ref Range    D DIMER 2.68 (H) <0.56 ug/ml(FEU)   CBC WITH AUTOMATED DIFF    Collection Time: 01/14/21  3:26 AM   Result Value Ref Range    WBC 16.6 (H) 4.3 - 11.1 K/uL    RBC 4.64 4.05 - 5.2 M/uL    HGB 11.4 (L) 11.7 - 15.4 g/dL    HCT 36.3 35.8 - 46.3 %    MCV 78.2 (L) 79.6 - 97.8 FL    MCH 24.6 (L) 26.1 - 32.9 PG    MCHC 31.4 31.4 - 35.0 g/dL    RDW 20.4 (H) 11.9 - 14.6 %    PLATELET 198 (H) 374 - 450 K/uL    MPV 10.0 9.4 - 12.3 FL    ABSOLUTE NRBC 0.04 0.0 - 0.2 K/uL    DF AUTOMATED      NEUTROPHILS 74 43 - 78 %    LYMPHOCYTES 18 13 - 44 %    MONOCYTES 5 4.0 - 12.0 %    EOSINOPHILS 0 (L) 0.5 - 7.8 %    BASOPHILS 0 0.0 - 2.0 %    IMMATURE GRANULOCYTES 3 0.0 - 5.0 %    ABS. NEUTROPHILS 12.3 (H) 1.7 - 8.2 K/UL    ABS. LYMPHOCYTES 2.9 0.5 - 4.6 K/UL    ABS. MONOCYTES 0.8 0.1 - 1.3 K/UL    ABS. EOSINOPHILS 0.0 0.0 - 0.8 K/UL    ABS. BASOPHILS 0.0 0.0 - 0.2 K/UL    ABS. IMM. GRANS. 0.5 0.0 - 0.5 K/UL   METABOLIC PANEL, COMPREHENSIVE    Collection Time: 01/14/21  3:26 AM   Result Value Ref Range    Sodium 141 136 - 145 mmol/L    Potassium 3.5 3.5 - 5.1 mmol/L    Chloride 108 (H) 98 - 107 mmol/L    CO2 25 21 - 32 mmol/L    Anion gap 8 7 - 16 mmol/L    Glucose 152 (H) 65 - 100 mg/dL    BUN 28 (H) 6 - 23 MG/DL    Creatinine 0.65 0.6 - 1.0 MG/DL    GFR est AA >60 >60 ml/min/1.73m2    GFR est non-AA >60 >60 ml/min/1.73m2    Calcium 9.0 8.3 - 10.4 MG/DL    Bilirubin, total 0.3 0.2 - 1.1 MG/DL    ALT (SGPT) 21 12 - 65 U/L    AST (SGOT) 47 (H) 15 - 37 U/L    Alk.  phosphatase 124 50 - 136 U/L    Protein, total 7.0 6.3 - 8.2 g/dL    Albumin 2.3 (L) 3.5 - 5.0 g/dL    Globulin 4.7 (H) 2.3 - 3.5 g/dL    A-G Ratio 0.5 (L) 1.2 - 3.5     GLUCOSE, POC    Collection Time: 01/14/21  8:28 AM   Result Value Ref Range    Glucose (POC) 166 (H) 65 - 100 mg/dL     Recent Labs     01/13/21  0534 01/12/21  0707 01/12/21  0105 01/11/21  0400   WBC 14.2* 14.0* 13.6* 12.2*   HGB 11.1* 11.2* 11.1* 9.7*   HCT 34.6* 35.5* 35.1* 30.4*    315 280 207     Recent Labs 21  0534 21  0707 21  0105    138 140   K 3.6 3.7 3.5    106 108*   CO2 25 26 26   * 102* 134*   BUN 19 8 7   CREA 0.59* 0.50* 0.46*   MG  --   --  1.5*   CA 9.2 8.8 8.4   PHOS  --   --  3.7   ALB 2.1* 2.1* 2.0*   TBILI 0.4 0.3 0.2   ALT 25 26 25     Recent Labs     21  1458 21  1051   GLUCPOC 209* 212* 173*       Oxygen Requirements:  Date O2 support mode FiO2 SaO2   21 Airvo and NRB 60L/100% 97%     IMPRESSION:  Active Hospital Problems    Diagnosis Date Noted    Tachycardia 2021    H/O  section 2021    SOB (shortness of breath) 2021    Pneumonia due to COVID-19 virus 2021    COVID-19 affecting pregnancy in third trimester 2021     Sx 21  Positive 21  Precautions through 21         31 y.o. y/o female with acute hypoxemic respiratory failure secondary to COVID-19. PLAN:  1. Acute respiratory failure with hypoxia: supportive measures with noninvasive oxygen strategy unless tiring or desaturation. Wean as tolerated. Down to 60L/60%, cont to wean as tolerated. Responding to lasix so plan to continue for now. With recent Pre-E some of her infiltrates could be pulmonary edema. >>CRP 15.9 and pro- ()>>likley related to inflammatory process,  cont steroids   >>WBC up to 16.6 >>likely related to inflammatory process and steroids. Neutrophils marginally elevated, cont current treatment   >> Dimer was 2.68 () and PCT was 1.37 () so agree with steroids and  anticoagulation doses. 2. COVID-19 pneumonia:  Decadron, Convalescent plasma, remdesivir as outlined above. 3. Nutrition: eat as tolerated  4. PPx:  DVT ppx ongoing. H2 blocker with decadron. Full Code    Lungs:  Mild crackles, AirVo 47%, 60L  Heart:  RRR with no Murmur/Rubs/Gallops    Additional Comments:  Continues support, wean O2, Continue diuresis as tolerated. Continue Dexa x 10 days.  She is improving steadily and we will sign off for now. Call with questions. I have spoken with and examined the patient. I agree with the above assessment and plan as documented.     Mackenzie Cobian MD

## 2021-01-14 NOTE — PROGRESS NOTES
Progress Note    Patient: Pato King MRN: [id number]  SSN: [id number]    YOB: 1985  Age: 31 y.o. Sex: female      Admit Date: 1/10/2021    LOS: 4 days     Subjective:     Patient with past medical history of    COVID infection since 2021  Pre-eclampsia     Admitted to [facility] s/p  section on 2021     On room air until 1-. CXR shows bilateral infiltrates. Still on oxygen 60 LPM. Pulmonary is consulted. Patient is subjectively feeling better. Objective:     Vitals:    21 2212 21 2236 21 0249 21 0800   BP: 124/84  128/78 (!) 138/90   Pulse: (!) 111  (!) 111 (!) 104   Resp: 18  18 16   Temp: 97.6 °F (36.4 °C)  98 °F (36.7 °C) 97.7 °F (36.5 °C)   SpO2: 98% 93% 92% 99%        Intake and Output:  Current Shift: 701 - 1900  In: 200 [P.O.:200]  Out: 400 [Urine:400]  Last three shifts:  190 -  0700  In: 880 [P.O.:880]  Out: 1200 [Urine:1200]    Physical Exam:     General:                    The patient is a pleasant female in acute respiratory distress. On high flow oxygen   Head:                                   Normocephalic/atraumatic. Eyes:                                   No palpebral pallor or scleral icterus. ENT:                                    External auricular and nasal exam within normal limits. Mucous membranes are moist.  Neck:                                   Supple, non-tender, no JVD. Lungs:                       diminished to auscultation bilaterally without wheezes or crackles. No respiratory distress or accessory muscle use. Heart:                                  Regular rate and rhythm, without murmurs, rubs, or gallops. Abdomen:                  Soft, non-tender, non-distended with normoactive bowel sounds. Genitourinary:           No tenderness over the bladder or bilateral CVAs.   Extremities: Without clubbing, cyanosis, or edema. Skin:                                    Normal color, texture, and turgor. No rashes, lesions, or jaundice. Pulses:                      Radial and dorsalis pedis pulses present 2+ bilaterally. Capillary refill <2s. Neurologic:                CN II-XII grossly intact and symmetrical.                                               Moving all four extremities well with no focal deficits. Psychiatric:                Pleasant demeanor, appropriate affect. Alert and oriented x 3      Lab/Data Review:    Recent Results (from the past 24 hour(s))   GLUCOSE, POC    Collection Time: 01/13/21 11:38 AM   Result Value Ref Range    Glucose (POC) 157 (H) 65 - 100 mg/dL   GLUCOSE, POC    Collection Time: 01/13/21  3:24 PM   Result Value Ref Range    Glucose (POC) 195 (H) 65 - 100 mg/dL   GLUCOSE, POC    Collection Time: 01/13/21  8:51 PM   Result Value Ref Range    Glucose (POC) 137 (H) 65 - 100 mg/dL   D DIMER    Collection Time: 01/14/21  3:26 AM   Result Value Ref Range    D DIMER 2.68 (H) <0.56 ug/ml(FEU)   CBC WITH AUTOMATED DIFF    Collection Time: 01/14/21  3:26 AM   Result Value Ref Range    WBC 16.6 (H) 4.3 - 11.1 K/uL    RBC 4.64 4.05 - 5.2 M/uL    HGB 11.4 (L) 11.7 - 15.4 g/dL    HCT 36.3 35.8 - 46.3 %    MCV 78.2 (L) 79.6 - 97.8 FL    MCH 24.6 (L) 26.1 - 32.9 PG    MCHC 31.4 31.4 - 35.0 g/dL    RDW 20.4 (H) 11.9 - 14.6 %    PLATELET 443 (H) 495 - 450 K/uL    MPV 10.0 9.4 - 12.3 FL    ABSOLUTE NRBC 0.04 0.0 - 0.2 K/uL    DF AUTOMATED      NEUTROPHILS 74 43 - 78 %    LYMPHOCYTES 18 13 - 44 %    MONOCYTES 5 4.0 - 12.0 %    EOSINOPHILS 0 (L) 0.5 - 7.8 %    BASOPHILS 0 0.0 - 2.0 %    IMMATURE GRANULOCYTES 3 0.0 - 5.0 %    ABS. NEUTROPHILS 12.3 (H) 1.7 - 8.2 K/UL    ABS. LYMPHOCYTES 2.9 0.5 - 4.6 K/UL    ABS. MONOCYTES 0.8 0.1 - 1.3 K/UL    ABS. EOSINOPHILS 0.0 0.0 - 0.8 K/UL    ABS. BASOPHILS 0.0 0.0 - 0.2 K/UL    ABS. IMM. GRANS. 0.5 0.0 - 0.5 K/UL   METABOLIC PANEL, COMPREHENSIVE    Collection Time: 01/14/21  3:26 AM   Result Value Ref Range    Sodium 141 136 - 145 mmol/L    Potassium 3.5 3.5 - 5.1 mmol/L    Chloride 108 (H) 98 - 107 mmol/L    CO2 25 21 - 32 mmol/L    Anion gap 8 7 - 16 mmol/L    Glucose 152 (H) 65 - 100 mg/dL    BUN 28 (H) 6 - 23 MG/DL    Creatinine 0.65 0.6 - 1.0 MG/DL    GFR est AA >60 >60 ml/min/1.73m2    GFR est non-AA >60 >60 ml/min/1.73m2    Calcium 9.0 8.3 - 10.4 MG/DL    Bilirubin, total 0.3 0.2 - 1.1 MG/DL    ALT (SGPT) 21 12 - 65 U/L    AST (SGOT) 47 (H) 15 - 37 U/L    Alk. phosphatase 124 50 - 136 U/L    Protein, total 7.0 6.3 - 8.2 g/dL    Albumin 2.3 (L) 3.5 - 5.0 g/dL    Globulin 4.7 (H) 2.3 - 3.5 g/dL    A-G Ratio 0.5 (L) 1.2 - 3.5     GLUCOSE, POC    Collection Time: 01/14/21  8:28 AM   Result Value Ref Range    Glucose (POC) 166 (H) 65 - 100 mg/dL     Results     Procedure Component Value Units Date/Time    CULTURE, BLOOD [[id number]] Collected: 01/09/21 0950    Order Status: Completed Specimen: Blood Updated: 01/14/21 0739     Special Requests: --        LEFT  HAND       Culture result: NO GROWTH 5 DAYS       CULTURE, BLOOD [[id number]] Collected: 01/09/21 0940    Order Status: Completed Specimen: Blood Updated: 01/14/21 0739     Special Requests: --        LEFT  ARM       Culture result: NO GROWTH 5 DAYS       INFLUENZA A & B AG (RAPID TEST) [[id number]] Collected: 01/07/21 0512    Order Status: Completed Specimen: Nasopharyngeal from Nasal washing Updated: 01/07/21 0530     Influenza A Ag Negative        Comment: NEGATIVE FOR THE PRESENCE OF INFLUENZA A ANTIGEN  INFECTION DUE TO INFLUENZA A CANNOT BE RULED OUT. BECAUSE THE ANTIGEN PRESENT IN THE SAMPLE MAY BE BELOW  THE DETECTION LIMIT OF THE TEST. A NEGATIVE TEST IS PRESUMPTIVE AND IT IS RECOMMENDED THAT THESE RESULTS BE CONFIRMED BY VIRAL CULTURE OR AN FDA-CLEARED INFLUENZA A AND B MOLECULAR ASSAY.           Influenza B Ag Negative Comment: NEGATIVE FOR THE PRESENCE OF INFLUENZA B ANTIGEN  INFECTION DUE TO INFLUENZA B CANNOT BE RULED OUT. BECAUSE THE ANTIGEN PRESENT IN THE SAMPLE MAY BE BELOW  THE DETECTION LIMIT OF THE TEST. A NEGATIVE TEST IS PRESUMPTIVE AND IT IS RECOMMENDED THAT THESE RESULTS BE CONFIRMED BY VIRAL CULTURE OR AN FDA-CLEARED INFLUENZA A AND B MOLECULAR ASSAY. Source Nasopharyngeal       CULTURE, URINE [[id number]] Collected: 01/07/21 0512    Order Status: Completed Specimen: Cath Urine Updated: 01/09/21 0724     Special Requests: NO SPECIAL REQUESTS        Culture result: NO GROWTH 2 DAYS       CULTURE, GENITAL GROUP B STREP [[id number]]  (Abnormal) Collected: 01/07/21 0447    Order Status: Completed Specimen: VAGINAL/RECTAL Updated: 01/11/21 0952     Special Requests: NO SPECIAL REQUESTS        Culture result:       STREPTOCOCCI, BETA HEMOLYTIC GROUP B                  GROUP B STREPTOCOCCI REMAIN UNIVERSALLY SUSCEPTIBLE TO PENICILLIN, AMPICILLIN, AND CEFAZOLIN. RESISTANCE TO CLINDAMYCIN AND ERYTHROMYCIN CAN OCCUR. PLEASE CONTACT LABORATORY WITHIN 48 HOURS IF ERYTHROMYCIN AND CLINDAMYCIN SUSCEPTIBILITY TESTING IS NEEDED. GRP B STREP SCRN BY PCR [[id number]]  (Abnormal) Collected: 01/07/21 0447    Order Status: Completed Specimen: VAGINAL/RECTAL Updated: 01/07/21 0644     Special Requests: NO SPECIAL REQUESTS        Culture result:       POSITIVE: GBS target nucleic acid is detected. Presumptive for GBS colonization. RESULTS VERIFIED, PHONED TO AND READ BACK BY  WES AMADO [address] ON 1/7/21 HA      CULTURE, URINE [[id number]] Collected: 01/07/21 0344    Order Status: Canceled Specimen: Urine from Clean catch         CT chest   1-9-2021  Impression:   1. No evidence of PE.  2. Bilateral infiltrates in keeping with Covid-19, similar to recent  radiography. 3. Trace pericardial effusion and trace left pleural effusion.     XR chest  1-  Impression:       1. Slightly improving aeration with decreasing consolidative changes along the  peripheral margin of left lung.     TTE   1-    SUMMARY:     -  Left ventricle: Systolic function was normal. Ejection fraction was  estimated in the range of 60 % to 65 %.  There were no regional wall motion  abnormalities.         Current Facility-Administered Medications:     albuterol (PROVENTIL HFA, VENTOLIN HFA, PROAIR HFA) inhaler 4 Puff, 4 Puff, Inhalation, Q6H PRN, González Cleary MD    albuterol (PROVENTIL HFA, VENTOLIN HFA, PROAIR HFA) inhaler 2 Puff, 2 Puff, Inhalation, TID RT, González Cleary MD, 2 Puff at 01/13/21 2236    doxycycline (VIBRAMYCIN) 100 mg in 0.9% sodium chloride (MBP/ADV) 100 mL MBP, 100 mg, IntraVENous, Q12H, Judson Ng MD, Last Rate: 100 mL/hr at 01/14/21 0101, 100 mg at 01/14/21 0101    dexamethasone (DECADRON) 10 mg/mL injection 6 mg, 6 mg, IntraVENous, Q8H, Judy Jose, DO, 6 mg at 01/14/21 0600    insulin lispro (HUMALOG) injection 0-10 Units, 0-10 Units, SubCUTAneous, AC&HS, Mell Jose Agueda, DO, 2 Units at 01/14/21 0843    [COMPLETED] remdesivir 200 mg in 0.9% sodium chloride 250 mL IVPB, 200 mg, IntraVENous, ONCE, 200 mg at 01/12/21 1132 **FOLLOWED BY** remdesivir 100 mg in 0.9% sodium chloride 250 mL IVPB, 100 mg, IntraVENous, Q24H, JoseElieJudy C, DO, 100 mg at 01/13/21 1158    enoxaparin (LOVENOX) injection 30 mg, 30 mg, SubCUTAneous, Q12H, Jose, Judy C, DO, 30 mg at 01/14/21 0844    0.9% sodium chloride infusion 50 mL remdesivir flush, 50 mL, IntraVENous, Q24H, Jose, Judy C, DO, 50 mL at 01/12/21 1200    dextrose 40% (GLUTOSE) oral gel 1 Tube, 15 g, Oral, PRN, Judy Jose, DO    glucagon (GLUCAGEN) injection 1 mg, 1 mg, IntraMUSCular, PRN, Judy Jose, DO    dextrose (D50W) injection syrg 12.5-25 g, 25-50 mL, IntraVENous, PRN, Judy Jose, DO    furosemide (LASIX) injection 20 mg, 20 mg, IntraVENous, Q12H, Gretel Hester MD, 20 mg at 01/14/21 0823   diphenhydrAMINE (BENADRYL) injection 12.5 mg, 12.5 mg, IntraVENous, Q6H PRN, Debbie Mckee MD    promethazine (PHENERGAN) with saline injection 6.25 mg, 6.25 mg, IntraVENous, Q6H PRN, Debbie Mckee MD    simethicone (MYLICON) tablet 80 mg, 80 mg, Oral, AC&HS, Nanci Mckeon MD, 80 mg at 21 1142    0.9% sodium chloride infusion 250 mL, 250 mL, IntraVENous, PRN, Debbie Mckee MD    ascorbic acid (vitamin C) (VITAMIN C) tablet 1,000 mg, 1,000 mg, Oral, TID, Brooke Mckee MD, 1,000 mg at 21 0843    cefTRIAXone (ROCEPHIN) 2 g in 0.9% sodium chloride (MBP/ADV) 50 mL MBP, 2 g, IntraVENous, Q24H, Brooke Mckee MD, Last Rate: 100 mL/hr at 21 1254, 2 g at 21 1254    docusate sodium (COLACE) capsule 100 mg, 100 mg, Oral, BID, Brooke Mckee MD, 100 mg at 21 0844    ibuprofen (MOTRIN) tablet 800 mg, 800 mg, Oral, Q6H PRN, Brooke Mckee MD, 800 mg at 21 194    NIFEdipine (PROCARDIA) capsule 20 mg, 20 mg, Oral, Q6H, Brooke Mckee MD, 20 mg at 21 0844    polyethylene glycol (MIRALAX) packet 17 g, 17 g, Oral, DAILY, Brooke Mckee MD, 17 g at 21 0844    zinc sulfate tablet 220 mg, 220 mg, Oral, DAILY, Brooke Mckee MD, 220 mg at 21 0843    metoprolol (LOPRESSOR) injection 5 mg, 5 mg, IntraVENous, Q6H PRN, AlaAlfred MD, 5 mg at 21    Assessment:     Principal Problem:    COVID-19 affecting pregnancy in third trimester (2021)      Overview: Sx 21      Positive 21      Precautions through 21    Active Problems:    Tachycardia (2021)      H/O  section (2021)      SOB (shortness of breath) (2021)      Pneumonia due to COVID-19 virus (2021)      Acute respiratory failure with hypoxia ([facility] [address] (2021)        Plan:     Acute respiratory failure with hypoxia   COVID pneumonia   S/P convalescent plasma, Decadron  Continue oxygen supplementation and wean as tolerated. Empiric IV antibiotics. Patient declines Remdesivir. Still on 60 LPM oxygen. Wean as tolerated. Pre-eclampsia   Continue Procardia. Monitor BP. BP is controlled now. Post partum   S/P  section     I have discussed the plan of care with patient.       DVT prophylaxis : SCD     Signed By: Montserrat Aaron MD     2021

## 2021-01-14 NOTE — PROGRESS NOTES
As requested by pediatrician,    staff  Ms. Velásquez Abbi with a FaceTime visit with pt's baby, baby's father, pediatrician and RN. Keyonna Ragsdale was at [facility] to facilitate FaceTime visit from that end.  remained outside the room due to droplet precautions. CNA/unit secretary and RN assisted with iPad in patient's room. After RN retrieved iPad, she shared that pt was tearful as she talked about seeing baby and being  from baby at this time. Chaplains will continue to assist with facilitating FaceTime visits, as needed/desired.     Jo Chino MDiv, [address] [place] [address]

## 2021-01-14 NOTE — PROGRESS NOTES
Patient wanted her incision looked at; it looks great, no redness or drainage and approximated nicely. She also says both her legs are tinging and asked for the SCDs put back on. She said they felt that way before when she was dehydrated. Legs are warm to touch, no noticeable swelling. She says her abd felt bloated; encouraged the mylicon and she agreed and took it.

## 2021-01-14 NOTE — PROGRESS NOTES
Tolerating airvo 60/60  Hourly rounds completed. Resting in bed. Denies needs or pain at this time. Bed in low locked position. Call light and personal items within reach. Will continue to monitor and give report to oncoming day shift nurse.

## 2021-01-14 NOTE — ROUTINE PROCESS
Respiratory Care Services     Policy Number: [id number]    Title: Oxygen Protocol    Effective Date: 01/1996    Revised Date: 06/2013, 02/29/2016, 4/2018, 7/2019    Reviewed Date: 05/2014, 03/2015, 06/2017, 11/2020        I. Policy: The Oxygen Protocol will be initiated for all patients upon written order from physician for administration of oxygen therapy or if a patient is found to have an oxygen saturation of 88% or less. Special consideration: the goal of oxygen therapy for COPD patients is to maintain oxygen saturation between 88% - 92% to comply with GOLD Guidelines. II. Purpose: To provide protocol driven respiratory therapy for the administration of oxygen at concentrations greater than that in ambient air with the intent of treating or preventing the symptoms and manifestations of hypoxia. III. Responsibility: Director Respiratory Care Services, all Respiratory Care Practitioners     IV. Indications:   A. Implement this protocol for patients when physician orders oxygen to be administered or when patient is found to have an oxygen saturation of 88% or less. B. To assure routine monitoring of patient's oxygen saturation b.i.d. and to make appropriate adjustments in accordance with ordered oxygen saturation parameters. C. To assure continuity of respiratory care that meets [facility] Clinical Practice Guidelines and GOLD Guidelines. D. Hb < 8  E. Sickle Cell anemia crisis    V. Assessment:  Assess the following parameters to determine the need to adjust oxygen:  A. Measurement of patient's oxygen saturation via pulse oximetry. B. Observation of patient's color, respiratory effort, and responsiveness. C. Measurement of heart rate and respiratory rate. D. Complete a three-step ambulatory oxygen saturation when ordered. VI. Initiation:  Upon receipt of an order for oxygen, the RCP will:   A.  Verify order in the patient's EMR, which should include the desired oxygen saturation to be maintained. B. The patient shall be placed on oxygen with humidity (except for those oxygen delivery devices that do not require humidity, i.e. venturi masks and non-rebreather masks) as ordered by the physician to achieve the prescribed oxygen saturation. C. In the event that no saturation is specified, a saturation of 90% will be maintained. D. Patients, who are found to have a SaO2 of 88% or less, may be started on supplemental oxygen as described above. E. Patients admitted with cardiac problems/disease shall be maintained at 92% per Chest Committee recommendation. F. The patient will be informed of the \"no smoking policy\" and instructed in the proper use of oxygen therapy. G. Once desired oxygen saturation has been achieved, the RCP will document FIO2 and oxygen saturation in the respiratory section of the patient's EMR. VII. Maintenance:   A. 30-second oxygen saturation check will be taken to maintain the saturation ordered by the physician each day. B. Patients will be assessed each shift and as needed by pulse oximetry to determine if oxygen needs to be decreased, increased or discontinued. C. If changes in FIO2 are indicated, all changes will be documented in the respiratory section of the patient's EMR. D. If no changes in FIO2 are required, the patient's oxygen flow rate and saturation will be recorded in the respiratory section of the patient's EMR. E. Per [facility], patients who are receiving oxygen therapy but are not on oxygen at home, should be weaned off oxygen as soon as possible or when anticipated discharge becomes evident. Rosilyn [place] will be discontinued after oxygen saturation has been maintained for 24 hours on room air and documented in the patient's EMR. G. Patients on the Inpatient Rehabilitation area on 9th floor will be exempt from having their oxygen discontinued per protocol.  Oxygen may be weaned but will be changed to prn to meet the needs of the patient when exercising and participating in therapy.  H. The goal of oxygen therapy is to maintain patients with a diagnosis of COPD at oxygen saturation between 88% - 92% to comply with GOLD Guidelines.        VIII. Safety: RCP will address the following safety issues:  A. Identify patient using the two patient identifiers name and birth date via ID bracelet.  B. Perform hand hygiene per hospital policy utilizing Standard Precautions for all patients and following transmission-based isolation as indicated per hospital policy.  C. Cardiac patients will be maintained at an oxygen saturation of 92%.  D. If a patient's FIO2 requirements necessitate changing oxygen delivery devices to a high concentration of oxygen, documentation indicating the change must be included in the progress notes, as well as in the respiratory flowsheet.   E. If a patient has a hemoglobin level <8 mg. RCP will consult physician before discontinuing oxygen.    IX. Interventions:   A. RCP will assess patient for signs of respiratory distress or suspicion of CO2 retention.   B. An ABG may be obtained for patients exhibiting respiratory distress or sickle cell crisis.   C. An order should be entered into patient's EMR for ABG under “per protocol”.    X. Documentation  A. Document assessment findings in the respiratory section of the patient's EMR.  B. Document changes in therapy per protocol in the respiratory orders section and in the care plan section of the patient's EMR.  C. Document patient education in the patient education section of the patient's EMR.    XI. Reportable Conditions:  Report to the physician immediately:  A. Acute changes in patient's respiratory status.  B. An oxygen saturation <85%.  C. A change in oxygen delivery device to provide a high concentration of oxygen.    XII. Patient Instructions: Review with Patient  A. Purpose of oxygen therapy  B. Proper technique for using oxygen  C. No smoking policy    Approval: Pulmonary Committee  (1-25-96)  Revision: Chest Committee (4-28-05)    L - Respiratory Care Department Policy, Procedure and Protocol Guideline Manual, 1995,         ROSA Candelario. L - Therapist Driven Respiratory Care Protocols - A Practitioner's Guide for Criteria-Based       Respiratory Care by Kamar Rizvi M.D., and ROSA Caicedo RRT. L - The rationale for therapist-driven protocols: an update. Respiratory Care 1998. N - [facility] Clinical Practice Guidelines.

## 2021-01-15 LAB
ALBUMIN SERPL-MCNC: 2.1 G/DL (ref 3.5–5)
ALBUMIN/GLOB SERPL: 0.5 {RATIO} (ref 1.2–3.5)
ALP SERPL-CCNC: 121 U/L (ref 50–136)
ALT SERPL-CCNC: 23 U/L (ref 12–65)
ANION GAP SERPL CALC-SCNC: 9 MMOL/L (ref 7–16)
AST SERPL-CCNC: 31 U/L (ref 15–37)
BASOPHILS # BLD: 0 K/UL (ref 0–0.2)
BASOPHILS NFR BLD: 0 % (ref 0–2)
BILIRUB SERPL-MCNC: 0.3 MG/DL (ref 0.2–1.1)
BUN SERPL-MCNC: 32 MG/DL (ref 6–23)
CALCIUM SERPL-MCNC: 8.8 MG/DL (ref 8.3–10.4)
CHLORIDE SERPL-SCNC: 111 MMOL/L (ref 98–107)
CO2 SERPL-SCNC: 25 MMOL/L (ref 21–32)
CREAT SERPL-MCNC: 0.64 MG/DL (ref 0.6–1)
D DIMER PPP FEU-MCNC: 2.89 UG/ML(FEU)
DIFFERENTIAL METHOD BLD: ABNORMAL
EOSINOPHIL # BLD: 0 K/UL (ref 0–0.8)
EOSINOPHIL NFR BLD: 0 % (ref 0.5–7.8)
ERYTHROCYTE [DISTWIDTH] IN BLOOD BY AUTOMATED COUNT: 19.9 % (ref 11.9–14.6)
GLOBULIN SER CALC-MCNC: 4.2 G/DL (ref 2.3–3.5)
GLUCOSE BLD STRIP.AUTO-MCNC: 110 MG/DL (ref 65–100)
GLUCOSE BLD STRIP.AUTO-MCNC: 132 MG/DL (ref 65–100)
GLUCOSE BLD STRIP.AUTO-MCNC: 197 MG/DL (ref 65–100)
GLUCOSE SERPL-MCNC: 121 MG/DL (ref 65–100)
HCT VFR BLD AUTO: 35.5 % (ref 35.8–46.3)
HGB BLD-MCNC: 11.2 G/DL (ref 11.7–15.4)
IMM GRANULOCYTES # BLD AUTO: 0.4 K/UL (ref 0–0.5)
IMM GRANULOCYTES NFR BLD AUTO: 3 % (ref 0–5)
LYMPHOCYTES # BLD: 2.9 K/UL (ref 0.5–4.6)
LYMPHOCYTES NFR BLD: 20 % (ref 13–44)
MCH RBC QN AUTO: 24.8 PG (ref 26.1–32.9)
MCHC RBC AUTO-ENTMCNC: 31.5 G/DL (ref 31.4–35)
MCV RBC AUTO: 78.7 FL (ref 79.6–97.8)
MONOCYTES # BLD: 0.6 K/UL (ref 0.1–1.3)
MONOCYTES NFR BLD: 4 % (ref 4–12)
NEUTS SEG # BLD: 10.4 K/UL (ref 1.7–8.2)
NEUTS SEG NFR BLD: 73 % (ref 43–78)
NRBC # BLD: 0.03 K/UL (ref 0–0.2)
PLATELET # BLD AUTO: 507 K/UL (ref 150–450)
PMV BLD AUTO: 9.6 FL (ref 9.4–12.3)
POTASSIUM SERPL-SCNC: 3.6 MMOL/L (ref 3.5–5.1)
PROT SERPL-MCNC: 6.3 G/DL (ref 6.3–8.2)
RBC # BLD AUTO: 4.51 M/UL (ref 4.05–5.2)
SODIUM SERPL-SCNC: 145 MMOL/L (ref 136–145)
WBC # BLD AUTO: 14.3 K/UL (ref 4.3–11.1)

## 2021-01-15 PROCEDURE — 85025 COMPLETE CBC W/AUTO DIFF WBC: CPT

## 2021-01-15 PROCEDURE — 65270000029 HC RM PRIVATE

## 2021-01-15 PROCEDURE — 80053 COMPREHEN METABOLIC PANEL: CPT

## 2021-01-15 PROCEDURE — 74011000250 HC RX REV CODE- 250: Performed by: INTERNAL MEDICINE

## 2021-01-15 PROCEDURE — 74011250636 HC RX REV CODE- 250/636: Performed by: INTERNAL MEDICINE

## 2021-01-15 PROCEDURE — 94760 N-INVAS EAR/PLS OXIMETRY 1: CPT

## 2021-01-15 PROCEDURE — 85379 FIBRIN DEGRADATION QUANT: CPT

## 2021-01-15 PROCEDURE — 77010033711 HC HIGH FLOW OXYGEN

## 2021-01-15 PROCEDURE — 82962 GLUCOSE BLOOD TEST: CPT

## 2021-01-15 PROCEDURE — 74011250637 HC RX REV CODE- 250/637: Performed by: INTERNAL MEDICINE

## 2021-01-15 PROCEDURE — 94640 AIRWAY INHALATION TREATMENT: CPT

## 2021-01-15 PROCEDURE — 74011636637 HC RX REV CODE- 636/637: Performed by: INTERNAL MEDICINE

## 2021-01-15 PROCEDURE — 94762 N-INVAS EAR/PLS OXIMTRY CONT: CPT

## 2021-01-15 PROCEDURE — 74011000258 HC RX REV CODE- 258: Performed by: INTERNAL MEDICINE

## 2021-01-15 RX ADMIN — Medication 1000 MG: at 16:35

## 2021-01-15 RX ADMIN — ENOXAPARIN SODIUM 30 MG: 30 INJECTION SUBCUTANEOUS at 22:05

## 2021-01-15 RX ADMIN — ALBUTEROL SULFATE 2 PUFF: 108 INHALANT RESPIRATORY (INHALATION) at 23:00

## 2021-01-15 RX ADMIN — DOXYCYCLINE HYCLATE 100 MG: 100 CAPSULE ORAL at 22:05

## 2021-01-15 RX ADMIN — NIFEDIPINE 20 MG: 10 CAPSULE ORAL at 13:38

## 2021-01-15 RX ADMIN — Medication 220 MG: at 09:00

## 2021-01-15 RX ADMIN — CEFTRIAXONE SODIUM 2 G: 2 INJECTION, POWDER, FOR SOLUTION INTRAMUSCULAR; INTRAVENOUS at 12:02

## 2021-01-15 RX ADMIN — NIFEDIPINE 20 MG: 10 CAPSULE ORAL at 03:42

## 2021-01-15 RX ADMIN — DOXYCYCLINE HYCLATE 100 MG: 100 CAPSULE ORAL at 09:00

## 2021-01-15 RX ADMIN — Medication 1000 MG: at 09:00

## 2021-01-15 RX ADMIN — DEXAMETHASONE SODIUM PHOSPHATE 6 MG: 10 INJECTION INTRAMUSCULAR; INTRAVENOUS at 22:05

## 2021-01-15 RX ADMIN — DEXAMETHASONE SODIUM PHOSPHATE 6 MG: 10 INJECTION INTRAMUSCULAR; INTRAVENOUS at 14:00

## 2021-01-15 RX ADMIN — ENOXAPARIN SODIUM 30 MG: 30 INJECTION SUBCUTANEOUS at 08:59

## 2021-01-15 RX ADMIN — DEXAMETHASONE SODIUM PHOSPHATE 6 MG: 10 INJECTION INTRAMUSCULAR; INTRAVENOUS at 06:00

## 2021-01-15 RX ADMIN — FUROSEMIDE 20 MG: 10 INJECTION, SOLUTION INTRAMUSCULAR; INTRAVENOUS at 09:00

## 2021-01-15 RX ADMIN — INSULIN LISPRO 2 UNITS: 100 INJECTION, SOLUTION INTRAVENOUS; SUBCUTANEOUS at 12:01

## 2021-01-15 RX ADMIN — Medication 1000 MG: at 22:04

## 2021-01-15 RX ADMIN — REMDESIVIR 100 MG: 100 INJECTION, POWDER, LYOPHILIZED, FOR SOLUTION INTRAVENOUS at 13:38

## 2021-01-15 RX ADMIN — FUROSEMIDE 20 MG: 10 INJECTION, SOLUTION INTRAMUSCULAR; INTRAVENOUS at 22:04

## 2021-01-15 RX ADMIN — ALBUTEROL SULFATE 2 PUFF: 108 INHALANT RESPIRATORY (INHALATION) at 10:45

## 2021-01-15 RX ADMIN — NIFEDIPINE 20 MG: 10 CAPSULE ORAL at 09:00

## 2021-01-15 NOTE — PROGRESS NOTES
Progress Note    Patient: Alejo Hernandez MRN: [id number]  SSN: [id number]    YOB: 1985  Age: 31 y.o. Sex: female      Admit Date: 1/10/2021    LOS: 5 days     Subjective:     Patient with past medical history of    COVID infection since 2021  Pre-eclampsia     Admitted to [facility] s/p  section on 2021     On room air until 1-. CXR shows bilateral infiltrates. Still on oxygen 60 LPM. Pulmonary is consulted. Patient is subjectively feeling better. She would like to challenge herself and have oxygen cut back more. Objective:     Vitals:    21 2147 21 2217 01/15/21 0340 01/15/21 0736   BP: 116/73  (!) 147/80 (!) 148/83   Pulse: (!) 110  (!) 102 (!) 109   Resp: 18  18 18   Temp: 97.8 °F (36.6 °C)  98 °F (36.7 °C) 98.1 °F (36.7 °C)   SpO2: 91% 92% 93% 92%        Intake and Output:  Current Shift: No intake/output data recorded. Last three shifts:  190 - 01/15 0700  In: 920 [P.O.:920]  Out: 2000 [Urine:2000]    Physical Exam:     General:                    The patient is a pleasant female in acute respiratory distress. On high flow oxygen. She is sitting up in her chair and talking well. Head:                                   Normocephalic/atraumatic. Eyes:                                   No palpebral pallor or scleral icterus. ENT:                                    External auricular and nasal exam within normal limits. Mucous membranes are moist.  Neck:                                   Supple, non-tender, no JVD. Lungs:                       diminished to auscultation bilaterally without wheezes or crackles. No respiratory distress or accessory muscle use. Heart:                                  Regular rate and rhythm, without murmurs, rubs, or gallops. Abdomen:                  Soft, non-tender, non-distended with normoactive bowel sounds. Genitourinary:           No tenderness over the bladder or bilateral CVAs. Extremities:               Without clubbing, cyanosis, or edema. Skin:                                    Normal color, texture, and turgor. No rashes, lesions, or jaundice. Pulses:                      Radial and dorsalis pedis pulses present 2+ bilaterally. Capillary refill <2s. Neurologic:                CN II-XII grossly intact and symmetrical.                                               Moving all four extremities well with no focal deficits. Psychiatric:                Pleasant demeanor, appropriate affect. Alert and oriented x 3      Lab/Data Review:    Recent Results (from the past 24 hour(s))   GLUCOSE, POC    Collection Time: 01/14/21 12:16 PM   Result Value Ref Range    Glucose (POC) 107 (H) 65 - 100 mg/dL   GLUCOSE, POC    Collection Time: 01/14/21  5:30 PM   Result Value Ref Range    Glucose (POC) 138 (H) 65 - 100 mg/dL   GLUCOSE, POC    Collection Time: 01/14/21  8:30 PM   Result Value Ref Range    Glucose (POC) 263 (H) 65 - 100 mg/dL   D DIMER    Collection Time: 01/15/21  4:04 AM   Result Value Ref Range    D DIMER 2.89 (H) <0.56 ug/ml(FEU)   CBC WITH AUTOMATED DIFF    Collection Time: 01/15/21  4:04 AM   Result Value Ref Range    WBC 14.3 (H) 4.3 - 11.1 K/uL    RBC 4.51 4.05 - 5.2 M/uL    HGB 11.2 (L) 11.7 - 15.4 g/dL    HCT 35.5 (L) 35.8 - 46.3 %    MCV 78.7 (L) 79.6 - 97.8 FL    MCH 24.8 (L) 26.1 - 32.9 PG    MCHC 31.5 31.4 - 35.0 g/dL    RDW 19.9 (H) 11.9 - 14.6 %    PLATELET 014 (H) 079 - 450 K/uL    MPV 9.6 9.4 - 12.3 FL    ABSOLUTE NRBC 0.03 0.0 - 0.2 K/uL    NEUTROPHILS 73 43 - 78 %    LYMPHOCYTES 20 13 - 44 %    MONOCYTES 4 4.0 - 12.0 %    EOSINOPHILS 0 (L) 0.5 - 7.8 %    BASOPHILS 0 0.0 - 2.0 %    IMMATURE GRANULOCYTES 3 0.0 - 5.0 %    ABS. NEUTROPHILS 10.4 (H) 1.7 - 8.2 K/UL    ABS. LYMPHOCYTES 2.9 0.5 - 4.6 K/UL    ABS. MONOCYTES 0.6 0.1 - 1.3 K/UL    ABS. EOSINOPHILS 0.0 0.0 - 0.8 K/UL    ABS. BASOPHILS 0.0 0.0 - 0.2 K/UL    ABS. IMM. GRANS. 0.4 0.0 - 0.5 K/UL    DF AUTOMATED     METABOLIC PANEL, COMPREHENSIVE    Collection Time: 01/15/21  4:04 AM   Result Value Ref Range    Sodium 145 136 - 145 mmol/L    Potassium 3.6 3.5 - 5.1 mmol/L    Chloride 111 (H) 98 - 107 mmol/L    CO2 25 21 - 32 mmol/L    Anion gap 9 7 - 16 mmol/L    Glucose 121 (H) 65 - 100 mg/dL    BUN 32 (H) 6 - 23 MG/DL    Creatinine 0.64 0.6 - 1.0 MG/DL    GFR est AA >60 >60 ml/min/1.73m2    GFR est non-AA >60 >60 ml/min/1.73m2    Calcium 8.8 8.3 - 10.4 MG/DL    Bilirubin, total 0.3 0.2 - 1.1 MG/DL    ALT (SGPT) 23 12 - 65 U/L    AST (SGOT) 31 15 - 37 U/L    Alk. phosphatase 121 50 - 136 U/L    Protein, total 6.3 6.3 - 8.2 g/dL    Albumin 2.1 (L) 3.5 - 5.0 g/dL    Globulin 4.2 (H) 2.3 - 3.5 g/dL    A-G Ratio 0.5 (L) 1.2 - 3.5     GLUCOSE, POC    Collection Time: 01/15/21  7:34 AM   Result Value Ref Range    Glucose (POC) 132 (H) 65 - 100 mg/dL     Results     Procedure Component Value Units Date/Time    CULTURE, BLOOD [[id number]] Collected: 01/09/21 0950    Order Status: Completed Specimen: Blood Updated: 01/14/21 0739     Special Requests: --        LEFT  HAND       Culture result: NO GROWTH 5 DAYS       CULTURE, BLOOD [[id number]] Collected: 01/09/21 0940    Order Status: Completed Specimen: Blood Updated: 01/14/21 0739     Special Requests: --        LEFT  ARM       Culture result: NO GROWTH 5 DAYS       INFLUENZA A & B AG (RAPID TEST) [[id number]] Collected: 01/07/21 0512    Order Status: Completed Specimen: Nasopharyngeal from Nasal washing Updated: 01/07/21 0530     Influenza A Ag Negative        Comment: NEGATIVE FOR THE PRESENCE OF INFLUENZA A ANTIGEN  INFECTION DUE TO INFLUENZA A CANNOT BE RULED OUT. BECAUSE THE ANTIGEN PRESENT IN THE SAMPLE MAY BE BELOW  THE DETECTION LIMIT OF THE TEST.   A NEGATIVE TEST IS PRESUMPTIVE AND IT IS RECOMMENDED THAT THESE RESULTS BE CONFIRMED BY VIRAL CULTURE OR AN FDA-CLEARED INFLUENZA A AND B MOLECULAR ASSAY.          Influenza B Ag Negative        Comment: NEGATIVE FOR THE PRESENCE OF INFLUENZA B ANTIGEN  INFECTION DUE TO INFLUENZA B CANNOT BE RULED OUT.  BECAUSE THE ANTIGEN PRESENT IN THE SAMPLE MAY BE BELOW  THE DETECTION LIMIT OF THE TEST.  A NEGATIVE TEST IS PRESUMPTIVE AND IT IS RECOMMENDED THAT THESE RESULTS BE CONFIRMED BY VIRAL CULTURE OR AN FDA-CLEARED INFLUENZA A AND B MOLECULAR ASSAY.          Source Nasopharyngeal       CULTURE, URINE [[id number]] Collected: 01/07/21 0512    Order Status: Completed Specimen: Cath Urine Updated: 01/09/21 0724     Special Requests: NO SPECIAL REQUESTS        Culture result: NO GROWTH 2 DAYS       CULTURE, GENITAL GROUP B STREP [[id number]]  (Abnormal) Collected: 01/07/21 0447    Order Status: Completed Specimen: VAGINAL/RECTAL Updated: 01/11/21 0952     Special Requests: NO SPECIAL REQUESTS        Culture result:       STREPTOCOCCI, BETA HEMOLYTIC GROUP B                  GROUP B STREPTOCOCCI REMAIN UNIVERSALLY SUSCEPTIBLE TO PENICILLIN, AMPICILLIN, AND CEFAZOLIN. RESISTANCE TO CLINDAMYCIN AND ERYTHROMYCIN CAN OCCUR. PLEASE CONTACT LABORATORY WITHIN 48 HOURS IF ERYTHROMYCIN AND CLINDAMYCIN SUSCEPTIBILITY TESTING IS NEEDED.          GRP B STREP SCRN BY PCR [[id number]]  (Abnormal) Collected: 01/07/21 0447    Order Status: Completed Specimen: VAGINAL/RECTAL Updated: 01/07/21 0644     Special Requests: NO SPECIAL REQUESTS        Culture result:       POSITIVE: GBS target nucleic acid is detected. Presumptive for GBS colonization.                  RESULTS VERIFIED, PHONED TO AND READ BACK BY  WES AMADO 4OB AT 0532 ON 1/7/21 HA      CULTURE, URINE [[id number]] Collected: 01/07/21 0344    Order Status: Canceled Specimen: Urine from Clean catch         CT chest   1-9-2021  Impression:   1. No evidence of PE.  2. Bilateral infiltrates in keeping with Covid-19, similar to recent  radiography.  3. Trace pericardial effusion and trace left  pleural effusion. XR chest  1-  Impression:       1. Slightly improving aeration with decreasing consolidative changes along the  peripheral margin of left lung.     TTE   1-    SUMMARY:     -  Left ventricle: Systolic function was normal. Ejection fraction was  estimated in the range of 60 % to 65 %.  There were no regional wall motion  abnormalities.         Current Facility-Administered Medications:     docusate sodium (COLACE) capsule 100 mg, 100 mg, Oral, DAILY PRN, González Cleary MD    polyethylene glycol (MIRALAX) packet 17 g, 17 g, Oral, DAILY PRN, González Cleary MD    simethicone (MYLICON) tablet 80 mg, 80 mg, Oral, QID PRN, González Cleary MD    doxycycline (VIBRAMYCIN) capsule 100 mg, 100 mg, Oral, Q12H, González Cleary MD, 100 mg at 01/15/21 0900    albuterol (PROVENTIL HFA, VENTOLIN HFA, PROAIR HFA) inhaler 4 Puff, 4 Puff, Inhalation, Q6H PRN, González Cleary MD    albuterol (PROVENTIL HFA, VENTOLIN HFA, PROAIR HFA) inhaler 2 Puff, 2 Puff, Inhalation, TID RT, González Cleary MD, 2 Puff at 01/14/21 2217    dexamethasone (DECADRON) 10 mg/mL injection 6 mg, 6 mg, IntraVENous, Q8H, Judy Jose, DO, 6 mg at 01/15/21 0600    insulin lispro (HUMALOG) injection 0-10 Units, 0-10 Units, SubCUTAneous, AC&HS, Judy Jose, DO, Stopped at 01/15/21 0730    [COMPLETED] remdesivir 200 mg in 0.9% sodium chloride 250 mL IVPB, 200 mg, IntraVENous, ONCE, 200 mg at 01/12/21 1132 **FOLLOWED BY** remdesivir 100 mg in 0.9% sodium chloride 250 mL IVPB, 100 mg, IntraVENous, Q24H, Judy Jose, DO, 100 mg at 01/14/21 1732    enoxaparin (LOVENOX) injection 30 mg, 30 mg, SubCUTAneous, Q12H, Judy Jose DO, 30 mg at 01/15/21 0859    0.9% sodium chloride infusion 50 mL remdesivir flush, 50 mL, IntraVENous, Q24H, Judy Jose DO, 50 mL at 01/14/21 1305    dextrose 40% (GLUTOSE) oral gel 1 Tube, 15 g, Oral, PRN, Judy Jose,     glucagon (GLUCAGEN) injection 1 mg, 1 mg, IntraMUSCular, PRN, Judy Jose, DO    dextrose (D50W) injection syrg 12.5-25 g, 25-50 mL, IntraVENous, PRN, Judy Jose, DO    furosemide (LASIX) injection 20 mg, 20 mg, IntraVENous, Q12H, Gretel Hester MD, 20 mg at 01/15/21 0900    diphenhydrAMINE (BENADRYL) injection 12.5 mg, 12.5 mg, IntraVENous, Q6H PRN, Gurmeet Mckee MD    promethazine (PHENERGAN) with saline injection 6.25 mg, 6.25 mg, IntraVENous, Q6H PRN, Gurmeet Mckee MD    0.9% sodium chloride infusion 250 mL, 250 mL, IntraVENous, PRN, Gurmeet Mckee MD    ascorbic acid (vitamin C) (VITAMIN C) tablet 1,000 mg, 1,000 mg, Oral, TID, Brooke Mckee MD, 1,000 mg at 01/15/21 0900    cefTRIAXone (ROCEPHIN) 2 g in 0.9% sodium chloride (MBP/ADV) 50 mL MBP, 2 g, IntraVENous, Q24H, Brooke Mckee MD, Last Rate: 100 mL/hr at 21 1305, 2 g at 21 1305    ibuprofen (MOTRIN) tablet 800 mg, 800 mg, Oral, Q6H PRN, Brooke Mckee MD, 800 mg at 21 1930    NIFEdipine (PROCARDIA) capsule 20 mg, 20 mg, Oral, Q6H, Brooke Mckee MD, 20 mg at 01/15/21 0900    zinc sulfate tablet 220 mg, 220 mg, Oral, DAILY, Brooke Mckee MD, 220 mg at 01/15/21 0900    metoprolol (LOPRESSOR) injection 5 mg, 5 mg, IntraVENous, Q6H PRN, Roula Edwards MD, 5 mg at 21    Assessment:     Principal Problem:    COVID-19 affecting pregnancy in third trimester (2021)      Overview: Sx 21      Positive 21      Precautions through 21    Active Problems:    Tachycardia (2021)      H/O  section (2021)      SOB (shortness of breath) (2021)      Pneumonia due to COVID-19 virus (2021)      Acute respiratory failure with hypoxia ([address] (2021)        Plan:     Acute respiratory failure with hypoxia   COVID pneumonia   S/P convalescent plasma, Decadron  Continue oxygen supplementation and wean as tolerated.     Empiric IV antibiotics, Ceftriaxone and Doxycycline. On Remdesivir. Still on 60 LPM oxygen. Wean as tolerated. Pre-eclampsia   Continue Procardia. Monitor BP. BP is controlled now. Post partum   S/P  section     I have discussed the plan of care with patient.       DVT prophylaxis : SCD     Signed By: Lyudmila Rdz MD     January 15, 2021

## 2021-01-15 NOTE — PROGRESS NOTES
Work with the staff and the  for facetimei between the parents and the baby at two different hospitals   was a very positive experience for everyone involved   I also will continue to provide that daily support in this unusual circumstance     did not visit this covid patient  Waited in [place] with PPE on

## 2021-01-15 NOTE — ADT AUTH CERT NOTES
Pneumonia - Care Day 4 (1/14/2021) by Donaldson Peña       Review Status Review Entered   Completed 1/15/2021 11:25      Criteria Review      Care Day: 4 Care Date: 1/14/2021 Level of Care: Inpatient Floor    Guideline Day 3    Level Of Care    (X) Floor to discharge [F]    1/15/2021 11:25:12 EST by Donaldson Peña      medical    Clinical Status    ( ) * Hemodynamic stability    1/15/2021 11:25:12 EST by Donaldson Peña      , 113, 117    (X) * Afebrile or temperature acceptable for next level of care    1/15/2021 11:25:12 EST by Jakob Mcclainron 98.0    (X) * Tachypnea absent    1/15/2021 11:25:12 EST by Abloomy      RR 18    ( ) * Hypoxemia absent    1/15/2021 11:25:12 EST by Donaldson Peña      91% on HFNC 60lpm    (X) * Mental status at baseline    1/15/2021 11:25:12 EST by Abloomy      Psychiatric:                Pleasant demeanor, appropriate affect.  Alert and oriented x 3    (X) * Antibiotic regimen acceptable for next level of care    1/15/2021 11:25:12 EST by Donaldson Peña      Empiric IV antibiotics    ( ) * Discharge plans and education understood    Activity    ( ) * Ambulatory or acceptable for next level of care    1/15/2021 11:25:12 EST by Abloomy      bedrest    Routes    (X) * Oral hydration, medications, and diet    1/15/2021 11:25:12 EST by Donaldson Peña      regular diet  Vitamin C 1,000mg PO TID  Ibuprofen 800mg PO q6 PRN x1  Nifedipine 20mg PO q6  Zinc sulfate 220mg PO daily  Colace 100mg PO BID  Miralax 17g PO daily  Simethicone 80mg PO QID before meals and nightly    Interventions    ( ) * Oxygen absent or at baseline need    (X) * Isolation not indicated, or is performable at next level of care    1/15/2021 11:25:12 EST by Donaldson Peña      droplet plus isolation    (X) WBC    1/15/2021 11:25:12 EST by Donaldson Peña      WBC: 16.6 (H)    (X) Incentive spirometry    1/15/2021 11:25:12 EST by Sloane Platt Brit      incentive spirometry q1    Medications    (X) Oral antibiotics    1/15/2021 11:25:12 EST by Prakash Scruggs      Doxycycline 100mg PO q12    * Milestone   Additional Notes   Date of care: 2021 Care day 4      IP - LOC- Medical      IM PN: Subjective:   Patient with past medical history of     COVID infection since 2021   Pre-eclampsia    Admitted to [facility] s/p  section on 2021    On room air until 1-. CXR shows bilateral infiltrates.     Still on oxygen 60 LPM. Pulmonary is consulted. Patient is subjectively feeling better. General:                    The patient is a pleasant female in acute respiratory distress. On high flow oxygen    Head:                                   Normocephalic/atraumatic. Eyes:                                   No palpebral pallor or scleral icterus. ENT:                                    External auricular and nasal exam within normal limits.                                             Mucous membranes are moist.   Neck:                                   Supple, non-tender, no JVD. Lungs:                       diminished to auscultation bilaterally without wheezes or crackles.                                             No respiratory distress or accessory muscle use. Heart:                                  Regular rate and rhythm, without murmurs, rubs, or gallops. Abdomen:                  Soft, non-tender, non-distended with normoactive bowel sounds. Genitourinary:           No tenderness over the bladder or bilateral CVAs. Extremities:               Without clubbing, cyanosis, or edema. Skin:                                    Normal color, texture, and turgor. No rashes, lesions, or jaundice. Pulses:                      Radial and dorsalis pedis pulses present 2+ bilaterally.                                                Capillary refill <2s.     Neurologic:                CN II-XII grossly intact and symmetrical.                                                Moving all four extremities well with no focal deficits. Psychiatric:                Pleasant demeanor, appropriate affect. Alert and oriented x 3   Plan:   Acute respiratory failure with hypoxia    COVID pneumonia    S/P convalescent plasma, Decadron   Continue oxygen supplementation and wean as tolerated.     Empiric IV antibiotics. Patient declines Remdesivir. Still on 60 LPM oxygen. Wean as tolerated.        Pre-eclampsia    Continue Procardia. Monitor BP. BP is controlled now.        Post partum    S/P  section      Vitals: Temp 98.0, , 113, 117, /90, RR 18, 91% on HFNC 60lpm      Labs:   2021 03:26   WBC: 16.6 (H)   RBC: 4.64   HGB: 11.4 (L)   HCT: 36.3   MCV: 78.2 (L)   MCH: 24.6 (L)   MCHC: 31.4   RDW: 20.4 (H)   PLATELET: 908 (H)   NEUTROPHILS: 74   MONOCYTES: 5   EOSINOPHILS: 0 (L)   ABS. NEUTROPHILS: 12.3 (H)   ABS. IMM. GRANS.: 0.5   ABS.  MONOCYTES: 0.8   D DIMER: 2.68 (H)   Sodium: 141   Potassium: 3.5   Chloride: 108 (H)   CO2: 25   Anion gap: 8   Glucose: 152 (H)   BUN: 28 (H)   Creatinine: 0.65   Calcium: 9.0   Protein, total: 7.0   Albumin: 2.3 (L)   Globulin: 4.7 (H)   A-G Ratio: 0.5 (L)   AST: 47 (H)      Medications:    NS 50mL IV q24   Albuterol inhaler 2 puffs TID   Ceftriaxone 2g IV q24   Dexamethasone 6mg IV q8   Lovenox 30mg SC q12   Lasix 20mg IV q12   Insulin lispro SC QID ACHS SSI   Remdesivir 100mg IV q24   Doxycycline 100mg IV q12      Plan:   daily d dimer, regular diet, SCDs, bedrest, droplet plus isolation, elevate HOB 30 degrees, incentive spirometry q1, I&Os qshift, spot check oximetry, oxygen high flow                                     Pneumonia - Care Day 3 (2021) by Aster Ansari       Review Status Review Entered   Completed 1/15/2021 11:10      Criteria Review      Care Day: 3 Care Date: 2021 Level of Care: Inpatient Floor    Guideline Day 3    Level Of Care    (X) Floor to discharge [F]    1/15/2021 11:10:16 EST by Sofia Carballo      medical    Clinical Status    ( ) * Hemodynamic stability    1/15/2021 11:10:16 EST by Sofia Carballo      , 117, 111    (X) * Afebrile or temperature acceptable for next level of care    1/15/2021 11:10:16 EST by Sofia Carballo      Temp 98.4    ( ) * Tachypnea absent    1/15/2021 11:10:16 EST by Sofia Carballo      RR 22, 20, 20    ( ) * Hypoxemia absent    1/15/2021 11:10:16 EST by Sofia Carballo      93% on HFNC 60lpm    (X) * Mental status at baseline    1/15/2021 11:10:16 EST by Sofia Carballo      Psychiatric:                Pleasant demeanor, appropriate affect.  Alert and oriented x 3    (X) * Antibiotic regimen acceptable for next level of care    1/15/2021 11:10:16 EST by Sofia Carballo      Empiric IV antibiotics    ( ) * Discharge plans and education understood    Activity    ( ) * Ambulatory or acceptable for next level of care    1/15/2021 11:10:16 EST by Sofia Carballo      bedrest    Routes    (X) * Oral hydration, medications, and diet    1/15/2021 11:10:16 EST by Sofia Carballo      regular diet  Vitamin C 1,000mg PO TID  Ibuprofen 800mg PO q6 PRN x1  Nifedipine 20mg PO q6  Zinc sulfate 220mg PO daily  Colace 100mg PO BID  Miralax 17g PO daily  Simethicone 80mg PO QID before meals and nightly    Interventions    ( ) * Oxygen absent or at baseline need    (X) * Isolation not indicated, or is performable at next level of care    1/15/2021 11:10:16 EST by Sofia Carballo      droplet plus isolation    (X) WBC    1/15/2021 11:10:16 EST by Sofia Carballo      WBC: 14.2 (H)    (X) Incentive spirometry    1/15/2021 11:10:16 EST by Sofia Carballo      incentive spirometry    * Milestone   Additional Notes   Date of care: 1/13/2021 Care day 3      IP - LOC- Medical      IM PN: Subjective:   Patient with past medical history of     COVID infection since 1-7-2021   Pre-eclampsia Admitted to [facility] s/p  section on 2021    On room air until 1-. CXR shows bilateral infiltrates. Now on oxygen 60 LPM. Pulmonary is consulted   General:                    The patient is a pleasant female in acute respiratory distress. On high flow oxygen    Head:                                   Normocephalic/atraumatic. Eyes:                                   No palpebral pallor or scleral icterus. ENT:                                    External auricular and nasal exam within normal limits.                                             Mucous membranes are moist.   Neck:                                   Supple, non-tender, no JVD. Lungs:                       diminished to auscultation bilaterally without wheezes or crackles.                                             No respiratory distress or accessory muscle use. Heart:                                  Regular rate and rhythm, without murmurs, rubs, or gallops. Abdomen:                  Soft, non-tender, non-distended with normoactive bowel sounds. Genitourinary:           No tenderness over the bladder or bilateral CVAs. Extremities:               Without clubbing, cyanosis, or edema. Skin:                                    Normal color, texture, and turgor. No rashes, lesions, or jaundice. Pulses:                      Radial and dorsalis pedis pulses present 2+ bilaterally.                                                Capillary refill <2s. Neurologic:                CN II-XII grossly intact and symmetrical.                                                Moving all four extremities well with no focal deficits. Psychiatric:                Pleasant demeanor, appropriate affect. Alert and oriented x 3   Plan:   Acute respiratory failure with hypoxia    COVID pneumonia    S/P convalescent plasma, Decadron   Continue oxygen supplementation and wean as tolerated.     Empiric IV antibiotics.     Patient declines Remdesivir.        Pre-eclampsia    Continue Procardia. Monitor BP        Post partum    S/P  section      PD PN:  - feeling better today.  Has taken NRB mask off, remains with Airvo, O2 sat remains 100%, so weaned airvo to 60L. 90% and O2 sat remains %. 31 y.o. y/o female with acute hypoxemic respiratory failure secondary to COVID-19. PLAN:   1.  Acute respiratory failure with hypoxia: supportive measures with noninvasive oxygen strategy unless tiring or desaturation. Wean as tolerated.  Weaned from 60L/100% to 60L/90% and tolerated.  NRB mask is not on, but within reach if needed.  Responding to lasix so plan to continue for now. With recent Pre-E some of her infiltrates could be pulmonary edema.                >>CRP 15.9 and pro- ()>>likley related to inflammatory process, cont            steroids                >> Dimer was 5.34 () and PCT was 1.37 () so agree with steroids and    anticoagulation doses.              >> She is on continuous pulse oximetry and given alertness and apparent stability I think  this is reasonable for now. Obviously, in ideal times I would move her to the ICU, but with       surge COVID numbers there is no availability at this point. 2. COVID-19 pneumonia:  Decadron, Convalescent plasma, remdesivir as outlined above. 3. Nutrition: eat as tolerated   4. PPx:  DVT ppx ongoing.  H2 blocker with decadron      Vitals: Temp 98.4, , 117, 111, /93, RR 22, 20, 20, 93% on HFNC 60lpm      Labs:   2021 05:34   WBC: 14.2 (H)   RBC: 4.44   HGB: 11.1 (L)   HCT: 34.6 (L)   MCV: 77.9 (L)   MCH: 25.0 (L)   MCHC: 32.1   RDW: 20.1 (H)   PLATELET: 482   NEUTROPHILS: 77   MONOCYTES: 3 (L)   EOSINOPHILS: 0 (L)   ABS. NEUTROPHILS: 10.9 (H)   ABS. IMM. GRANS.: 0.4   ABS.  MONOCYTES: 0.4   D DIMER: 5.34 (H)   Sodium: 141   Potassium: 3.6   Chloride: 107   CO2: 25   Anion gap: 9   Glucose: 165 (H)   BUN: 19   Creatinine: 0.59 (L) Calcium: 9.2   Protein, total: 6.9   Albumin: 2.1 (L)   Globulin: 4.8 (H)   A-G Ratio: 0.4 (L)   AST: 54 (H)      Medications:    Albuterol inhaler 2 puff TID   Ceftriaxone 2g IV q24   Dexamethasone 6mg IV q8   Lovenox 30mg SC q12   Lasix 20mg IV q12   Insulin lispro SC QID ACHS SSI   Remdesivir 100mg IV q24   Albuterol inhaler 4 puffs q6   Doxycycline 100mg IV q12      Plan:   daily d dimer, regular diet, SCDs, bedrest, droplet plus isolation, elevate HOB 30 degrees, incentive spirometry, I&Os q8, spot check oximetry, oxygen high flow

## 2021-01-16 VITALS
SYSTOLIC BLOOD PRESSURE: 124 MMHG | OXYGEN SATURATION: 94 % | HEART RATE: 90 BPM | TEMPERATURE: 98.1 F | DIASTOLIC BLOOD PRESSURE: 89 MMHG | RESPIRATION RATE: 20 BRPM

## 2021-01-16 LAB
D DIMER PPP FEU-MCNC: 3.15 UG/ML(FEU)
GLUCOSE BLD STRIP.AUTO-MCNC: 118 MG/DL (ref 65–100)
GLUCOSE BLD STRIP.AUTO-MCNC: 134 MG/DL (ref 65–100)
GLUCOSE BLD STRIP.AUTO-MCNC: 181 MG/DL (ref 65–100)

## 2021-01-16 PROCEDURE — 74011000250 HC RX REV CODE- 250: Performed by: INTERNAL MEDICINE

## 2021-01-16 PROCEDURE — 85379 FIBRIN DEGRADATION QUANT: CPT

## 2021-01-16 PROCEDURE — 74011636637 HC RX REV CODE- 636/637: Performed by: INTERNAL MEDICINE

## 2021-01-16 PROCEDURE — 94760 N-INVAS EAR/PLS OXIMETRY 1: CPT

## 2021-01-16 PROCEDURE — 74011250637 HC RX REV CODE- 250/637: Performed by: INTERNAL MEDICINE

## 2021-01-16 PROCEDURE — 74011000258 HC RX REV CODE- 258: Performed by: INTERNAL MEDICINE

## 2021-01-16 PROCEDURE — 94640 AIRWAY INHALATION TREATMENT: CPT

## 2021-01-16 PROCEDURE — 36415 COLL VENOUS BLD VENIPUNCTURE: CPT

## 2021-01-16 PROCEDURE — 74011250636 HC RX REV CODE- 250/636: Performed by: INTERNAL MEDICINE

## 2021-01-16 PROCEDURE — 82962 GLUCOSE BLOOD TEST: CPT

## 2021-01-16 PROCEDURE — 77010033678 HC OXYGEN DAILY

## 2021-01-16 RX ORDER — UREA 10 %
220 LOTION (ML) TOPICAL DAILY
Qty: 5 TAB | Refills: 0 | Status: SHIPPED | OUTPATIENT
Start: 2021-01-17 | End: 2021-01-22

## 2021-01-16 RX ORDER — VITAMIN E 1000 UNIT
1000 CAPSULE ORAL 3 TIMES DAILY
Qty: 15 TAB | Refills: 0 | Status: SHIPPED | OUTPATIENT
Start: 2021-01-16 | End: 2021-01-21

## 2021-01-16 RX ORDER — NIFEDIPINE 20 MG/1
20 CAPSULE ORAL EVERY 6 HOURS
Qty: 60 CAP | Refills: 0 | Status: SHIPPED | OUTPATIENT
Start: 2021-01-16 | End: 2021-01-31

## 2021-01-16 RX ORDER — DEXAMETHASONE 6 MG/1
6 TABLET ORAL
Qty: 5 TAB | Refills: 0 | Status: SHIPPED | OUTPATIENT
Start: 2021-01-16 | End: 2021-01-21

## 2021-01-16 RX ADMIN — DEXAMETHASONE SODIUM PHOSPHATE 6 MG: 10 INJECTION INTRAMUSCULAR; INTRAVENOUS at 05:08

## 2021-01-16 RX ADMIN — Medication 1000 MG: at 08:48

## 2021-01-16 RX ADMIN — DEXAMETHASONE SODIUM PHOSPHATE 6 MG: 10 INJECTION INTRAMUSCULAR; INTRAVENOUS at 13:03

## 2021-01-16 RX ADMIN — ALBUTEROL SULFATE 2 PUFF: 108 INHALANT RESPIRATORY (INHALATION) at 13:23

## 2021-01-16 RX ADMIN — INSULIN LISPRO 2 UNITS: 100 INJECTION, SOLUTION INTRAVENOUS; SUBCUTANEOUS at 01:25

## 2021-01-16 RX ADMIN — Medication 1000 MG: at 15:59

## 2021-01-16 RX ADMIN — ENOXAPARIN SODIUM 30 MG: 30 INJECTION SUBCUTANEOUS at 08:47

## 2021-01-16 RX ADMIN — Medication 220 MG: at 08:48

## 2021-01-16 RX ADMIN — FUROSEMIDE 20 MG: 10 INJECTION, SOLUTION INTRAMUSCULAR; INTRAVENOUS at 08:48

## 2021-01-16 RX ADMIN — ALBUTEROL SULFATE 2 PUFF: 108 INHALANT RESPIRATORY (INHALATION) at 08:57

## 2021-01-16 RX ADMIN — REMDESIVIR 100 MG: 100 INJECTION, POWDER, LYOPHILIZED, FOR SOLUTION INTRAVENOUS at 11:59

## 2021-01-16 RX ADMIN — DOXYCYCLINE HYCLATE 100 MG: 100 CAPSULE ORAL at 08:47

## 2021-01-16 RX ADMIN — SODIUM CHLORIDE 50 ML: 900 INJECTION, SOLUTION INTRAVENOUS at 11:59

## 2021-01-16 NOTE — PROGRESS NOTES
Pt is alert and oriented at time of discharge. AVS reviewed with pt, pt voiced understanding. Opportunity given to voice questions/concerns. Paper prescriptions given to pt. IV removed with no complications, VSS at this time. Pt to call when her ride is here.

## 2021-01-16 NOTE — DISCHARGE SUMMARY
Discharge Summary     Patient: Howard Desai MRN: [id number]  SSN: [id number]    YOB: 1985  Age: 31 y.o. Sex: female       Admit Date: 1/10/2021    Discharge Date: 2021      Admission Diagnoses: Pneumonia due to COVID-19 virus [U07.1, J12.82]    Discharge Diagnoses:   Problem List as of 2021 Date Reviewed: 2021          Codes Class Noted - Resolved     labor in third trimester without delivery ICD-10-CM: O60.03  ICD-9-CM: 644.03  2021 - Present        Acute respiratory failure with hypoxia [facility]) ICD-10-CM: J96.01  ICD-9-CM: 518.81  2021 - Present        Tachycardia ICD-10-CM: R00.0  ICD-9-CM: 785.0  2021 - Present        H/O  section ICD-10-CM: Z98.891  ICD-9-CM: V45.89  2021 - Present        SOB (shortness of breath) ICD-10-CM: R06.02  ICD-9-CM: 786.05  2021 - Present        Pneumonia due to COVID-19 virus ICD-10-CM: U07.1, J12.82  ICD-9-CM: 480.8  2021 - Present        Pre-eclampsia superimposed on chronic hypertension ICD-10-CM: O11.9  ICD-9-CM: 642.70, 642.00  2021 - Present        * (Principal) COVID-19 affecting pregnancy in third trimester ICD-10-CM: O98.513, U07.1  ICD-9-CM: 647.63, 079.89  2021 - Present    Overview Signed 2021 10:02 AM by MD Vicky Mckinnon 21  Positive 21  Precautions through 21             Anemia during pregnancy in third trimester ICD-10-CM: O99.013  ICD-9-CM: 648.23  2020 - Present    Overview Signed 2020  9:57 AM by Aminata Monahan RN     2020 at [facility]: hgb 10.8.   · Integra samples given and prescription sent to pharmacy             Placental insufficiency affecting management of mother in third trimester ICD-10-CM: O36.5130  ICD-9-CM: 656.53  2020 - Present    Overview Signed 2020  3:44 PM by Aminata Monahan RN     SEE HRP OVERVIEW             Chronic hypertension with exacerbation during pregnancy in third trimester ICD-10-CM: Z72.967  ICD-9-CM: 642.03  10/19/2020 - Present    Overview Addendum 2021 10:03 AM by Ariana Castañeda MD     ... · 20- normal Pre-E labs P/C ratio-121  · 20 24 hour urine-389  . .. 2021 at [facility]: BP stable today. Denies PreE symptoms. AEDF on right. Pregnancy affected by fetal growth restriction ICD-10-CM: O36.5990  ICD-9-CM: 656.50  2020 - Present    Overview Addendum 2021 10:03 AM by Ariana Castañeda MD     2020 [facility]: Severe symmetric. Normal ANIL for gestation. Not secondary to dating error. Likely due to placenta overlying numerous fibroids. Cautious prognosis. 10/1/2020 [facility]: Appropriate interval growth, still growth restricted. Normal ANIL  2020 [facility]: Adequate interval growth; stable umbilical artery Dopplers. EFW 14%             Leiomyoma of uterus affecting pregnancy in third trimester ICD-10-CM: O34.13, D25.9  ICD-9-CM: 654.13, 218.9  2020 - Present    Overview Addendum 2020 11:53 AM by Ariana Castañeda MD     7 Fibroids seen at [facility]. Ranging from 2-6 cm. See High Risk Pregnancy Overview             High-risk pregnancy in third trimester ICD-10-CM: O09.93  ICD-9-CM: V23.9  2020 - Present    Overview Addendum 2021  9:51 AM by Camryn Bello MD     GTT-  Flu-  Tdap-  . ..    2020 at [facility]: BPP 8/8, ANIL 20cm, Stable elevated dopplers. Home BP log WNL with couple elevations noted 140/90 x2. Most /80s. Will have NST at hosp on 2020 at [facility]: Stable FGR, AC 5%, Overall 14%, ANIL 19cm, stable elevated dopplers. PreE labs  stable. 2021 at [facility]: BPP 8/8 ANIL 13.4. Elevated dopplers-stable. Reasuring fetal status, no signs of PreE. · Follow up MFM here twice weekly testing-here 21  · Growth every 2 weeks here 21  · Continue to work from home, modified rest and increased calories.                Prior  loss, antepartum ICD-10-CM: O09.299  ICD-9-CM: V23.49  2020 - Present Overview Addendum 2020  8:43 AM by Rene Landrum MD     20-patient delivered at home at 16 weeks gestation. 2020 at [facility]: 2020, spotting then cramping followed by significant pain and delivery into toilet. Unclear whether PTL versus CI. Patient would like to start 17P; CC message sent to [facility], RN 2020  Cervical evaluation with MFM at [facility] as necessary. See High Risk Pregnancy Overview             Rh negative status during pregnancy ICD-10-CM: O26.899, Z67.91  ICD-9-CM: 646.83  2020 - Present    Overview Addendum 12/10/2020 10:57 AM by Titus Lassiter drawn on 2020-negative    Rhogam given on 2020               GBS bacteriuria ICD-10-CM: R82.71  ICD-9-CM: 599.0, 041.02  2020 - Present        RESOLVED: Nuchal translucency of fetus on prenatal ultrasound ICD-10-CM: Z36.82  ICD-9-CM: 796.5  2020 - 2020    Overview Signed 2020  8:04 AM by Pancho Lanier, RN     See High Risk Pregnancy Overview             RESOLVED: Postpartum care and examination ICD-10-CM: Z39.2  ICD-9-CM: V24.2  3/10/2020 - 2020        RESOLVED: Screening examination for venereal disease ICD-10-CM: Z11.3  ICD-9-CM: V74.5  3/10/2020 - 2020               Discharge Condition: Stable    Hospital Course:     Patient with past medical history of    COVID infection since 2021  Pre-eclampsia      Admitted to [facility] service s/p  section on 2021      On room air until 1-.      CXR shows bilateral infiltrates. she was hypoxic and requires high flow oxygen many days up to 60 LPM. Pulmonology service was consulted. Patient was treated with convalescent plasma, Decadron   Empiric IV antibiotics, Ceftriaxone and Doxycycline and Remdesivir.      Feeling much better and able to be taken off oxygen by the time of discharge.      Physical Exam:      General:                    The patient is a pleasant female in mild acute respiratory distress. She is sitting up in her chair and talking well. GLMD:                                   [id number]. Eyes:                                   No palpebral pallor or scleral icterus. ENT:                                    External auricular and nasal exam within normal limits.                                             EAPSWG membranes are moist.  Neck:                                   Supple, non-tender, no JVD. Lungs:                       clear to auscultation bilaterally without wheezes or crackles.                                             No respiratory distress or accessory muscle use. Heart:                                  Regular rate and rhythm, without murmurs, rubs, or gallops. Abdomen:                  Soft, non-tender, non-distended with normoactive bowel sounds. Genitourinary:           No tenderness over the bladder or bilateral CVAs. Extremities:               Without clubbing, cyanosis, or edema. Skin:                                    Normal color, texture, and turgor. No rashes, lesions, or jaundice. Pulses:                      Radial and dorsalis pedis pulses present 2+ bilaterally.                                               Capillary refill <2s. Neurologic:                CN II-XII grossly intact and symmetrical.                                               Moving all four extremities well with no focal deficits. Psychiatric:                Pleasant demeanor, appropriate affect. Alert and oriented x 3    Consults: Pulmonary/Critical Care, OB    Significant Diagnostic Studies:     CT chest   1-9-2021  Impression:   1. No evidence of PE.  2. Bilateral infiltrates in keeping with Covid-19, similar to recent  radiography. 3. Trace pericardial effusion and trace left pleural effusion.     XR chest  1-  Impression:       1.  Slightly improving aeration with decreasing consolidative changes along the  peripheral margin of left lung.     TTE 1-     SUMMARY:     -  Left ventricle: Systolic function was normal. Ejection fraction was  estimated in the range of 60 % to 65 %. There were no regional wall motion  Abnormalities. Recent Results (from the past 24 hour(s))   GLUCOSE, POC    Collection Time: 01/16/21  1:24 AM   Result Value Ref Range    Glucose (POC) 181 (H) 65 - 100 mg/dL   GLUCOSE, POC    Collection Time: 01/16/21  7:26 AM   Result Value Ref Range    Glucose (POC) 134 (H) 65 - 100 mg/dL   D DIMER    Collection Time: 01/16/21 11:15 AM   Result Value Ref Range    D DIMER 3.15 (H) <0.56 ug/ml(FEU)   GLUCOSE, POC    Collection Time: 01/16/21 11:57 AM   Result Value Ref Range    Glucose (POC) 118 (H) 65 - 100 mg/dL     METABOLIC PANEL, COMPREHENSIVE [[id number]] (Order [id number])  Lab  Date: 1/14/2021 Department: [facility]  Med Surg Released By/Authorizing: Katherine Ramírez DO (auto-released)   Component Value Flag Ref Range Units Status   Sodium 145   136 - 145 mmol/L Final   Potassium 3.6   3.5 - 5.1 mmol/L Final   Chloride 111  High   98 - 107 mmol/L Final   CO2 25   21 - 32 mmol/L Final   Anion gap 9   7 - 16 mmol/L Final   Glucose 121  High   65 - 100 mg/dL Final   Comment:   47 - 60 mg/dl Consistent with, but not fully diagnostic of hypoglycemia. 101 - 125 mg/dl Impaired fasting glucose/consistent with pre-diabetes mellitus   > 126 mg/dl Fasting glucose consistent with overt diabetes mellitus    BUN 32  High   6 - 23 MG/DL Final   Creatinine 0.64   0.6 - 1.0 MG/DL Final   GFR est AA >60   >60 ml/min/1.73m2 Final   GFR est non-AA >60   >60 ml/min/1.73m2 Final   Comment:   (NOTE)   Estimated GFR is calculated using the Modification of Diet in Renal   Disease (MDRD) Study equation, reported for both  Americans   (GFRAA) and non- Americans (GFRNA), and normalized to 1.73m2   body surface area. The physician must decide which value applies to   the patient.  The MDRD study equation should only be used in   individuals age 25 or older. It has not been validated for the   following: pregnant women, patients with serious comorbid conditions,   or on certain medications, or persons with extremes of body size,   muscle mass, or nutritional status. Calcium 8.8   8.3 - 10.4 MG/DL Final   Bilirubin, total 0.3   0.2 - 1.1 MG/DL Final   ALT (SGPT) 23   12 - 65 U/L Final   AST (SGOT) 31   15 - 37 U/L Final   Alk. phosphatase 121   50 - 136 U/L Final   Protein, total 6.3   6.3 - 8.2 g/dL Final   Albumin 2.1  Low   3.5 - 5.0 g/dL Final   Globulin 4.2  High   2.3 - 3.5 g/dL Final   A-G Ratio 0.5  Low   1.2 - 3.5   Final     CBC WITH AUTOMATED DIFF [[id number]] (Order [id number])  Lab  Date: 1/14/2021 Department: [facility]  Med Surg Released By/Authorizing: Mercedes Smoker, DO (auto-released)   Component Value Flag Ref Range Units Status   WBC 14.3  High   4.3 - 11.1 K/uL Final   RBC 4.51   4.05 - 5.2 M/uL Final   HGB 11.2  Low   11.7 - 15.4 g/dL Final   HCT 35.5  Low   35.8 - 46.3 % Final   MCV 78.7  Low   79.6 - 97.8 FL Final   MCH 24.8  Low   26.1 - 32.9 PG Final   MCHC 31.5   31.4 - 35.0 g/dL Final   RDW 19.9  High   11.9 - 14.6 % Final   PLATELET 592  High   150 - 450 K/uL Final   MPV 9.6   9.4 - 12.3 FL Final   ABSOLUTE NRBC 0.03   0.0 - 0.2 K/uL Final   Comment:   **Note: Absolute NRBC parameter is now reported with Hemogram**   NEUTROPHILS 73   43 - 78 % Final   LYMPHOCYTES 20   13 - 44 % Final   MONOCYTES 4   4.0 - 12.0 % Final   EOSINOPHILS 0  Low   0.5 - 7.8 % Final   BASOPHILS 0   0.0 - 2.0 % Final   IMMATURE GRANULOCYTES 3   0.0 - 5.0 % Final   ABS. NEUTROPHILS 10.4  High   1.7 - 8.2 K/UL Final   ABS. LYMPHOCYTES 2.9   0.5 - 4.6 K/UL Final   ABS. MONOCYTES 0.6   0.1 - 1.3 K/UL Final   ABS. EOSINOPHILS 0.0   0.0 - 0.8 K/UL Final   ABS. BASOPHILS 0.0   0.0 - 0.2 K/UL Final   ABS. IMM. GRANS.  0.4   0.0 - 0.5 K/UL Final   DF AUTOMATED      Final         Disposition: home    Discharge Medications:   Current Discharge Medication List      START taking these medications    Details   ascorbic acid, vitamin C, (VITAMIN C) 1,000 mg tablet Take 1 Tab by mouth three (3) times daily for 5 days. Qty: 15 Tab, Refills: 0      NIFEdipine (PROCARDIA) 20 mg capsule Take 1 Cap by mouth every six (6) hours for 15 days. Qty: 60 Cap, Refills: 0      zinc sulfate 220 mg tablet Take 1 Tab by mouth daily for 5 days. Qty: 5 Tab, Refills: 0      dexAMETHasone (DECADRON) 6 mg tablet Take 1 Tab by mouth Daily (before breakfast) for 5 days. Qty: 5 Tab, Refills: 0         CONTINUE these medications which have NOT CHANGED    Details   Iron Fum & PS Cmp-FA-Vit C-B3 (Integra F) 125-1-40-3 mg cap Take 1 Each by mouth daily. Indications: anemia from inadequate iron  Qty: 30 Cap, Refills: 4      Blood Pressure Test Kit-Medium kit 1 Each by Does Not Apply route daily. Prescribe cuff her insurance will cover  Qty: 1 Kit, Refills: 0      famotidine (PEPCID) 20 mg tablet Take 1 Tab by mouth two (2) times a day. Indications: gastroesophageal reflux disease  Qty: 60 Tab, Refills: 4      cholecalciferol, vitamin D3, (Vitamin D3) 50 mcg (2,000 unit) tab Take  by mouth. aspirin 81 mg chewable tablet Take 162 mg by mouth daily. calcium carbonate (TUMS) 200 mg calcium (500 mg) chew Take 1 Tab by mouth daily. STOP taking these medications       progesterone (PROMETRIUM) 200 mg capsule Comments:   Reason for Stopping:               Activity: Activity as tolerated  Diet: Cardiac Diet  Wound Care: Keep wound clean and dry    Follow-up Appointments   Procedures    FOLLOW UP VISIT Appointment in: Other (Specify) See your primary doctor in 3-5 days. See obstetrician in 1-2 weeks     See your primary doctor in 3-5 days. See obstetrician in 1-2 weeks     Standing Status:   Standing     Number of Occurrences:   1     Order Specific Question:   Appointment in     Answer: Other (Specify)     I have discussed the plan of care with patient. Time spent on discharge is 40  minutes. Signed By: Finn Tinajero MD     January 16, 2021

## 2021-01-16 NOTE — PROGRESS NOTES
Progress Note    Patient: Elenora Harada MRN: [id number]  SSN: [id number]    YOB: 1985  Age: 31 y.o. Sex: female      Admit Date: 1/10/2021    LOS: 6 days     Subjective:     Patient with past medical history of    COVID infection since 2021  Pre-eclampsia     Admitted to [place] s/p  section on 2021     On room air until 1-. CXR shows bilateral infiltrates. Feeling much better and is on oxygen cannula now. 6 LPM.     Objective:     Vitals:    01/15/21 2341 21 0417 21 0806 21 0857   BP: 130/86 126/80 115/80    Pulse: 87 85 90    Resp: 19 20 18    Temp: 97.8 °F (36.6 °C) 98.3 °F (36.8 °C) 98.1 °F (36.7 °C)    SpO2: 100% 100% 96% 99%        Intake and Output:  Current Shift: No intake/output data recorded. Last three shifts:  1901 -  0700  In: 320 [P.O.:320]  Out: -     Physical Exam:     General:                    The patient is a pleasant female in mild acute respiratory distress. On oxygen cannula 6 LPM. She is sitting up in her chair and talking well. Head:                                   Normocephalic/atraumatic. Eyes:                                   No palpebral pallor or scleral icterus. ENT:                                    External auricular and nasal exam within normal limits. Mucous membranes are moist.  Neck:                                   Supple, non-tender, no JVD. Lungs:                       clear to auscultation bilaterally without wheezes or crackles. No respiratory distress or accessory muscle use. Heart:                                  Regular rate and rhythm, without murmurs, rubs, or gallops. Abdomen:                  Soft, non-tender, non-distended with normoactive bowel sounds. Genitourinary:           No tenderness over the bladder or bilateral CVAs.   Extremities:               Without clubbing, cyanosis, or edema.  Skin:                                    Normal color, texture, and turgor. No rashes, lesions, or jaundice. Pulses:                      Radial and dorsalis pedis pulses present 2+ bilaterally. Capillary refill <2s. Neurologic:                CN II-XII grossly intact and symmetrical.                                               Moving all four extremities well with no focal deficits. Psychiatric:                Pleasant demeanor, appropriate affect. Alert and oriented x 3      Lab/Data Review:    Recent Results (from the past 24 hour(s))   GLUCOSE, POC    Collection Time: 01/15/21 11:13 AM   Result Value Ref Range    Glucose (POC) 197 (H) 65 - 100 mg/dL   GLUCOSE, POC    Collection Time: 01/15/21  3:52 PM   Result Value Ref Range    Glucose (POC) 110 (H) 65 - 100 mg/dL   GLUCOSE, POC    Collection Time: 01/16/21  1:24 AM   Result Value Ref Range    Glucose (POC) 181 (H) 65 - 100 mg/dL   GLUCOSE, POC    Collection Time: 01/16/21  7:26 AM   Result Value Ref Range    Glucose (POC) 134 (H) 65 - 100 mg/dL     Results     Procedure Component Value Units Date/Time    CULTURE, BLOOD [[id number]] Collected: 01/09/21 0950    Order Status: Completed Specimen: Blood Updated: 01/14/21 0739     Special Requests: --        LEFT  HAND       Culture result: NO GROWTH 5 DAYS       CULTURE, BLOOD [[id number]] Collected: 01/09/21 0940    Order Status: Completed Specimen: Blood Updated: 01/14/21 0739     Special Requests: --        LEFT  ARM       Culture result: NO GROWTH 5 DAYS       INFLUENZA A & B AG (RAPID TEST) [[id number]] Collected: 01/07/21 0512    Order Status: Completed Specimen: Nasopharyngeal from Nasal washing Updated: 01/07/21 0530     Influenza A Ag Negative        Comment: NEGATIVE FOR THE PRESENCE OF INFLUENZA A ANTIGEN  INFECTION DUE TO INFLUENZA A CANNOT BE RULED OUT. BECAUSE THE ANTIGEN PRESENT IN THE SAMPLE MAY BE BELOW  THE DETECTION LIMIT OF THE TEST.   A NEGATIVE TEST IS PRESUMPTIVE AND IT IS RECOMMENDED THAT THESE RESULTS BE CONFIRMED BY VIRAL CULTURE OR AN FDA-CLEARED INFLUENZA A AND B MOLECULAR ASSAY. Influenza B Ag Negative        Comment: NEGATIVE FOR THE PRESENCE OF INFLUENZA B ANTIGEN  INFECTION DUE TO INFLUENZA B CANNOT BE RULED OUT. BECAUSE THE ANTIGEN PRESENT IN THE SAMPLE MAY BE BELOW  THE DETECTION LIMIT OF THE TEST. A NEGATIVE TEST IS PRESUMPTIVE AND IT IS RECOMMENDED THAT THESE RESULTS BE CONFIRMED BY VIRAL CULTURE OR AN FDA-CLEARED INFLUENZA A AND B MOLECULAR ASSAY. Source Nasopharyngeal       CULTURE, URINE [[id number]] Collected: 01/07/21 0512    Order Status: Completed Specimen: Cath Urine Updated: 01/09/21 0724     Special Requests: NO SPECIAL REQUESTS        Culture result: NO GROWTH 2 DAYS       CULTURE, GENITAL GROUP B STREP [[id number]]  (Abnormal) Collected: 01/07/21 0447    Order Status: Completed Specimen: VAGINAL/RECTAL Updated: 01/11/21 0952     Special Requests: NO SPECIAL REQUESTS        Culture result:       STREPTOCOCCI, BETA HEMOLYTIC GROUP B                  GROUP B STREPTOCOCCI REMAIN UNIVERSALLY SUSCEPTIBLE TO PENICILLIN, AMPICILLIN, AND CEFAZOLIN. RESISTANCE TO CLINDAMYCIN AND ERYTHROMYCIN CAN OCCUR. PLEASE CONTACT LABORATORY WITHIN 48 HOURS IF ERYTHROMYCIN AND CLINDAMYCIN SUSCEPTIBILITY TESTING IS NEEDED. GRP B STREP SCRN BY PCR [[id number]]  (Abnormal) Collected: 01/07/21 0447    Order Status: Completed Specimen: VAGINAL/RECTAL Updated: 01/07/21 0644     Special Requests: NO SPECIAL REQUESTS        Culture result:       POSITIVE: GBS target nucleic acid is detected. Presumptive for GBS colonization. RESULTS VERIFIED, PHONED TO AND READ BACK BY  WES [address]ADO [address]  ON 1/7/21 HA      CULTURE, URINE [[id number]] Collected: 01/07/21 0344    Order Status: Canceled Specimen: Urine from Clean catch         CT chest   1-9-2021  Impression:   1.  No evidence of PE.  2. Bilateral infiltrates in keeping with Covid-19, similar to recent  radiography. 3. Trace pericardial effusion and trace left pleural effusion. XR chest  1-  Impression:       1. Slightly improving aeration with decreasing consolidative changes along the  peripheral margin of left lung.     TTE   1-    SUMMARY:     -  Left ventricle: Systolic function was normal. Ejection fraction was  estimated in the range of 60 % to 65 %.  There were no regional wall motion  abnormalities.         Current Facility-Administered Medications:     docusate sodium (COLACE) capsule 100 mg, 100 mg, Oral, DAILY PRN, González Cleary MD    polyethylene glycol (MIRALAX) packet 17 g, 17 g, Oral, DAILY PRN, González Cleary MD    simethicone (MYLICON) tablet 80 mg, 80 mg, Oral, QID PRN, González Cleary MD    albuterol (PROVENTIL HFA, VENTOLIN HFA, PROAIR HFA) inhaler 4 Puff, 4 Puff, Inhalation, Q6H PRN, González Cleary MD    albuterol (PROVENTIL HFA, VENTOLIN HFA, PROAIR HFA) inhaler 2 Puff, 2 Puff, Inhalation, TID RT, González Cleary MD, 2 Puff at 01/16/21 0857    dexamethasone (DECADRON) 10 mg/mL injection 6 mg, 6 mg, IntraVENous, Q8H, Judy Jose, DO, 6 mg at 01/16/21 0508    insulin lispro (HUMALOG) injection 0-10 Units, 0-10 Units, SubCUTAneous, AC&HS, Tawnya Jose, DO, Stopped at 01/16/21 0745    [COMPLETED] remdesivir 200 mg in 0.9% sodium chloride 250 mL IVPB, 200 mg, IntraVENous, ONCE, 200 mg at 01/12/21 1132 **FOLLOWED BY** remdesivir 100 mg in 0.9% sodium chloride 250 mL IVPB, 100 mg, IntraVENous, Q24H, Judy Jose, DO, 100 mg at 01/15/21 1338    enoxaparin (LOVENOX) injection 30 mg, 30 mg, SubCUTAneous, Q12H, Judy Jose, DO, 30 mg at 01/16/21 0847    0.9% sodium chloride infusion 50 mL remdesivir flush, 50 mL, IntraVENous, Q24H, Judy Jose, , 50 mL at 01/14/21 1305    dextrose 40% (GLUTOSE) oral gel 1 Tube, 15 g, Oral, PRN, Judy Jose DO    glucagon (GLUCAGEN) injection 1 mg, 1 mg, IntraMUSCular, PRN, Judy Jose,     dextrose (D50W) injection syrg 12.5-25 g, 25-50 mL, IntraVENous, PRN, Judy Jose,     furosemide (LASIX) injection 20 mg, 20 mg, IntraVENous, Q12H, Gretel Hester MD, 20 mg at 21 0848    diphenhydrAMINE (BENADRYL) injection 12.5 mg, 12.5 mg, IntraVENous, Q6H PRN, Brian Mckee MD    promethazine (PHENERGAN) with saline injection 6.25 mg, 6.25 mg, IntraVENous, Q6H PRN, Brian Mckee MD    0.9% sodium chloride infusion 250 mL, 250 mL, IntraVENous, PRN, Brian Mckee MD    ascorbic acid (vitamin C) (VITAMIN C) tablet 1,000 mg, 1,000 mg, Oral, TID, Brooke Mckee MD, 1,000 mg at 21 0848    ibuprofen (MOTRIN) tablet 800 mg, 800 mg, Oral, Q6H PRN, Brooke Mckee MD, 800 mg at 21 1930    NIFEdipine (PROCARDIA) capsule 20 mg, 20 mg, Oral, Q6H, Brian Mckee MD, 20 mg at 01/15/21 1338    zinc sulfate tablet 220 mg, 220 mg, Oral, DAILY, Brooke Mckee MD, 220 mg at 21 0848    metoprolol (LOPRESSOR) injection 5 mg, 5 mg, IntraVENous, Q6H PRN, Meño Edwards MD, 5 mg at 21    Assessment:     Principal Problem:    COVID-19 affecting pregnancy in third trimester (2021)      Overview: Sx 21      Positive 21      Precautions through 21    Active Problems:    Tachycardia (2021)      H/O  section (2021)      SOB (shortness of breath) (2021)      Pneumonia due to COVID-19 virus (2021)      Acute respiratory failure with hypoxia ([facility] [address] (2021)        Plan:     Acute respiratory failure with hypoxia   COVID pneumonia   S/P convalescent plasma, Decadron  Continue oxygen supplementation and wean as tolerated. Empiric IV antibiotics, Ceftriaxone and Doxycycline. On Remdesivir. Requiring less oxygen compared to yesterday. Pre-eclampsia   Continue Procardia. Monitor BP. BP is controlled now.      Post partum   S/P  section     I have discussed the plan of care with patient. DVT prophylaxis : SCD     Disposition plan :   Will try walking test for oxygen requirement when resting oxygen requirement is less than 5 LPM.     Signed By: Sienna Silva MD     2021

## 2021-01-16 NOTE — PROGRESS NOTES
Oxygen Qualifier       Room air: SpO2 with O2 and liter flow   Resting SpO2  93%    Ambulating SpO2  91%          Completed by:    Carina Bellamy

## 2021-01-16 NOTE — PROGRESS NOTES
had a nurse check with patient to see if we are face time today with     Patient said no    She has already talked with her  this morning.

## 2021-01-29 PROBLEM — U07.1 COVID-19 AFFECTING PREGNANCY IN THIRD TRIMESTER: Status: RESOLVED | Noted: 2021-01-07 | Resolved: 2021-01-29

## 2021-01-29 PROBLEM — O09.299: Status: RESOLVED | Noted: 2020-07-20 | Resolved: 2021-01-29

## 2021-01-29 PROBLEM — O36.5130 PLACENTAL INSUFFICIENCY AFFECTING MANAGEMENT OF MOTHER IN THIRD TRIMESTER: Status: RESOLVED | Noted: 2020-11-16 | Resolved: 2021-01-29

## 2021-01-29 PROBLEM — Z67.91 RH NEGATIVE STATUS DURING PREGNANCY: Status: RESOLVED | Noted: 2020-07-20 | Resolved: 2021-01-29

## 2021-01-29 PROBLEM — O26.899 RH NEGATIVE STATUS DURING PREGNANCY: Status: RESOLVED | Noted: 2020-07-20 | Resolved: 2021-01-29

## 2021-01-29 PROBLEM — O10.913 CHRONIC HYPERTENSION WITH EXACERBATION DURING PREGNANCY IN THIRD TRIMESTER: Status: RESOLVED | Noted: 2020-10-19 | Resolved: 2021-01-29

## 2021-01-29 PROBLEM — O60.03 PRETERM LABOR IN THIRD TRIMESTER WITHOUT DELIVERY: Status: RESOLVED | Noted: 2021-01-07 | Resolved: 2021-01-29

## 2021-01-29 PROBLEM — O11.9 PRE-ECLAMPSIA SUPERIMPOSED ON CHRONIC HYPERTENSION: Status: RESOLVED | Noted: 2021-01-07 | Resolved: 2021-01-29

## 2021-01-29 PROBLEM — R82.71 GBS BACTERIURIA: Status: RESOLVED | Noted: 2020-01-14 | Resolved: 2021-01-29

## 2021-01-29 PROBLEM — O99.013 ANEMIA DURING PREGNANCY IN THIRD TRIMESTER: Status: RESOLVED | Noted: 2020-12-28 | Resolved: 2021-01-29

## 2021-01-29 PROBLEM — O98.513 COVID-19 AFFECTING PREGNANCY IN THIRD TRIMESTER: Status: RESOLVED | Noted: 2021-01-07 | Resolved: 2021-01-29

## 2021-01-29 PROBLEM — O36.5990 PREGNANCY AFFECTED BY FETAL GROWTH RESTRICTION: Status: RESOLVED | Noted: 2020-09-17 | Resolved: 2021-01-29

## 2021-01-29 PROBLEM — O09.93 HIGH-RISK PREGNANCY IN THIRD TRIMESTER: Status: RESOLVED | Noted: 2020-07-20 | Resolved: 2021-01-29

## 2021-02-02 NOTE — PROGRESS NOTES
Referral made to [facility]  home visit program.    [address]   [phone]

## 2021-02-23 PROBLEM — O34.13 LEIOMYOMA OF UTERUS AFFECTING PREGNANCY IN THIRD TRIMESTER: Status: RESOLVED | Noted: 2020-07-20 | Resolved: 2021-02-23

## 2021-02-23 PROBLEM — D25.9 LEIOMYOMA OF UTERUS AFFECTING PREGNANCY IN THIRD TRIMESTER: Status: RESOLVED | Noted: 2020-07-20 | Resolved: 2021-02-23

## 2022-03-19 PROBLEM — Z98.891 H/O CESAREAN SECTION: Status: ACTIVE | Noted: 2021-01-11

## 2023-03-27 ENCOUNTER — INITIAL PRENATAL (OUTPATIENT)
Dept: OBGYN CLINIC | Age: 38
End: 2023-03-27

## 2023-03-27 VITALS — WEIGHT: 157.4 LBS | BODY MASS INDEX: 29.72 KG/M2 | HEIGHT: 61 IN

## 2023-03-27 DIAGNOSIS — O36.80X0 ULTRASOUND SCAN TO CONFIRM FETAL VIABILITY WITH HISTORY OF MISCARRIAGE: ICD-10-CM

## 2023-03-27 DIAGNOSIS — Z34.81 PRENATAL CARE, SUBSEQUENT PREGNANCY, FIRST TRIMESTER: Primary | ICD-10-CM

## 2023-03-27 DIAGNOSIS — D21.9 FIBROIDS: ICD-10-CM

## 2023-03-27 DIAGNOSIS — Z86.19 HISTORY OF CHLAMYDIA: ICD-10-CM

## 2023-03-27 DIAGNOSIS — Z32.01 PREGNANCY TEST POSITIVE: ICD-10-CM

## 2023-03-27 DIAGNOSIS — O09.521 MULTIGRAVIDA OF ADVANCED MATERNAL AGE IN FIRST TRIMESTER: ICD-10-CM

## 2023-03-27 DIAGNOSIS — Z3A.09 9 WEEKS GESTATION OF PREGNANCY: ICD-10-CM

## 2023-03-27 DIAGNOSIS — Z87.59 ULTRASOUND SCAN TO CONFIRM FETAL VIABILITY WITH HISTORY OF MISCARRIAGE: ICD-10-CM

## 2023-03-27 PROBLEM — O41.8X90 SUBCHORIONIC HEMORRHAGE: Status: ACTIVE | Noted: 2023-03-27

## 2023-03-27 PROBLEM — O09.529 ADVANCED MATERNAL AGE IN MULTIGRAVIDA: Status: ACTIVE | Noted: 2023-03-27

## 2023-03-27 PROBLEM — O46.8X9 SUBCHORIONIC HEMORRHAGE: Status: ACTIVE | Noted: 2023-03-27

## 2023-03-27 PROBLEM — D25.9 UTERINE FIBROIDS AFFECTING PREGNANCY: Status: ACTIVE | Noted: 2023-03-27

## 2023-03-27 PROBLEM — O34.10 UTERINE FIBROIDS AFFECTING PREGNANCY: Status: ACTIVE | Noted: 2023-03-27

## 2023-03-27 PROBLEM — Z87.51 HISTORY OF PRETERM DELIVERY: Status: ACTIVE | Noted: 2023-03-27

## 2023-03-27 LAB
ABO + RH BLD: NORMAL
BASOPHILS # BLD: 0 K/UL (ref 0–0.2)
BASOPHILS NFR BLD: 1 % (ref 0–2)
BLOOD GROUP ANTIBODIES SERPL: NORMAL
DIFFERENTIAL METHOD BLD: ABNORMAL
EOSINOPHIL # BLD: 0.3 K/UL (ref 0–0.8)
EOSINOPHIL NFR BLD: 4 % (ref 0.5–7.8)
ERYTHROCYTE [DISTWIDTH] IN BLOOD BY AUTOMATED COUNT: 18.6 % (ref 11.9–14.6)
HCG, PREGNANCY, URINE, POC: POSITIVE
HCT VFR BLD AUTO: 34.6 % (ref 35.8–46.3)
HGB BLD-MCNC: 10.4 G/DL (ref 11.7–15.4)
IMM GRANULOCYTES # BLD AUTO: 0 K/UL (ref 0–0.5)
IMM GRANULOCYTES NFR BLD AUTO: 0 % (ref 0–5)
LYMPHOCYTES # BLD: 2.9 K/UL (ref 0.5–4.6)
LYMPHOCYTES NFR BLD: 36 % (ref 13–44)
MCH RBC QN AUTO: 23.3 PG (ref 26.1–32.9)
MCHC RBC AUTO-ENTMCNC: 30.1 G/DL (ref 31.4–35)
MCV RBC AUTO: 77.6 FL (ref 82–102)
MONOCYTES # BLD: 0.8 K/UL (ref 0.1–1.3)
MONOCYTES NFR BLD: 10 % (ref 4–12)
NEUTS SEG # BLD: 3.9 K/UL (ref 1.7–8.2)
NEUTS SEG NFR BLD: 50 % (ref 43–78)
NRBC # BLD: 0 K/UL (ref 0–0.2)
PLATELET # BLD AUTO: 441 K/UL (ref 150–450)
PMV BLD AUTO: 11.9 FL (ref 9.4–12.3)
RBC # BLD AUTO: 4.46 M/UL (ref 4.05–5.2)
VALID INTERNAL CONTROL, POC: YES
WBC # BLD AUTO: 7.9 K/UL (ref 4.3–11.1)

## 2023-03-27 NOTE — PROGRESS NOTES
Gabriel Hastings G3,  presents to the office today for NOB talk and ultrasound. EDC is 10/27/2023 based off of 2023. Patient education was discussed including: nutrition, appropriate weight gain, diet, exercise, travel, hospital classes, breastfeeding/lactation services, flu vaccine, Tdap, glucola, GBS, and Corona Virus and Zika precautions. Genetic testing discussed in depth and patient elects NIPT. Patients past medical history is significant for  delivery at  35 1/7 by C/S, Uterine fibroids (difficult C/S), Fetal demise at 16 wks  (delivered at home). Pt has history of Chlamydia in  and anemia. She is to return to the office in 3 weeks for NOB exam. All questions answered and she voiced full understanding. She is encouraged to call the office with any questions or concerns.

## 2023-03-28 LAB
HBV SURFACE AG SER QL: NONREACTIVE
HCV AB SER QL: NONREACTIVE
HIV 1+2 AB+HIV1 P24 AG SERPL QL IA: NONREACTIVE
HIV 1/2 RESULT COMMENT: NORMAL
RPR SER QL: NONREACTIVE
RUBV IGG SERPL IA-ACNC: 133 IU/ML

## 2023-03-29 ENCOUNTER — TELEPHONE (OUTPATIENT)
Dept: OBGYN CLINIC | Age: 38
End: 2023-03-29

## 2023-03-29 PROBLEM — O99.019 ANEMIA IN PREGNANCY: Status: ACTIVE | Noted: 2023-03-29

## 2023-03-29 PROBLEM — O26.899 RH NEGATIVE STATUS DURING PREGNANCY: Status: ACTIVE | Noted: 2023-03-29

## 2023-03-29 PROBLEM — Z67.91 RH NEGATIVE STATUS DURING PREGNANCY: Status: ACTIVE | Noted: 2023-03-29

## 2023-03-29 LAB — VZV IGG SER IA-ACNC: 250 INDEX

## 2023-03-29 NOTE — TELEPHONE ENCOUNTER
----- Message from ADDIE Silva - CNP sent at 3/29/2023  9:01 AM EDT -----  Overall normal  Daily fe supplement  Still waiting on fractionated hgb  Rh neg

## 2023-03-30 LAB
BACTERIA SPEC CULT: NORMAL
BACTERIA SPEC CULT: NORMAL
HGB A MFR BLD: 97.9 % (ref 96.4–98.8)
HGB A2 MFR BLD COLUMN CHROM: 2.1 % (ref 1.8–3.2)
HGB F MFR BLD: 0 % (ref 0–2)
HGB FRACT BLD-IMP: NORMAL
HGB S MFR BLD: 0 %
SERVICE CMNT-IMP: NORMAL

## 2023-04-18 ENCOUNTER — ROUTINE PRENATAL (OUTPATIENT)
Dept: OBGYN CLINIC | Age: 38
End: 2023-04-18

## 2023-04-18 VITALS — WEIGHT: 157 LBS | SYSTOLIC BLOOD PRESSURE: 126 MMHG | BODY MASS INDEX: 29.66 KG/M2 | DIASTOLIC BLOOD PRESSURE: 80 MMHG

## 2023-04-18 DIAGNOSIS — Z13.89 SCREENING FOR GENITOURINARY CONDITION: ICD-10-CM

## 2023-04-18 DIAGNOSIS — Z11.3 SCREENING EXAMINATION FOR VENEREAL DISEASE: ICD-10-CM

## 2023-04-18 DIAGNOSIS — Z3A.12 12 WEEKS GESTATION OF PREGNANCY: ICD-10-CM

## 2023-04-18 DIAGNOSIS — Z11.8 SCREENING FOR VIRAL AND CHLAMYDIAL DISEASES: ICD-10-CM

## 2023-04-18 DIAGNOSIS — Z11.59 SCREENING FOR VIRAL AND CHLAMYDIAL DISEASES: ICD-10-CM

## 2023-04-18 DIAGNOSIS — Z34.81 PRENATAL CARE, SUBSEQUENT PREGNANCY, FIRST TRIMESTER: Primary | ICD-10-CM

## 2023-04-18 DIAGNOSIS — Z13.79 GENETIC TESTING: ICD-10-CM

## 2023-04-18 DIAGNOSIS — O09.521 MULTIGRAVIDA OF ADVANCED MATERNAL AGE IN FIRST TRIMESTER: ICD-10-CM

## 2023-04-18 LAB
GLUCOSE URINE, POC: NEGATIVE
PROTEIN,URINE, POC: NEGATIVE

## 2023-04-18 PROCEDURE — 99902 PR PRENATAL VISIT: CPT | Performed by: OBSTETRICS & GYNECOLOGY

## 2023-04-18 NOTE — PROGRESS NOTES
FH S>D d/t fibroids  Discussed glucola,rhogam, wt gain,diet,U/S,exercise. All ?s answered, wants NIPT  Will have repeat C section at 39 weeks unless PIH sxs return sooner. Seeing MFM next week, and large fibroids and Hx of severe PIH.

## 2023-04-20 PROBLEM — O09.521 MULTIGRAVIDA OF ADVANCED MATERNAL AGE IN FIRST TRIMESTER: Status: ACTIVE | Noted: 2023-03-27

## 2023-04-20 PROBLEM — O99.011 ANEMIA DURING PREGNANCY IN FIRST TRIMESTER: Status: ACTIVE | Noted: 2023-03-29

## 2023-04-21 LAB
C TRACH RRNA SPEC QL NAA+PROBE: NEGATIVE
N GONORRHOEA RRNA SPEC QL NAA+PROBE: NEGATIVE
SPECIMEN SOURCE: NORMAL
T VAGINALIS RRNA SPEC QL NAA+PROBE: NEGATIVE

## 2023-04-24 ENCOUNTER — ROUTINE PRENATAL (OUTPATIENT)
Dept: OBGYN CLINIC | Age: 38
End: 2023-04-24
Payer: COMMERCIAL

## 2023-04-24 VITALS — SYSTOLIC BLOOD PRESSURE: 131 MMHG | DIASTOLIC BLOOD PRESSURE: 95 MMHG

## 2023-04-24 DIAGNOSIS — O99.011 ANEMIA DURING PREGNANCY IN FIRST TRIMESTER: ICD-10-CM

## 2023-04-24 DIAGNOSIS — Z87.51 HISTORY OF PRETERM DELIVERY: ICD-10-CM

## 2023-04-24 DIAGNOSIS — O09.521 HIGH RISK PREGNANCY, MULTIGRAVIDA OF ADVANCED MATERNAL AGE IN FIRST TRIMESTER: Primary | ICD-10-CM

## 2023-04-24 DIAGNOSIS — Z98.891 H/O CESAREAN SECTION: ICD-10-CM

## 2023-04-24 DIAGNOSIS — O34.11 LEIOMYOMA OF UTERUS AFFECTING PREGNANCY IN FIRST TRIMESTER: ICD-10-CM

## 2023-04-24 DIAGNOSIS — Z3A.13 13 WEEKS GESTATION OF PREGNANCY: ICD-10-CM

## 2023-04-24 DIAGNOSIS — D25.9 LEIOMYOMA OF UTERUS AFFECTING PREGNANCY IN FIRST TRIMESTER: ICD-10-CM

## 2023-04-24 PROBLEM — Z86.19 HISTORY OF CHLAMYDIA: Status: RESOLVED | Noted: 2023-03-27 | Resolved: 2023-04-24

## 2023-04-24 PROCEDURE — 99205 OFFICE O/P NEW HI 60 MIN: CPT | Performed by: OBSTETRICS & GYNECOLOGY

## 2023-04-24 PROCEDURE — G8419 CALC BMI OUT NRM PARAM NOF/U: HCPCS | Performed by: OBSTETRICS & GYNECOLOGY

## 2023-04-24 PROCEDURE — 76801 OB US < 14 WKS SINGLE FETUS: CPT | Performed by: OBSTETRICS & GYNECOLOGY

## 2023-04-24 PROCEDURE — G8427 DOCREV CUR MEDS BY ELIG CLIN: HCPCS | Performed by: OBSTETRICS & GYNECOLOGY

## 2023-04-24 PROCEDURE — 96127 BRIEF EMOTIONAL/BEHAV ASSMT: CPT | Performed by: OBSTETRICS & GYNECOLOGY

## 2023-04-24 PROCEDURE — 76813 OB US NUCHAL MEAS 1 GEST: CPT | Performed by: OBSTETRICS & GYNECOLOGY

## 2023-04-24 PROCEDURE — 1036F TOBACCO NON-USER: CPT | Performed by: OBSTETRICS & GYNECOLOGY

## 2023-04-24 RX ORDER — FERROUS FUMARATE AND POLYSACCHRIDE IRON VITAMIN MINERAL COMPLEX SUPPLEMENT 191.2; 135.9; 1; 210; 20; 5; 5; 7; 25; 3 MG/1; MG/1; MG/1; MG/1; MG/1; MG/1; MG/1; MG/1; MG/1; UG/1
CAPSULE ORAL
Qty: 30 CAPSULE | Refills: 5 | Status: SHIPPED | OUTPATIENT
Start: 2023-04-24

## 2023-04-24 ASSESSMENT — PATIENT HEALTH QUESTIONNAIRE - PHQ9
SUM OF ALL RESPONSES TO PHQ QUESTIONS 1-9: 0
2. FEELING DOWN, DEPRESSED OR HOPELESS: 0
SUM OF ALL RESPONSES TO PHQ QUESTIONS 1-9: 0
SUM OF ALL RESPONSES TO PHQ9 QUESTIONS 1 & 2: 0
SUM OF ALL RESPONSES TO PHQ QUESTIONS 1-9: 0
1. LITTLE INTEREST OR PLEASURE IN DOING THINGS: 0
SUM OF ALL RESPONSES TO PHQ QUESTIONS 1-9: 0

## 2023-04-24 NOTE — ASSESSMENT & PLAN NOTE
Anemia noted on recent lab studies. Physiologic anemia of pregnancy is usually mild. Continue iron supplementation. ? Counseled to take iron with vitamin C, and to avoid taking with iron rich meals. ?   Take with stool softener, if not included in iron formulation. Check CBC in 4 weeks with TIBC and serum ferritin. ? If Hgb<9.5 or low iron stores, refer to hematology for consult and iron infusions.

## 2023-04-24 NOTE — ASSESSMENT & PLAN NOTE
Patient has a history of  delivery secondary to significant COVID complications requiring ICU care. However, prior to this, Chris Forts had growth restriction and abnormal umbilical Doppler studies.

## 2023-04-24 NOTE — ASSESSMENT & PLAN NOTE
· F/U MFM 20 weeks anatomy and echo  · Low dose Aspirin 162  mg daily is recommended to be started at 12-16 weeks (some benefit seen with starting up to 28 weeks) for the prevention of preeclampsia  in high risk women.  (U.S. Preventative Services Task Force, Annals of Internal Medicine 2015)  ·   Stop Aspirin at 36 weeks. ·   Start Vitamin D 2000IU daily and Calcium 1000mg daily (or may take Viactiv calcium chews x 2 per day) to aid in protection of bones  and teeth. Advanced Maternal Age (Maternal Age 28 or greater at LENNIE)  First and Second Trimester  The risk of fetal aneuploidy based on maternal age should be reviewed with patient either with physicians or genetic counselor, or both. Testing for fetal aneuploidy can be divided into invasive and non-invasive tests.  Invasive testing, which includes amniocentesis and chorionic villus sampling, is diagnostic.  Non-invasive testing of maternal blood (for either hormone levels or cell-free fetal DNA), with or without ultrasound examination, is a screening test and requires follow-up testing of patients with screen-positive results. Given the increased risk of congenital anomalies in older women, a detailed second trimester ultrasound examination to assess for significant structural anomalies (particularly cardiac defects) is recommended. Other events that occur with increased frequency with increasing maternal age include hypertensive disease. For this reason, I recommend daily 162mg ASA for early/severe preeclampsia prevention. Third Trimester  There are no large randomized trials that have examined the efficacy of routine antepartum testing in women age 28 and older, therefore there is no consensus on the management of late pregnancy for this population. One strategy is to stratify women based on their risk factors, such as age and parity, and to consider other factors that might influence risk, such as pregestational obesity and race.

## 2023-04-24 NOTE — ASSESSMENT & PLAN NOTE
· Cervical surveillance at 18 weeks and serially until 24-28 weeks for concerns/symptoms. · Based on history, recommend serial growth evaluation in 3rd trimester  · PTL and cervical insufficiency precautions given.

## 2023-04-24 NOTE — PROGRESS NOTES
[facility] Consultation    Isamar Srivastava (: 1985) is a 41 y.o.  at 13w3d with 10/27/2023, by Last Menstrual Period. 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 Scheduled to see Primary OB ([facility]) on 23. Prior child doing well. Overall has been doing well prior to this pregnancy. Denies recent HTN. Will need to monitor closely. Fibroids are relatively stable. No HAs, edema. Denies preeclamptic symptoms. No bleeding, LOF, cramping, ctxs, or vaginal pressure. History reviewed and updated as needed. Review of Systems - per HPI; otherwise unremarkable. Exam:     Vitals:    23 1424   BP: (!) 131/95     Recent Labs Reviewed. Please see formal ultrasound report under imaging tab. ASSESSMENT/PLAN:  Patient Active Problem List    Diagnosis Date Noted    High risk pregnancy, multigravida of advanced maternal age in first trimester 2023     Priority: High     Overview Note:     NIPT-desires  GTT-  Flu-  Tdap-    Anatomy at [facility] referral placed 3/27/2023  04/24/23 [facility]: Normal NT and nasal bone. Genetic counseling done by MD.  Genetic testing pending. Assessment & Plan Note:     F/U [facility] 20 weeks anatomy and echo  Low dose Aspirin 162  mg daily is recommended to be started at 12-16 weeks (some benefit seen with starting up to 28 weeks) for the prevention of preeclampsia  in high risk women.  (U.S. Preventative Services Task Force, Annals of Internal Medicine 5693)    Stop Aspirin at 36 weeks. Start Vitamin D 2000IU daily and Calcium 1000mg daily (or may take Viactiv calcium chews x 2 per day) to aid in protection of bones  and teeth.     Advanced Maternal Age (Maternal Age 28 or greater at LENNIE)  First and Second Trimester  The risk of fetal aneuploidy based on maternal age should be reviewed with

## 2023-05-16 ENCOUNTER — ROUTINE PRENATAL (OUTPATIENT)
Dept: OBGYN CLINIC | Age: 38
End: 2023-05-16
Payer: COMMERCIAL

## 2023-05-16 VITALS — BODY MASS INDEX: 30.53 KG/M2 | WEIGHT: 161.6 LBS | DIASTOLIC BLOOD PRESSURE: 82 MMHG | SYSTOLIC BLOOD PRESSURE: 138 MMHG

## 2023-05-16 DIAGNOSIS — O99.011 ANEMIA DURING PREGNANCY IN FIRST TRIMESTER: ICD-10-CM

## 2023-05-16 DIAGNOSIS — Z3A.16 16 WEEKS GESTATION OF PREGNANCY: ICD-10-CM

## 2023-05-16 DIAGNOSIS — Z13.89 SCREENING FOR GENITOURINARY CONDITION: ICD-10-CM

## 2023-05-16 DIAGNOSIS — O09.521 HIGH RISK PREGNANCY, MULTIGRAVIDA OF ADVANCED MATERNAL AGE IN FIRST TRIMESTER: ICD-10-CM

## 2023-05-16 DIAGNOSIS — Z34.82 PRENATAL CARE, SUBSEQUENT PREGNANCY, SECOND TRIMESTER: Primary | ICD-10-CM

## 2023-05-16 LAB
GLUCOSE URINE, POC: NEGATIVE
PROTEIN,URINE, POC: NEGATIVE

## 2023-05-16 PROCEDURE — 99902 PR PRENATAL VISIT: CPT | Performed by: OBSTETRICS & GYNECOLOGY

## 2023-05-16 PROCEDURE — 81002 URINALYSIS NONAUTO W/O SCOPE: CPT | Performed by: OBSTETRICS & GYNECOLOGY

## 2023-06-07 ENCOUNTER — ROUTINE PRENATAL (OUTPATIENT)
Dept: OBGYN CLINIC | Age: 38
End: 2023-06-07
Payer: COMMERCIAL

## 2023-06-07 VITALS — DIASTOLIC BLOOD PRESSURE: 94 MMHG | SYSTOLIC BLOOD PRESSURE: 143 MMHG

## 2023-06-07 DIAGNOSIS — O34.12 LEIOMYOMA OF UTERUS AFFECTING PREGNANCY IN SECOND TRIMESTER: ICD-10-CM

## 2023-06-07 DIAGNOSIS — O46.8X2 SUBCHORIONIC HEMORRHAGE OF PLACENTA IN SECOND TRIMESTER, FETUS 1 OF MULTIPLE GESTATION: ICD-10-CM

## 2023-06-07 DIAGNOSIS — Z87.51 HISTORY OF PRETERM DELIVERY: ICD-10-CM

## 2023-06-07 DIAGNOSIS — O99.012 ANEMIA DURING PREGNANCY IN SECOND TRIMESTER: ICD-10-CM

## 2023-06-07 DIAGNOSIS — D25.9 LEIOMYOMA OF UTERUS AFFECTING PREGNANCY IN SECOND TRIMESTER: ICD-10-CM

## 2023-06-07 DIAGNOSIS — O09.522 HIGH RISK PREGNANCY, MULTIGRAVIDA OF ADVANCED MATERNAL AGE IN SECOND TRIMESTER: Primary | ICD-10-CM

## 2023-06-07 DIAGNOSIS — O41.8X21 SUBCHORIONIC HEMORRHAGE OF PLACENTA IN SECOND TRIMESTER, FETUS 1 OF MULTIPLE GESTATION: ICD-10-CM

## 2023-06-07 DIAGNOSIS — O36.5920 MATERNAL CARE FOR POOR FETAL GROWTH IN SECOND TRIMESTER, SINGLE OR UNSPECIFIED FETUS: ICD-10-CM

## 2023-06-07 DIAGNOSIS — Z3A.19 19 WEEKS GESTATION OF PREGNANCY: ICD-10-CM

## 2023-06-07 DIAGNOSIS — O16.2 HYPERTENSION AFFECTING PREGNANCY IN SECOND TRIMESTER: ICD-10-CM

## 2023-06-07 DIAGNOSIS — Z98.891 H/O CESAREAN SECTION: ICD-10-CM

## 2023-06-07 PROBLEM — O46.8X9 SUBCHORIONIC HEMORRHAGE: Status: RESOLVED | Noted: 2023-03-27 | Resolved: 2023-06-07

## 2023-06-07 PROBLEM — O41.8X90 SUBCHORIONIC HEMORRHAGE: Status: RESOLVED | Noted: 2023-03-27 | Resolved: 2023-06-07

## 2023-06-07 PROCEDURE — G8427 DOCREV CUR MEDS BY ELIG CLIN: HCPCS | Performed by: OBSTETRICS & GYNECOLOGY

## 2023-06-07 PROCEDURE — G8419 CALC BMI OUT NRM PARAM NOF/U: HCPCS | Performed by: OBSTETRICS & GYNECOLOGY

## 2023-06-07 PROCEDURE — 1036F TOBACCO NON-USER: CPT | Performed by: OBSTETRICS & GYNECOLOGY

## 2023-06-07 PROCEDURE — 76811 OB US DETAILED SNGL FETUS: CPT | Performed by: OBSTETRICS & GYNECOLOGY

## 2023-06-07 PROCEDURE — 99215 OFFICE O/P EST HI 40 MIN: CPT | Performed by: OBSTETRICS & GYNECOLOGY

## 2023-06-07 PROCEDURE — 96127 BRIEF EMOTIONAL/BEHAV ASSMT: CPT | Performed by: OBSTETRICS & GYNECOLOGY

## 2023-06-07 RX ORDER — NIFEDIPINE 30 MG/1
30 TABLET, FILM COATED, EXTENDED RELEASE ORAL DAILY
Qty: 60 TABLET | Refills: 3 | Status: SHIPPED | OUTPATIENT
Start: 2023-06-07

## 2023-06-07 ASSESSMENT — PATIENT HEALTH QUESTIONNAIRE - PHQ9
SUM OF ALL RESPONSES TO PHQ QUESTIONS 1-9: 0
2. FEELING DOWN, DEPRESSED OR HOPELESS: 0
SUM OF ALL RESPONSES TO PHQ QUESTIONS 1-9: 0
SUM OF ALL RESPONSES TO PHQ QUESTIONS 1-9: 0
1. LITTLE INTEREST OR PLEASURE IN DOING THINGS: 0
SUM OF ALL RESPONSES TO PHQ9 QUESTIONS 1 & 2: 0
2. FEELING DOWN, DEPRESSED OR HOPELESS: 0
SUM OF ALL RESPONSES TO PHQ QUESTIONS 1-9: 0
SUM OF ALL RESPONSES TO PHQ9 QUESTIONS 1 & 2: 0
SUM OF ALL RESPONSES TO PHQ QUESTIONS 1-9: 0
SUM OF ALL RESPONSES TO PHQ QUESTIONS 1-9: 0
1. LITTLE INTEREST OR PLEASURE IN DOING THINGS: 0
SUM OF ALL RESPONSES TO PHQ QUESTIONS 1-9: 0
SUM OF ALL RESPONSES TO PHQ QUESTIONS 1-9: 0

## 2023-06-07 NOTE — PROGRESS NOTES
increasing maternal age and is expected to continue rising. Traditionally, the definition of hypertension was systolic blood pressure >221 and/or diastolic >79 on at least two measures four or more hours apart. However, recent recommendations in the cardiology literature now suggest beginning treatment in nonpregnant adults with risk factors for current or future cardiovascular disease in patients with stage 1 hypertension (systolic blood pressure of 434-549 mm Hg or diastolic blood pressure of 80-89 mm Hg). The impact of this change on reproductive outcomes is unclear at this time. However, at this time, recommend continuing to treat patients already undergoing treatment prior to pregnancy as chronic hypertension in pregnancy. Until further data is available, would not begin treatment for blood pressures at this stage of hypertension in pregnancy. Increased surveillance is recommended as these patients had a higher risk of preeclampsia, gestational diabetes, and indicated  birth than normotensive patients. Chronic hypertension is considered to present prior to 20 weeks of gestation with gestational hypertension/preeclampsia developing after 20 weeks. Under diagnosis of chronic hypertension with third trimester exacerbation is common based on lack of blood pressure values available prior to initiation of prenatal care and the normal physiologic decrease in blood pressure in early pregnancy. Confounding the diagnosis further, approximately 11% of women with chronic hypertension have proteinuria at baseline. Up to 20-50% of women with chronic hypertension may develop superimposed preeclampsia, an incidence five times or more than that of pregnant women without hypertension.  There are currently no useful tools for predicting superimposed preeclampsia, but the risk of superimposed preeclampsia is higher in women who are , obese, smoke, have had hypertension for 4 years or more, have a

## 2023-06-07 NOTE — ASSESSMENT & PLAN NOTE
Fetal Growth Restriction  Fetuses with this growth pattern may be growth restricted or constitutionally small. As many as 79 percent of fetuses who are estimated to weigh below the 10th percentile for gestational age are small simply due to constitutional factors such as female sex or maternal ethnicity, parity, or body mass index; they are not at high risk of  mortality and morbidity. There is a real possibility of misclassifying these normally nourished, healthy, but constitutionally small, fetuses as growth restricted. By comparison, a malnourished fetus whose estimated weight is greater than the 10th percentile may be misclassified as appropriately grown and at low risk of adverse  outcome, even though its weight may be far below its genetic potential. In addition, dating discrepancies may exacerbate this issue. Interval growth rate and remaining on growth curve are more accurate methods of evaluating fetal growth for pathology. The etiology of fetal growth restriction can be broadly categorized into maternal, fetal, and placental. Although the primary pathophysiologic mechanisms underlying these conditions are different, they often (but not always) have the same final common pathway: suboptimal uterine-placental perfusion and fetal nutrition. Suboptimal perfusion of placenta may be etiology in 30-40% of these cases, while in about 20% there are genetic disorder or congenital malformations as cause. Many of the underlying conditions are not modifiable with the exception of tobacco use which increases risk by 3.5x. Use of other substances (alcohol, cocaine, narcotics) will also increase risk of suboptimal growth. Also modifiable are factors associated with energy imbalance due to poor maternal nutritional intake or excessive physical activity.      Symmetric growth restriction comprises 20 to 30 percent of IUGR pregnancies and refers to a growth pattern in which all fetal organs are
wear through 6 weeks postpartum.    · Recommend 20mg PO lasix x 5 days beginning PPD #1

## 2023-06-09 ENCOUNTER — HOSPITAL ENCOUNTER (OUTPATIENT)
Age: 38
Discharge: HOME | End: 2023-06-09
Attending: OBSTETRICS & GYNECOLOGY | Admitting: OBSTETRICS & GYNECOLOGY
Payer: COMMERCIAL

## 2023-06-09 VITALS
TEMPERATURE: 98.1 F | RESPIRATION RATE: 18 BRPM | SYSTOLIC BLOOD PRESSURE: 123 MMHG | DIASTOLIC BLOOD PRESSURE: 76 MMHG | HEART RATE: 86 BPM

## 2023-06-09 LAB
ALBUMIN SERPL-MCNC: 2.7 G/DL (ref 3.5–5)
ALBUMIN/GLOB SERPL: 0.7 (ref 0.4–1.6)
ALP SERPL-CCNC: 59 U/L (ref 50–136)
ALT SERPL-CCNC: 14 U/L (ref 12–65)
ANION GAP SERPL CALC-SCNC: 6 MMOL/L (ref 2–11)
AST SERPL-CCNC: 13 U/L (ref 15–37)
BILIRUB SERPL-MCNC: 0.2 MG/DL (ref 0.2–1.1)
BUN SERPL-MCNC: 7 MG/DL (ref 6–23)
CALCIUM SERPL-MCNC: 8.6 MG/DL (ref 8.3–10.4)
CHLORIDE SERPL-SCNC: 108 MMOL/L (ref 101–110)
CO2 SERPL-SCNC: 24 MMOL/L (ref 21–32)
CREAT SERPL-MCNC: 0.5 MG/DL (ref 0.6–1)
CREAT UR-MCNC: 21 MG/DL
ERYTHROCYTE [DISTWIDTH] IN BLOOD BY AUTOMATED COUNT: 18.9 % (ref 11.9–14.6)
GLOBULIN SER CALC-MCNC: 4 G/DL (ref 2.8–4.5)
GLUCOSE SERPL-MCNC: 109 MG/DL (ref 65–100)
HCT VFR BLD AUTO: 35 % (ref 35.8–46.3)
HGB BLD-MCNC: 10.9 G/DL (ref 11.7–15.4)
LDH SERPL L TO P-CCNC: 172 U/L (ref 100–190)
MCH RBC QN AUTO: 23.3 PG (ref 26.1–32.9)
MCHC RBC AUTO-ENTMCNC: 31.1 G/DL (ref 31.4–35)
MCV RBC AUTO: 74.9 FL (ref 82–102)
NRBC # BLD: 0 K/UL (ref 0–0.2)
PLATELET # BLD AUTO: 359 K/UL (ref 150–450)
PMV BLD AUTO: 9.3 FL (ref 9.4–12.3)
POTASSIUM SERPL-SCNC: 3.4 MMOL/L (ref 3.5–5.1)
PROT SERPL-MCNC: 6.7 G/DL (ref 6.3–8.2)
PROT UR-MCNC: 8 MG/DL
PROT/CREAT UR-RTO: 0.4
RBC # BLD AUTO: 4.67 M/UL (ref 4.05–5.2)
SODIUM SERPL-SCNC: 138 MMOL/L (ref 133–143)
URATE SERPL-MCNC: 2.9 MG/DL (ref 2.6–6)
WBC # BLD AUTO: 8.5 K/UL (ref 4.3–11.1)

## 2023-06-09 PROCEDURE — 84156 ASSAY OF PROTEIN URINE: CPT

## 2023-06-09 PROCEDURE — 85027 COMPLETE CBC AUTOMATED: CPT

## 2023-06-09 PROCEDURE — 82570 ASSAY OF URINE CREATININE: CPT

## 2023-06-09 PROCEDURE — 83615 LACTATE (LD) (LDH) ENZYME: CPT

## 2023-06-09 PROCEDURE — 84550 ASSAY OF BLOOD/URIC ACID: CPT

## 2023-06-09 PROCEDURE — 6370000000 HC RX 637 (ALT 250 FOR IP): Performed by: OBSTETRICS & GYNECOLOGY

## 2023-06-09 PROCEDURE — 80053 COMPREHEN METABOLIC PANEL: CPT

## 2023-06-09 PROCEDURE — 99284 EMERGENCY DEPT VISIT MOD MDM: CPT

## 2023-06-09 RX ORDER — ACETAMINOPHEN 325 MG/1
650 TABLET ORAL ONCE
Status: COMPLETED | OUTPATIENT
Start: 2023-06-09 | End: 2023-06-09

## 2023-06-09 RX ORDER — METOCLOPRAMIDE 10 MG/1
10 TABLET ORAL ONCE
Status: COMPLETED | OUTPATIENT
Start: 2023-06-09 | End: 2023-06-09

## 2023-06-09 RX ORDER — DIPHENHYDRAMINE HCL 25 MG
25 CAPSULE ORAL EVERY 6 HOURS PRN
Status: DISCONTINUED | OUTPATIENT
Start: 2023-06-09 | End: 2023-06-09 | Stop reason: HOSPADM

## 2023-06-09 RX ADMIN — ACETAMINOPHEN 650 MG: 325 TABLET, FILM COATED ORAL at 11:47

## 2023-06-09 RX ADMIN — METOCLOPRAMIDE 10 MG: 10 TABLET ORAL at 11:47

## 2023-06-09 NOTE — DISCHARGE INSTRUCTIONS
High Blood Pressure in Pregnancy: Care Instructions  Overview     High blood pressure (hypertension) means that the force of blood against your artery walls is too strong. High blood pressure problems during pregnancy include:  Chronic hypertension. This is high blood pressure that starts before pregnancy. Gestational hypertension. This is high blood pressure that starts in the second or third trimester of pregnancy. Preeclampsia. This is a problem that includes high blood pressure and signs of organ injury during pregnancy. In some cases, it leads to eclampsia. Eclampsia causes seizures. High blood pressure during pregnancy can affect the amount of oxygen and nutrients your baby receives. This can affect how your baby grows. High blood pressure can also cause other serious problems for both you and your baby, such as placental abruption. To prevent problems, you and your baby will be watched very closely. You will have to check your blood pressure often during pregnancy and possibly after pregnancy. If your blood pressure rises suddenly or is very high during your pregnancy, your doctor may prescribe medicines. They can usually control blood pressure. If your blood pressure affects your or your baby's health, your doctor may need to deliver your baby early. After your baby is born, your blood pressure will probably improve. But sometimes blood pressure problems continue after birth. Follow-up care is a key part of your treatment and safety. Be sure to make and go to all appointments, and call your doctor if you are having problems. It's also a good idea to know your test results and keep a list of the medicines you take. How can you care for yourself at home? Take and write down your blood pressure at home if your doctor says to. Take your medicines exactly as prescribed. Call your doctor if you think you are having a problem with your medicine. Do not smoke.  This is one of the best things you

## 2023-06-09 NOTE — H&P
History & Physical    Name: Beti Wynn MRN: [id number]  SSN: [id number]    YOB: 1985  Age: 41 y.o. Sex: female      Subjective:     Reason for Triage visit:  20w0d and headache, elevated BP    History of Present Illness: Ms. Ruddy Grajeda is a 41 y.o.  female with an estimated gestational age of 24w0d with Estimated Date of Delivery: 10/27/23. Patient states that she was recently treated for chtn affecting her pregnancy. She was given a script for procardia and she started it last night. She woke up with a bad headache this am.  She was at work and took her BP and it was elevated. She was concerned and came to triage to get checked. .  Pregnancy has been  complicated by cHTN, hx of  section, started procardia yesterday evening. Patient denies abdominal pain  , chest pain, contractions, fever, nausea and vomiting, pelvic pressure, right upper quadrant pain  , shortness of breath, swelling, vaginal bleeding , vaginal leaking of fluid , and visual disturbances.     OB History    Para Term  AB Living   3 2 0 2 0 1   SAB IAB Ectopic Molar Multiple Live Births             2      # Outcome Date GA Lbr Virgil/2nd Weight Sex Delivery Anes PTL Lv   3 Current            2  21 33w1d  3 lb 5.3 oz (1.51 kg) F CS-LTranv Spinal N GRAZYNA      Birth Comments: Pt had Covid   1  20 16w0d  3.7 oz (0.105 kg) M Vag-Spont  N ND      Birth Comments: delivered at home      Complications: Uterine leiomyoma     Past Medical History:   Diagnosis Date    Anemia     Chlamydia     History of blood transfusion     History of chlamydia 2023, Treated     Hypertension affecting pregnancy in second trimester 2023     delivery     Rh negative state in antepartum period     Uterine fibroid      Past Surgical History:   Procedure Laterality Date     SECTION       Social History     Occupational History    Not on file   Tobacco Use    Smoking

## 2023-06-09 NOTE — PROGRESS NOTES
Pt to room [facility] for triage with chief complaint of headache and BP while at work 150/102. Reports recently started on Procardia for HTN. FHT's 145. Dr CONRAD called to assess patient.

## 2023-06-19 ENCOUNTER — HOSPITAL ENCOUNTER (INPATIENT)
Age: 38
LOS: 1 days | Discharge: HOME | End: 2023-06-20
Attending: OBSTETRICS & GYNECOLOGY | Admitting: OBSTETRICS & GYNECOLOGY
Payer: COMMERCIAL

## 2023-06-19 PROBLEM — O36.4XX0 FETAL DEMISE BEFORE 22 WEEKS WITH RETENTION OF DEAD FETUS: Status: ACTIVE | Noted: 2023-06-19

## 2023-06-19 LAB
ABO + RH BLD: NORMAL
ALBUMIN SERPL-MCNC: 2.8 G/DL (ref 3.5–5)
ALBUMIN/GLOB SERPL: 0.7 (ref 0.4–1.6)
ALP SERPL-CCNC: 71 U/L (ref 50–130)
ALT SERPL-CCNC: 16 U/L (ref 12–65)
ANION GAP SERPL CALC-SCNC: 9 MMOL/L (ref 2–11)
APTT PPP: 27.2 SEC (ref 24.5–34.2)
AST SERPL-CCNC: 14 U/L (ref 15–37)
BILIRUB SERPL-MCNC: 0.2 MG/DL (ref 0.2–1.1)
BLOOD GROUP ANTIBODIES SERPL: NORMAL
BUN SERPL-MCNC: 10 MG/DL (ref 6–23)
CALCIUM SERPL-MCNC: 8.8 MG/DL (ref 8.3–10.4)
CHLORIDE SERPL-SCNC: 107 MMOL/L (ref 101–110)
CO2 SERPL-SCNC: 23 MMOL/L (ref 21–32)
CREAT SERPL-MCNC: 0.53 MG/DL (ref 0.6–1)
D DIMER PPP FEU-MCNC: 0.3 UG/ML(FEU)
ERYTHROCYTE [DISTWIDTH] IN BLOOD BY AUTOMATED COUNT: 19.4 % (ref 11.9–14.6)
FIBRINOGEN PPP-MCNC: 389 MG/DL (ref 190–501)
GLOBULIN SER CALC-MCNC: 4.3 G/DL (ref 2.8–4.5)
GLUCOSE SERPL-MCNC: 148 MG/DL (ref 65–100)
HCT VFR BLD AUTO: 36.1 % (ref 35.8–46.3)
HGB BLD-MCNC: 11.3 G/DL (ref 11.7–15.4)
INR PPP: 0.9
MCH RBC QN AUTO: 23.7 PG (ref 26.1–32.9)
MCHC RBC AUTO-ENTMCNC: 31.3 G/DL (ref 31.4–35)
MCV RBC AUTO: 75.7 FL (ref 82–102)
NRBC # BLD: 0 K/UL (ref 0–0.2)
PLATELET # BLD AUTO: 384 K/UL (ref 150–450)
PMV BLD AUTO: 9.3 FL (ref 9.4–12.3)
POTASSIUM SERPL-SCNC: 3.4 MMOL/L (ref 3.5–5.1)
PROT SERPL-MCNC: 7.1 G/DL (ref 6.3–8.2)
PROTHROMBIN TIME: 12.2 SEC (ref 12.6–14.3)
RBC # BLD AUTO: 4.77 M/UL (ref 4.05–5.2)
SODIUM SERPL-SCNC: 139 MMOL/L (ref 133–143)
SPECIMEN EXP DATE BLD: NORMAL
WBC # BLD AUTO: 8 K/UL (ref 4.3–11.1)

## 2023-06-19 PROCEDURE — 85384 FIBRINOGEN ACTIVITY: CPT

## 2023-06-19 PROCEDURE — 86901 BLOOD TYPING SEROLOGIC RH(D): CPT

## 2023-06-19 PROCEDURE — 85610 PROTHROMBIN TIME: CPT

## 2023-06-19 PROCEDURE — 2580000003 HC RX 258: Performed by: OBSTETRICS & GYNECOLOGY

## 2023-06-19 PROCEDURE — 6370000000 HC RX 637 (ALT 250 FOR IP): Performed by: OBSTETRICS & GYNECOLOGY

## 2023-06-19 PROCEDURE — 1100000000 HC RM PRIVATE

## 2023-06-19 PROCEDURE — 85730 THROMBOPLASTIN TIME PARTIAL: CPT

## 2023-06-19 PROCEDURE — 86850 RBC ANTIBODY SCREEN: CPT

## 2023-06-19 PROCEDURE — 80053 COMPREHEN METABOLIC PANEL: CPT

## 2023-06-19 PROCEDURE — 85027 COMPLETE CBC AUTOMATED: CPT

## 2023-06-19 PROCEDURE — 86900 BLOOD TYPING SEROLOGIC ABO: CPT

## 2023-06-19 PROCEDURE — 85379 FIBRIN DEGRADATION QUANT: CPT

## 2023-06-19 RX ORDER — MISOPROSTOL 200 UG/1
600 TABLET ORAL ONCE
Status: COMPLETED | OUTPATIENT
Start: 2023-06-19 | End: 2023-06-19

## 2023-06-19 RX ORDER — MIFEPRISTONE 200 MG/1
200 TABLET ORAL ONCE
Status: COMPLETED | OUTPATIENT
Start: 2023-06-19 | End: 2023-06-19

## 2023-06-19 RX ORDER — MISOPROSTOL 200 UG/1
400 TABLET ORAL
Status: DISCONTINUED | OUTPATIENT
Start: 2023-06-20 | End: 2023-06-20

## 2023-06-19 RX ORDER — SODIUM CHLORIDE, SODIUM LACTATE, POTASSIUM CHLORIDE, CALCIUM CHLORIDE 600; 310; 30; 20 MG/100ML; MG/100ML; MG/100ML; MG/100ML
INJECTION, SOLUTION INTRAVENOUS CONTINUOUS
Status: DISCONTINUED | OUTPATIENT
Start: 2023-06-19 | End: 2023-06-20

## 2023-06-19 RX ORDER — MISOPROSTOL 200 UG/1
400 TABLET ORAL
Status: DISCONTINUED | OUTPATIENT
Start: 2023-06-20 | End: 2023-06-19

## 2023-06-19 RX ORDER — ONDANSETRON 2 MG/ML
4 INJECTION INTRAMUSCULAR; INTRAVENOUS EVERY 6 HOURS PRN
Status: DISCONTINUED | OUTPATIENT
Start: 2023-06-19 | End: 2023-06-20

## 2023-06-19 RX ORDER — SODIUM CHLORIDE 0.9 % (FLUSH) 0.9 %
5-40 SYRINGE (ML) INJECTION EVERY 12 HOURS SCHEDULED
Status: DISCONTINUED | OUTPATIENT
Start: 2023-06-19 | End: 2023-06-20

## 2023-06-19 RX ORDER — SODIUM CHLORIDE 0.9 % (FLUSH) 0.9 %
5-40 SYRINGE (ML) INJECTION PRN
Status: DISCONTINUED | OUTPATIENT
Start: 2023-06-19 | End: 2023-06-20

## 2023-06-19 RX ORDER — SODIUM CHLORIDE 9 MG/ML
25 INJECTION, SOLUTION INTRAVENOUS PRN
Status: DISCONTINUED | OUTPATIENT
Start: 2023-06-19 | End: 2023-06-20

## 2023-06-19 RX ADMIN — MIFEPRISTONE 200 MG: 200 TABLET ORAL at 10:22

## 2023-06-19 RX ADMIN — SODIUM CHLORIDE, POTASSIUM CHLORIDE, SODIUM LACTATE AND CALCIUM CHLORIDE: 600; 310; 30; 20 INJECTION, SOLUTION INTRAVENOUS at 09:45

## 2023-06-19 RX ADMIN — MISOPROSTOL 600 MCG: 200 TABLET ORAL at 22:07

## 2023-06-19 NOTE — PROGRESS NOTES
0930- Pt to room 438 for admission for fetal demise. Pt arrived with FOB and 3year old daughter. POC discussed. Consents signed. Questions answered. IV started with 18G  in left hand, labs drawn and sent. LR infusing. 26- Dr Ruiz Bustillo at bedside going over [address] and administration of mifeprex, consent signed. Medication given. Pt may have regular diet until cytotec starts. 80- RN talked to patient about what to expect with delivery and decisions that will need to be made as far as , autopsy, and other decisions as well. RN explained that patient does not have to know any of the answers at this time, and that staffing/spiritual care is here to assist with any decisions. 200- Nursing supervisor and  notified of patient admission. 162 Chaplain Jameel updated by this RN on patient status and will come to see patient and family.

## 2023-06-19 NOTE — H&P
History & Physical    Name: Kae Gauthier MRN: [id number]  SSN: [id number]    YOB: 1985  Age: 41 y.o. Sex: female      Subjective:     Estimated Date of Delivery: 10/27/23  OB History    Para Term  AB Living   3 2 0 2 0 1   SAB IAB Ectopic Molar Multiple Live Births             2      # Outcome Date GA Lbr Virgil/2nd Weight Sex Delivery Anes PTL Lv   3 Current            2  21 33w1d  3 lb 5.3 oz (1.51 kg) F CS-LTranv Spinal N GRAZYNA      Birth Comments: Pt had Covid   1  20 16w0d  3.7 oz (0.105 kg) M Vag-Spont  N ND      Birth Comments: delivered at home      Complications: Uterine leiomyoma       Ms. Carla Feng is admitted with pregnancy at 21w3d for induction of labor fetal demise. This pregnancy has been complicated by uteroplacental insufficiency. She was referred to [facility] due to this and was seen today for consultation and was found to have a fetal demise. She wished to proceed on with induction of labor.   Past Medical History:   Diagnosis Date    Anemia     Chlamydia     History of blood transfusion     History of chlamydia 2023, Treated     Hypertension affecting pregnancy in second trimester 2023     delivery     Rh negative state in antepartum period     Uterine fibroid      Past Surgical History:   Procedure Laterality Date     SECTION       Social History     Occupational History    Not on file   Tobacco Use    Smoking status: Never    Smokeless tobacco: Never   Vaping Use    Vaping Use: Never used   Substance and Sexual Activity    Alcohol use: Never    Drug use: Never    Sexual activity: Yes     Partners: Male     Birth control/protection: None     Family History   Problem Relation Age of Onset    Diabetes Mother         pre diabetes    Schizophrenia Brother     Diabetes Sister         pre diabetes    Sickle Cell Trait Sister     Sickle Cell Trait Other        No Known Allergies  Prior to Admission medications

## 2023-06-19 NOTE — PROGRESS NOTES
Pt resting in bed, family and visitors at bedside. Ice pitcher replenished. Hans any other needs at this time.

## 2023-06-19 NOTE — PROGRESS NOTES
Notified by RN that patient here for fetal demise at 25 weeks (boy). Patient is known to me from previous loss 3 years ago. Patient and 2 y.o daughter were asleep. I will attempt later. Barba Babinski, M.Div.

## 2023-06-20 VITALS
HEART RATE: 100 BPM | SYSTOLIC BLOOD PRESSURE: 140 MMHG | RESPIRATION RATE: 18 BRPM | OXYGEN SATURATION: 99 % | TEMPERATURE: 98 F | DIASTOLIC BLOOD PRESSURE: 86 MMHG

## 2023-06-20 PROCEDURE — 3E033VJ INTRODUCTION OF OTHER HORMONE INTO PERIPHERAL VEIN, PERCUTANEOUS APPROACH: ICD-10-PCS | Performed by: OBSTETRICS & GYNECOLOGY

## 2023-06-20 PROCEDURE — 3E0DXGC INTRODUCTION OF OTHER THERAPEUTIC SUBSTANCE INTO MOUTH AND PHARYNX, EXTERNAL APPROACH: ICD-10-PCS | Performed by: OBSTETRICS & GYNECOLOGY

## 2023-06-20 PROCEDURE — 6360000002 HC RX W HCPCS: Performed by: OBSTETRICS & GYNECOLOGY

## 2023-06-20 PROCEDURE — 7100000011 HC PHASE II RECOVERY - ADDTL 15 MIN

## 2023-06-20 PROCEDURE — 7220000101 HC DELIVERY VAGINAL/SINGLE

## 2023-06-20 PROCEDURE — 7100000010 HC PHASE II RECOVERY - FIRST 15 MIN

## 2023-06-20 PROCEDURE — 96372 THER/PROPH/DIAG INJ SC/IM: CPT

## 2023-06-20 PROCEDURE — 2580000003 HC RX 258: Performed by: OBSTETRICS & GYNECOLOGY

## 2023-06-20 PROCEDURE — 6370000000 HC RX 637 (ALT 250 FOR IP): Performed by: OBSTETRICS & GYNECOLOGY

## 2023-06-20 PROCEDURE — 7210000100 HC LABOR FEE PER 1 HR

## 2023-06-20 RX ORDER — MISOPROSTOL 200 UG/1
400 TABLET ORAL
Status: DISCONTINUED | OUTPATIENT
Start: 2023-06-20 | End: 2023-06-20

## 2023-06-20 RX ORDER — NIFEDIPINE 30 MG/1
30 TABLET, EXTENDED RELEASE ORAL ONCE
Status: COMPLETED | OUTPATIENT
Start: 2023-06-20 | End: 2023-06-20

## 2023-06-20 RX ORDER — SODIUM CHLORIDE 9 MG/ML
INJECTION, SOLUTION INTRAVENOUS PRN
Status: DISCONTINUED | OUTPATIENT
Start: 2023-06-20 | End: 2023-06-20 | Stop reason: HOSPADM

## 2023-06-20 RX ORDER — MORPHINE SULFATE 2 MG/ML
4 INJECTION, SOLUTION INTRAMUSCULAR; INTRAVENOUS ONCE
Status: COMPLETED | OUTPATIENT
Start: 2023-06-20 | End: 2023-06-20

## 2023-06-20 RX ORDER — NIFEDIPINE 10 MG/1
10 CAPSULE ORAL
Status: COMPLETED | OUTPATIENT
Start: 2023-06-20 | End: 2023-06-20

## 2023-06-20 RX ORDER — LANOLIN
CREAM (ML) TOPICAL PRN
Status: DISCONTINUED | OUTPATIENT
Start: 2023-06-20 | End: 2023-06-20 | Stop reason: HOSPADM

## 2023-06-20 RX ORDER — SODIUM CHLORIDE, SODIUM LACTATE, POTASSIUM CHLORIDE, CALCIUM CHLORIDE 600; 310; 30; 20 MG/100ML; MG/100ML; MG/100ML; MG/100ML
INJECTION, SOLUTION INTRAVENOUS CONTINUOUS
Status: DISCONTINUED | OUTPATIENT
Start: 2023-06-20 | End: 2023-06-20 | Stop reason: HOSPADM

## 2023-06-20 RX ORDER — SODIUM CHLORIDE 0.9 % (FLUSH) 0.9 %
5-40 SYRINGE (ML) INJECTION EVERY 12 HOURS SCHEDULED
Status: DISCONTINUED | OUTPATIENT
Start: 2023-06-20 | End: 2023-06-20 | Stop reason: HOSPADM

## 2023-06-20 RX ORDER — OXYCODONE HYDROCHLORIDE 5 MG/1
5 TABLET ORAL EVERY 4 HOURS PRN
Status: DISCONTINUED | OUTPATIENT
Start: 2023-06-20 | End: 2023-06-20 | Stop reason: HOSPADM

## 2023-06-20 RX ORDER — DOCUSATE SODIUM 100 MG/1
100 CAPSULE, LIQUID FILLED ORAL 2 TIMES DAILY
Status: DISCONTINUED | OUTPATIENT
Start: 2023-06-20 | End: 2023-06-20 | Stop reason: HOSPADM

## 2023-06-20 RX ORDER — IBUPROFEN 800 MG/1
800 TABLET ORAL EVERY 8 HOURS
Status: DISCONTINUED | OUTPATIENT
Start: 2023-06-20 | End: 2023-06-20 | Stop reason: HOSPADM

## 2023-06-20 RX ORDER — SODIUM CHLORIDE 0.9 % (FLUSH) 0.9 %
5-40 SYRINGE (ML) INJECTION PRN
Status: DISCONTINUED | OUTPATIENT
Start: 2023-06-20 | End: 2023-06-20 | Stop reason: HOSPADM

## 2023-06-20 RX ORDER — NIFEDIPINE 30 MG/1
30 TABLET, EXTENDED RELEASE ORAL DAILY
Status: DISCONTINUED | OUTPATIENT
Start: 2023-06-20 | End: 2023-06-20 | Stop reason: HOSPADM

## 2023-06-20 RX ORDER — ONDANSETRON 4 MG/1
8 TABLET, ORALLY DISINTEGRATING ORAL EVERY 8 HOURS PRN
Status: DISCONTINUED | OUTPATIENT
Start: 2023-06-20 | End: 2023-06-20 | Stop reason: HOSPADM

## 2023-06-20 RX ORDER — ACETAMINOPHEN 500 MG
1000 TABLET ORAL EVERY 8 HOURS PRN
Status: DISCONTINUED | OUTPATIENT
Start: 2023-06-20 | End: 2023-06-20 | Stop reason: HOSPADM

## 2023-06-20 RX ORDER — MORPHINE SULFATE 2 MG/ML
4 INJECTION, SOLUTION INTRAMUSCULAR; INTRAVENOUS
Status: DISCONTINUED | OUTPATIENT
Start: 2023-06-20 | End: 2023-06-20

## 2023-06-20 RX ADMIN — SODIUM CHLORIDE, PRESERVATIVE FREE 10 ML: 5 INJECTION INTRAVENOUS at 06:40

## 2023-06-20 RX ADMIN — NIFEDIPINE 30 MG: 30 TABLET, EXTENDED RELEASE ORAL at 01:14

## 2023-06-20 RX ADMIN — RHO(D) IMMUNE GLOBULIN (HUMAN) 300 MCG: 1500 SOLUTION INTRAMUSCULAR at 17:34

## 2023-06-20 RX ADMIN — MORPHINE SULFATE 4 MG: 2 INJECTION, SOLUTION INTRAMUSCULAR; INTRAVENOUS at 06:37

## 2023-06-20 RX ADMIN — MORPHINE SULFATE 4 MG: 2 INJECTION, SOLUTION INTRAMUSCULAR; INTRAVENOUS at 05:16

## 2023-06-20 RX ADMIN — MISOPROSTOL 400 MCG: 200 TABLET ORAL at 06:27

## 2023-06-20 RX ADMIN — NIFEDIPINE 10 MG: 10 CAPSULE ORAL at 10:31

## 2023-06-20 RX ADMIN — IBUPROFEN 800 MG: 800 TABLET, FILM COATED ORAL at 09:23

## 2023-06-20 RX ADMIN — MISOPROSTOL 400 MCG: 200 TABLET ORAL at 02:01

## 2023-06-20 ASSESSMENT — PAIN DESCRIPTION - LOCATION
LOCATION: ABDOMEN

## 2023-06-20 ASSESSMENT — PAIN SCALES - GENERAL
PAINLEVEL_OUTOF10: 4
PAINLEVEL_OUTOF10: 10
PAINLEVEL_OUTOF10: 8

## 2023-06-20 ASSESSMENT — PAIN DESCRIPTION - DESCRIPTORS
DESCRIPTORS: CRAMPING

## 2023-06-20 NOTE — PROGRESS NOTES
Pt called out that she was delivering. RN and Dr Imani Valadez to bedside and demise delivered in bed spontaneous encall. The sac was intact. Baby taken to warmer. Wt 0.165g/5.8oz and length of 21cm/8.25in. 0730- Recovery started. Minimal amount of bleeding noted. Fundus firm and midline at umbilicus. Pt and family grieving appropriately. FOB not in the room at this time and has not been all morning. Ming Landaverde,  at bedside to see patient. 0845- Pt up to the bathroom with minimal assistance. Voided 300cc clear yellow urine. Mariposa care taught. no

## 2023-06-20 NOTE — L&D DELIVERY NOTE
Patient has been addie overnight. Family called out the baby is delivered. Presented to room and baby had delivered encall. Careful inspection showed the entire placenta was delivered. The sac was intact. Baby was placed in the warmer and membranes ruptured and showed again inspection should complete placental disc was intact. No gross abnormalities of the fetus were noted. Inspection of the perineum showed no lacerations. Bleeding is appropriate. Patient just received approximately hour ago dose of Cytotec and therefore we will watch her bleeding. Pain is controlled with the previously given morphine. Discussed the fact of the delivery with the patient and her family all questions were answered.

## 2023-06-20 NOTE — PROGRESS NOTES
Patient known to me from previous fetal loss a couple of years ago. I talked at length with patient regarding this 2nd loss. Patient verbalized desire for counseling/support group. Information given. Patient decided on [facility] since her other baby boy is buried there. Assisted patient in completing form and form left with RN and nursing supervisor notified of patient's decision. I will continue to support as needed. Rebeca Santacruz M.Div.

## 2023-06-20 NOTE — PROGRESS NOTES
Patient's family continues to stay in the room with 3 small children as well. The room is loud and overstimulating. RN asked everyone to step out to talk to the patient alone and try to just give her a few minutes of quiet. RN talked to patient about her BP being borderline high of having to get IV medication. RN also explained to patient that the noise and overstimulation can increase her BP as well. Pt states that she doesn't want to be mean and kick them out, RN reiterated that she needs some rest and downtime to heal and process everything.

## 2023-06-20 NOTE — DISCHARGE INSTRUCTIONS
Depression After Childbirth: Care Instructions  It's common to lose sleep, feel irritable, and cry easily during the first few days after childbirth. Hormone changes and the demands of a new baby can cause these \"baby blues. \" If these mood changes last more than 2 weeks, you may have postpartum depression. This is a medical condition that requires treatment. If you have any of these signs, you may be depressed. See your doctor right away. You feel very sad or hopeless and lose interest in daily activities. You sleep too much or not enough. You feel tired or as if you have no energy. You eat too much or too little. You write or talk about death. Where to get help 24 hours a day, 7 days a week If you or someone you know talks about suicide, self-harm, a mental health crisis, a substance use crisis, or any other kind of emotional distress, get help right away. You can:  Consider saving these numbers in your phone. Call the Suicide and [address] [facility] at 65. Call 4-335-224-TALK (4-895.892.5065). Text HOME to 620101 to access the Crisis Text Line. What you can do    Try to go to all of your counseling sessions. Take medicines as directed. Eat healthy foods. Get daily exercise, such as walks. Try to get some sunlight every day. Avoid using alcohol or other substances. Get as much rest as possible. Connect with friends, and join a support group for new parents. When should you call for help? Call 801 if: You feel you cannot stop from hurting yourself, your baby, or someone else. Call your doctor now or seek immediate medical care if:    You are having trouble caring for yourself or your baby. You hear voices. Contact your doctor if:    You have problems with your medicines. You do not get better as expected. Follow-up care is a key part of your treatment and safety.  Be sure to make and go to all appointments, and call your doctor if you are having

## 2023-06-20 NOTE — PROGRESS NOTES
Pt has had two severe blood pressures. Dr Nely Linton made aware. New orders received to give procardia 10mg po now and reevaluate.

## 2023-06-20 NOTE — PLAN OF CARE
Problem: Pain  Goal: Verbalizes/displays adequate comfort level or baseline comfort level  Outcome: Adequate for Discharge     Problem: Infection - Adult  Goal: Absence of infection at discharge  Outcome: Adequate for Discharge  Goal: Absence of infection during hospitalization  Outcome: Adequate for Discharge  Goal: Absence of fever/infection during anticipated neutropenic period  Outcome: Adequate for Discharge     Problem: Safety - Adult  Goal: Free from fall injury  Outcome: Adequate for Discharge     Problem: Discharge Planning  Goal: Discharge to home or other facility with appropriate resources  Outcome: Adequate for Discharge     Problem: Chronic Conditions and Co-morbidities  Goal: Patient's chronic conditions and co-morbidity symptoms are monitored and maintained or improved  Outcome: Adequate for Discharge

## 2023-06-20 NOTE — PROGRESS NOTES
[facility]                  Patient: Harley Moulton    Spouse/  FOB Wendy De La O   Date of Visit: 06/20/23       To Whom It May Concern:  Please excuse Wendy De La O from work 06/19/2023-06/21/2023 as he was in the hospital with [facility]. [facility] was a patient at [facility] emergency department from 06/19/2023 to 06/21/2023 due to medical issues. Benedict Tee may return to work on 06/22/23. If you have any questions or concerns, please don't hesitate to call.     Sincerely,    Patricia Sigala RN

## 2023-06-20 NOTE — PROGRESS NOTES
Discussed with patient that BP's are a little better and discharge home is an option.  She has decided to stay until am.

## 2023-06-20 NOTE — PROGRESS NOTES
Rhogam given IM in left glute for Rh neg status, pt tolerated well. IV removed with cath tip intact. DC instructions explained in detail with patient and family. Questions encouraged and answered. Postpartum depression symptoms addressed with patient and she understands what to look for as well as her Nathalie Solon. Patient information sent to Eve Jerry,  for follow up. Pt seems to be pretty stoic throughout the whole delivery process and postpartum period. Pt to follow up in 2 weeks with [facility] and has her appointment already. Pt given memory box. Patient is waiting on heavy rain to let up before being discharged, will let RN know.

## 2023-06-27 ENCOUNTER — FOLLOWUP TELEPHONE ENCOUNTER (OUTPATIENT)
Dept: CASE MANAGEMENT | Age: 38
End: 2023-06-27

## 2023-07-10 NOTE — PROGRESS NOTES
Lora Peguero presents for a 2 week postpartum visit. She had a fetal demises at 21w4d. Delivered by Dr. Layton Albarran on 23 by  without lacerations. Last pap: 2021 Negtaive. HPV negative. History of GDM no    Patient Active Problem List   Diagnosis    H/O  section    High risk pregnancy, multigravida of advanced maternal age in second trimester    Uterine fibroids affecting pregnancy    History of  delivery    Rh negative status during pregnancy    Anemia during pregnancy in second trimester    Hypertension affecting pregnancy in second trimester    Maternal care for poor fetal growth in second trimester    Fetal demise before 22 weeks with retention of dead fetus     LMP 2023   Affect appropriate and bright  Abdomen soft and nontender. No masses noted. Normal externalia genitalia. Uterus and adnexae nontender and normal size. Encounter Diagnosis   Name Primary? Postpartum care and examination Yes       No orders of the defined types were placed in this encounter.       Yahaira Whyte MA

## 2023-07-11 ENCOUNTER — POSTPARTUM VISIT (OUTPATIENT)
Dept: OBGYN CLINIC | Age: 38
End: 2023-07-11

## 2023-07-11 VITALS — SYSTOLIC BLOOD PRESSURE: 152 MMHG | WEIGHT: 153 LBS | BODY MASS INDEX: 28.91 KG/M2 | DIASTOLIC BLOOD PRESSURE: 98 MMHG

## 2023-07-11 PROCEDURE — 99024 POSTOP FOLLOW-UP VISIT: CPT | Performed by: OBSTETRICS & GYNECOLOGY

## 2023-07-11 PROCEDURE — G8427 DOCREV CUR MEDS BY ELIG CLIN: HCPCS | Performed by: OBSTETRICS & GYNECOLOGY

## 2023-07-11 PROCEDURE — 1036F TOBACCO NON-USER: CPT | Performed by: OBSTETRICS & GYNECOLOGY

## 2023-07-11 PROCEDURE — 1111F DSCHRG MED/CURRENT MED MERGE: CPT | Performed by: OBSTETRICS & GYNECOLOGY

## 2023-07-11 PROCEDURE — G8419 CALC BMI OUT NRM PARAM NOF/U: HCPCS | Performed by: OBSTETRICS & GYNECOLOGY

## 2023-07-11 RX ORDER — NORETHINDRONE ACETATE AND ETHINYL ESTRADIOL AND FERROUS FUMARATE 1MG-20(24)
1 KIT ORAL DAILY
Qty: 84 TABLET | Refills: 3 | Status: SHIPPED | OUTPATIENT
Start: 2023-07-11

## 2023-07-14 ENCOUNTER — FOLLOWUP TELEPHONE ENCOUNTER (OUTPATIENT)
Dept: CASE MANAGEMENT | Age: 38
End: 2023-07-14

## 2023-07-14 NOTE — TELEPHONE ENCOUNTER
Phone call to patient at [phone]. Conversation was very brief, and patient denied any needs at this time. SW encouraged patient to reach out if any needs/concerns arise.      GRAY Scott, [address] [place] [address]   [phone]

## 2024-01-25 NOTE — PROGRESS NOTES
The patient is a 39 y.o.  who is seen for Follow Up. Patient requests to discuss Fibroids.       US Findings Today: 2024    Enlarged fibroid uterus.  I have reviewed the ultrasound findings and in conjunction with the ultrasonographer have generated comments included with the report.  These finding and implications have been reviewed with the patient at the time of the exam.        Desires hysterectomy, ASC/BSX arranged.   Patient counseled regarding risks of surgery including but not limited to hemorrhage during or after surgery which may result in further surgeries, blood transfusions or death; infection possibly requiring readmission to the hospital and or prolonged antibiotics use or further surgeries to correct; injury to adjacent abdominal structures, bladder or kidney tubes which may require another readmission and or more extensive surgeries to correct as well as remote possibility of surgery not correcting her problem.  She voices understanding these and other risks as described to her and a willingness to proceed.

## 2024-01-26 ENCOUNTER — PROCEDURE VISIT (OUTPATIENT)
Dept: OBGYN CLINIC | Age: 39
End: 2024-01-26
Payer: COMMERCIAL

## 2024-01-26 ENCOUNTER — OFFICE VISIT (OUTPATIENT)
Dept: OBGYN CLINIC | Age: 39
End: 2024-01-26
Payer: COMMERCIAL

## 2024-01-26 VITALS
DIASTOLIC BLOOD PRESSURE: 77 MMHG | WEIGHT: 148.5 LBS | SYSTOLIC BLOOD PRESSURE: 120 MMHG | HEIGHT: 61 IN | BODY MASS INDEX: 28.04 KG/M2

## 2024-01-26 DIAGNOSIS — D21.9 FIBROIDS: Primary | ICD-10-CM

## 2024-01-26 PROCEDURE — 76830 TRANSVAGINAL US NON-OB: CPT | Performed by: OBSTETRICS & GYNECOLOGY

## 2024-01-26 PROCEDURE — 99214 OFFICE O/P EST MOD 30 MIN: CPT | Performed by: OBSTETRICS & GYNECOLOGY

## 2024-04-02 ENCOUNTER — PREP FOR PROCEDURE (OUTPATIENT)
Dept: OBGYN CLINIC | Age: 39
End: 2024-04-02

## 2024-04-02 DIAGNOSIS — D21.9 FIBROIDS: ICD-10-CM

## (undated) DEVICE — SURGICAL PROCEDURE PACK C SECT CDS

## (undated) DEVICE — SUT CHRMC 0 27IN CT1 BRN --

## (undated) DEVICE — SUTURE VCRL SZ 0 L36IN ABSRB UD L48MM CTX 1/2 CIR J978H

## (undated) DEVICE — REM POLYHESIVE ADULT PATIENT RETURN ELECTRODE: Brand: VALLEYLAB

## (undated) DEVICE — SOLUTION IRRIG 1000ML H2O STRL BLT

## (undated) DEVICE — SUTURE MCRYL SZ 3-0 L27IN ABSRB UD L60MM KS STR REV CUT Y523H

## (undated) DEVICE — (D)PREP SKN CHLRAPRP APPL 26ML -- CONVERT TO ITEM 371833

## (undated) DEVICE — SOLUTION IV 1000ML 0.9% SOD CHL

## (undated) DEVICE — AMD ANTIMICROBIAL GAUZE SPONGES,12 PLY USP TYPE VII, 0.2% POLYHEXAMETHYLENE BIGUANIDE HCI (PHMB): Brand: CURITY

## (undated) DEVICE — PENCIL ES L3M BTTN SWCH S STL HEX LOK BLDE ELECTRD HOLSTER

## (undated) DEVICE — SUTURE PLN GUT SZ 2-0 L27IN ABSRB YELLOWISH TAN L40MM CT 853H

## (undated) DEVICE — SUTURE VCRL SZ 3-0 L36IN ABSRB UD L36MM CT-1 1/2 CIR J944H

## (undated) DEVICE — AMD ANTIMICROBIAL NON-ADHERENT PAD,0.2% POLYHEXAMETHYLENE BIGUANIDE HCI (PHMB): Brand: TELFA

## (undated) DEVICE — MEDI-VAC NON-CONDUCTIVE SUCTION TUBING: Brand: CARDINAL HEALTH

## (undated) DEVICE — KENDALL SCD EXPRESS SLEEVES, KNEE LENGTH, MEDIUM: Brand: KENDALL SCD

## (undated) DEVICE — Device: Brand: PORTEX

## (undated) DEVICE — SUT CHRMC 3-0 27IN SH BRN --

## (undated) DEVICE — CATH FOL TY IC BAG 16FR 2000ML -- CONVERT TO ITEM 363158